# Patient Record
Sex: MALE | Race: WHITE | Employment: OTHER | URBAN - NONMETROPOLITAN AREA
[De-identification: names, ages, dates, MRNs, and addresses within clinical notes are randomized per-mention and may not be internally consistent; named-entity substitution may affect disease eponyms.]

---

## 2017-01-03 ENCOUNTER — OFFICE VISIT (OUTPATIENT)
Dept: CARDIOLOGY | Age: 72
End: 2017-01-03

## 2017-01-03 ENCOUNTER — TELEPHONE (OUTPATIENT)
Dept: INTERNAL MEDICINE | Age: 72
End: 2017-01-03

## 2017-01-03 VITALS
BODY MASS INDEX: 23.63 KG/M2 | DIASTOLIC BLOOD PRESSURE: 70 MMHG | RESPIRATION RATE: 18 BRPM | HEIGHT: 66 IN | HEART RATE: 57 BPM | SYSTOLIC BLOOD PRESSURE: 130 MMHG | WEIGHT: 147 LBS

## 2017-01-03 DIAGNOSIS — I10 ESSENTIAL HYPERTENSION: Primary | ICD-10-CM

## 2017-01-03 DIAGNOSIS — I21.4 NSTEMI (NON-ST ELEVATED MYOCARDIAL INFARCTION) (HCC): ICD-10-CM

## 2017-01-03 DIAGNOSIS — I25.10 CORONARY ARTERY DISEASE DUE TO LIPID RICH PLAQUE: ICD-10-CM

## 2017-01-03 DIAGNOSIS — E78.5 HYPERLIPIDEMIA, UNSPECIFIED HYPERLIPIDEMIA TYPE: ICD-10-CM

## 2017-01-03 DIAGNOSIS — E11.9 TYPE 2 DIABETES MELLITUS WITHOUT COMPLICATION, WITHOUT LONG-TERM CURRENT USE OF INSULIN (HCC): ICD-10-CM

## 2017-01-03 DIAGNOSIS — I25.83 CORONARY ARTERY DISEASE DUE TO LIPID RICH PLAQUE: ICD-10-CM

## 2017-01-03 PROCEDURE — 93000 ELECTROCARDIOGRAM COMPLETE: CPT | Performed by: INTERNAL MEDICINE

## 2017-01-03 PROCEDURE — 99213 OFFICE O/P EST LOW 20 MIN: CPT | Performed by: INTERNAL MEDICINE

## 2017-01-16 ENCOUNTER — TELEPHONE (OUTPATIENT)
Dept: SURGERY | Age: 72
End: 2017-01-16

## 2017-01-16 DIAGNOSIS — Z86.010 HISTORY OF COLONIC POLYPS: ICD-10-CM

## 2017-01-16 DIAGNOSIS — Z12.11 COLON CANCER SCREENING: Primary | ICD-10-CM

## 2017-01-16 RX ORDER — POLYETHYLENE GLYCOL 3350, SODIUM CHLORIDE, SODIUM BICARBONATE, POTASSIUM CHLORIDE 420; 11.2; 5.72; 1.48 G/4L; G/4L; G/4L; G/4L
4000 POWDER, FOR SOLUTION ORAL ONCE
Qty: 4000 ML | Refills: 0 | Status: SHIPPED | OUTPATIENT
Start: 2017-01-16 | End: 2017-01-16

## 2017-02-21 DIAGNOSIS — I10 ESSENTIAL HYPERTENSION: ICD-10-CM

## 2017-02-21 RX ORDER — LISINOPRIL 20 MG/1
20 TABLET ORAL DAILY
Qty: 90 TABLET | Refills: 3 | Status: SHIPPED | OUTPATIENT
Start: 2017-02-21 | End: 2018-02-13 | Stop reason: HOSPADM

## 2017-02-21 RX ORDER — LISINOPRIL 10 MG/1
10 TABLET ORAL DAILY
Qty: 30 TABLET | Refills: 3 | OUTPATIENT
Start: 2017-02-21

## 2017-02-21 RX ORDER — LISINOPRIL 20 MG/1
20 TABLET ORAL DAILY
COMMUNITY
End: 2017-02-21 | Stop reason: SDUPTHER

## 2017-02-27 ENCOUNTER — HOSPITAL ENCOUNTER (OUTPATIENT)
Age: 72
Setting detail: OUTPATIENT SURGERY
Discharge: HOME | End: 2017-02-27
Attending: SURGERY | Admitting: SURGERY
Payer: MEDICARE

## 2017-02-27 ENCOUNTER — SURGERY (OUTPATIENT)
Age: 72
End: 2017-02-27

## 2017-02-27 ENCOUNTER — ANESTHESIA EVENT (OUTPATIENT)
Dept: OPERATING ROOM | Age: 72
End: 2017-02-27
Payer: MEDICARE

## 2017-02-27 ENCOUNTER — ANESTHESIA (OUTPATIENT)
Dept: OPERATING ROOM | Age: 72
End: 2017-02-27
Payer: MEDICARE

## 2017-02-27 VITALS — OXYGEN SATURATION: 98 % | DIASTOLIC BLOOD PRESSURE: 66 MMHG | SYSTOLIC BLOOD PRESSURE: 130 MMHG

## 2017-02-27 VITALS
DIASTOLIC BLOOD PRESSURE: 62 MMHG | HEART RATE: 55 BPM | BODY MASS INDEX: 23.3 KG/M2 | SYSTOLIC BLOOD PRESSURE: 103 MMHG | RESPIRATION RATE: 16 BRPM | WEIGHT: 145 LBS | HEIGHT: 66 IN | OXYGEN SATURATION: 98 % | TEMPERATURE: 97.8 F

## 2017-02-27 PROCEDURE — 7100000011 HC PHASE II RECOVERY - ADDTL 15 MIN: Performed by: SURGERY

## 2017-02-27 PROCEDURE — 6360000002 HC RX W HCPCS: Performed by: NURSE ANESTHETIST, CERTIFIED REGISTERED

## 2017-02-27 PROCEDURE — 45378 DIAGNOSTIC COLONOSCOPY: CPT | Performed by: SURGERY

## 2017-02-27 PROCEDURE — 2580000003 HC RX 258: Performed by: SURGERY

## 2017-02-27 PROCEDURE — 3609027000 HC COLONOSCOPY: Performed by: SURGERY

## 2017-02-27 PROCEDURE — 7100000010 HC PHASE II RECOVERY - FIRST 15 MIN: Performed by: SURGERY

## 2017-02-27 RX ORDER — SODIUM CHLORIDE, SODIUM LACTATE, POTASSIUM CHLORIDE, CALCIUM CHLORIDE 600; 310; 30; 20 MG/100ML; MG/100ML; MG/100ML; MG/100ML
INJECTION, SOLUTION INTRAVENOUS CONTINUOUS
Status: DISCONTINUED | OUTPATIENT
Start: 2017-02-27 | End: 2017-02-27 | Stop reason: HOSPADM

## 2017-02-27 RX ORDER — PROPOFOL 10 MG/ML
INJECTION, EMULSION INTRAVENOUS PRN
Status: DISCONTINUED | OUTPATIENT
Start: 2017-02-27 | End: 2017-02-27 | Stop reason: SDUPTHER

## 2017-02-27 RX ADMIN — SODIUM CHLORIDE, POTASSIUM CHLORIDE, SODIUM LACTATE AND CALCIUM CHLORIDE: 600; 310; 30; 20 INJECTION, SOLUTION INTRAVENOUS at 10:07

## 2017-02-27 RX ADMIN — PROPOFOL 200 MG: 10 INJECTION, EMULSION INTRAVENOUS at 10:55

## 2017-02-27 ASSESSMENT — PAIN DESCRIPTION - DESCRIPTORS: DESCRIPTORS: CONSTANT

## 2017-02-27 ASSESSMENT — PAIN SCALES - GENERAL
PAINLEVEL_OUTOF10: 0

## 2017-02-27 ASSESSMENT — PAIN - FUNCTIONAL ASSESSMENT: PAIN_FUNCTIONAL_ASSESSMENT: 0-10

## 2017-03-13 ENCOUNTER — OFFICE VISIT (OUTPATIENT)
Dept: PRIMARY CARE CLINIC | Age: 72
End: 2017-03-13
Payer: MEDICARE

## 2017-03-13 VITALS
SYSTOLIC BLOOD PRESSURE: 122 MMHG | OXYGEN SATURATION: 98 % | DIASTOLIC BLOOD PRESSURE: 74 MMHG | WEIGHT: 150 LBS | BODY MASS INDEX: 24.11 KG/M2 | TEMPERATURE: 97.8 F | HEART RATE: 59 BPM | HEIGHT: 66 IN

## 2017-03-13 DIAGNOSIS — J01.40 ACUTE PANSINUSITIS, RECURRENCE NOT SPECIFIED: Primary | ICD-10-CM

## 2017-03-13 PROCEDURE — 1123F ACP DISCUSS/DSCN MKR DOCD: CPT | Performed by: PHYSICIAN ASSISTANT

## 2017-03-13 PROCEDURE — 4040F PNEUMOC VAC/ADMIN/RCVD: CPT | Performed by: PHYSICIAN ASSISTANT

## 2017-03-13 PROCEDURE — G8484 FLU IMMUNIZE NO ADMIN: HCPCS | Performed by: PHYSICIAN ASSISTANT

## 2017-03-13 PROCEDURE — G8598 ASA/ANTIPLAT THER USED: HCPCS | Performed by: PHYSICIAN ASSISTANT

## 2017-03-13 PROCEDURE — 99213 OFFICE O/P EST LOW 20 MIN: CPT | Performed by: PHYSICIAN ASSISTANT

## 2017-03-13 PROCEDURE — G8427 DOCREV CUR MEDS BY ELIG CLIN: HCPCS | Performed by: PHYSICIAN ASSISTANT

## 2017-03-13 PROCEDURE — 1036F TOBACCO NON-USER: CPT | Performed by: PHYSICIAN ASSISTANT

## 2017-03-13 PROCEDURE — G8420 CALC BMI NORM PARAMETERS: HCPCS | Performed by: PHYSICIAN ASSISTANT

## 2017-03-13 PROCEDURE — 3017F COLORECTAL CA SCREEN DOC REV: CPT | Performed by: PHYSICIAN ASSISTANT

## 2017-03-13 RX ORDER — CEFUROXIME AXETIL 250 MG/1
250 TABLET ORAL 2 TIMES DAILY
Qty: 20 TABLET | Refills: 0 | Status: SHIPPED | OUTPATIENT
Start: 2017-03-13 | End: 2017-03-23

## 2017-03-13 RX ORDER — PREDNISONE 20 MG/1
20 TABLET ORAL 2 TIMES DAILY
Qty: 10 TABLET | Refills: 0 | Status: SHIPPED | OUTPATIENT
Start: 2017-03-13 | End: 2017-03-18

## 2017-03-13 ASSESSMENT — ENCOUNTER SYMPTOMS
COUGH: 1
SORE THROAT: 1
WHEEZING: 0
RHINORRHEA: 1

## 2017-03-20 ENCOUNTER — TELEPHONE (OUTPATIENT)
Dept: SURGERY | Age: 72
End: 2017-03-20

## 2017-04-24 DIAGNOSIS — I10 ESSENTIAL HYPERTENSION: ICD-10-CM

## 2017-04-24 RX ORDER — METOPROLOL TARTRATE 50 MG/1
TABLET, FILM COATED ORAL
Qty: 180 TABLET | Refills: 3 | Status: SHIPPED | OUTPATIENT
Start: 2017-04-24 | End: 2018-03-19 | Stop reason: SDUPTHER

## 2017-05-05 ENCOUNTER — OFFICE VISIT (OUTPATIENT)
Dept: INTERNAL MEDICINE | Age: 72
End: 2017-05-05
Payer: MEDICARE

## 2017-05-05 VITALS
HEIGHT: 66 IN | WEIGHT: 146 LBS | HEART RATE: 56 BPM | RESPIRATION RATE: 16 BRPM | DIASTOLIC BLOOD PRESSURE: 64 MMHG | BODY MASS INDEX: 23.46 KG/M2 | SYSTOLIC BLOOD PRESSURE: 110 MMHG

## 2017-05-05 DIAGNOSIS — C61 PROSTATE CANCER (HCC): ICD-10-CM

## 2017-05-05 DIAGNOSIS — M54.41 CHRONIC MIDLINE LOW BACK PAIN WITH BILATERAL SCIATICA: ICD-10-CM

## 2017-05-05 DIAGNOSIS — E11.9 TYPE 2 DIABETES MELLITUS WITHOUT COMPLICATION, WITHOUT LONG-TERM CURRENT USE OF INSULIN (HCC): Primary | ICD-10-CM

## 2017-05-05 DIAGNOSIS — G89.29 CHRONIC MIDLINE LOW BACK PAIN WITH BILATERAL SCIATICA: ICD-10-CM

## 2017-05-05 DIAGNOSIS — M54.42 CHRONIC MIDLINE LOW BACK PAIN WITH BILATERAL SCIATICA: ICD-10-CM

## 2017-05-05 DIAGNOSIS — Z23 NEED FOR PNEUMOCOCCAL VACCINATION: ICD-10-CM

## 2017-05-05 DIAGNOSIS — I10 ESSENTIAL HYPERTENSION: ICD-10-CM

## 2017-05-05 DIAGNOSIS — E78.5 HYPERLIPIDEMIA, UNSPECIFIED HYPERLIPIDEMIA TYPE: ICD-10-CM

## 2017-05-05 PROCEDURE — 3044F HG A1C LEVEL LT 7.0%: CPT | Performed by: INTERNAL MEDICINE

## 2017-05-05 PROCEDURE — 1123F ACP DISCUSS/DSCN MKR DOCD: CPT | Performed by: INTERNAL MEDICINE

## 2017-05-05 PROCEDURE — G0009 ADMIN PNEUMOCOCCAL VACCINE: HCPCS | Performed by: INTERNAL MEDICINE

## 2017-05-05 PROCEDURE — 1036F TOBACCO NON-USER: CPT | Performed by: INTERNAL MEDICINE

## 2017-05-05 PROCEDURE — G8598 ASA/ANTIPLAT THER USED: HCPCS | Performed by: INTERNAL MEDICINE

## 2017-05-05 PROCEDURE — 90670 PCV13 VACCINE IM: CPT | Performed by: INTERNAL MEDICINE

## 2017-05-05 PROCEDURE — G8427 DOCREV CUR MEDS BY ELIG CLIN: HCPCS | Performed by: INTERNAL MEDICINE

## 2017-05-05 PROCEDURE — 3017F COLORECTAL CA SCREEN DOC REV: CPT | Performed by: INTERNAL MEDICINE

## 2017-05-05 PROCEDURE — G8420 CALC BMI NORM PARAMETERS: HCPCS | Performed by: INTERNAL MEDICINE

## 2017-05-05 PROCEDURE — 99214 OFFICE O/P EST MOD 30 MIN: CPT | Performed by: INTERNAL MEDICINE

## 2017-05-05 PROCEDURE — 4040F PNEUMOC VAC/ADMIN/RCVD: CPT | Performed by: INTERNAL MEDICINE

## 2017-05-05 ASSESSMENT — ENCOUNTER SYMPTOMS
BLOOD IN STOOL: 0
EYE PAIN: 0
DIARRHEA: 0
SHORTNESS OF BREATH: 0
CONSTIPATION: 0
NAUSEA: 0
ABDOMINAL PAIN: 0
COUGH: 0
VOMITING: 0
BACK PAIN: 0

## 2017-05-05 ASSESSMENT — PATIENT HEALTH QUESTIONNAIRE - PHQ9
2. FEELING DOWN, DEPRESSED OR HOPELESS: 0
SUM OF ALL RESPONSES TO PHQ QUESTIONS 1-9: 0
1. LITTLE INTEREST OR PLEASURE IN DOING THINGS: 0
SUM OF ALL RESPONSES TO PHQ9 QUESTIONS 1 & 2: 0

## 2017-05-06 ENCOUNTER — OFFICE VISIT (OUTPATIENT)
Dept: PRIMARY CARE CLINIC | Age: 72
End: 2017-05-06
Payer: MEDICARE

## 2017-05-06 VITALS
DIASTOLIC BLOOD PRESSURE: 88 MMHG | HEART RATE: 54 BPM | HEIGHT: 66 IN | SYSTOLIC BLOOD PRESSURE: 138 MMHG | WEIGHT: 149 LBS | TEMPERATURE: 97.7 F | RESPIRATION RATE: 14 BRPM | BODY MASS INDEX: 23.95 KG/M2 | OXYGEN SATURATION: 97 %

## 2017-05-06 DIAGNOSIS — H60.502 ACUTE NONINFECTIVE OTITIS EXTERNA OF LEFT EAR, UNSPECIFIED TYPE: Primary | ICD-10-CM

## 2017-05-06 DIAGNOSIS — H61.22 IMPACTED CERUMEN OF LEFT EAR: ICD-10-CM

## 2017-05-06 PROCEDURE — 99213 OFFICE O/P EST LOW 20 MIN: CPT | Performed by: FAMILY MEDICINE

## 2017-05-06 PROCEDURE — 4040F PNEUMOC VAC/ADMIN/RCVD: CPT | Performed by: FAMILY MEDICINE

## 2017-05-06 PROCEDURE — G8427 DOCREV CUR MEDS BY ELIG CLIN: HCPCS | Performed by: FAMILY MEDICINE

## 2017-05-06 PROCEDURE — G8420 CALC BMI NORM PARAMETERS: HCPCS | Performed by: FAMILY MEDICINE

## 2017-05-06 PROCEDURE — 1036F TOBACCO NON-USER: CPT | Performed by: FAMILY MEDICINE

## 2017-05-06 PROCEDURE — 69210 REMOVE IMPACTED EAR WAX UNI: CPT | Performed by: FAMILY MEDICINE

## 2017-05-06 PROCEDURE — G8598 ASA/ANTIPLAT THER USED: HCPCS | Performed by: FAMILY MEDICINE

## 2017-05-06 PROCEDURE — 4130F TOPICAL PREP RX AOE: CPT | Performed by: FAMILY MEDICINE

## 2017-05-06 PROCEDURE — 3017F COLORECTAL CA SCREEN DOC REV: CPT | Performed by: FAMILY MEDICINE

## 2017-05-06 PROCEDURE — 1123F ACP DISCUSS/DSCN MKR DOCD: CPT | Performed by: FAMILY MEDICINE

## 2017-05-06 RX ORDER — NEOMYCIN SULFATE, POLYMYXIN B SULFATE AND HYDROCORTISONE 10; 3.5; 1 MG/ML; MG/ML; [USP'U]/ML
3 SUSPENSION/ DROPS AURICULAR (OTIC) 3 TIMES DAILY
Qty: 1 BOTTLE | Refills: 0 | Status: SHIPPED | OUTPATIENT
Start: 2017-05-06 | End: 2017-05-13

## 2017-05-06 ASSESSMENT — ENCOUNTER SYMPTOMS
VOMITING: 1
NAUSEA: 1
SORE THROAT: 0
COUGH: 0

## 2017-05-10 ENCOUNTER — EVALUATION (OUTPATIENT)
Dept: PHYSICAL THERAPY | Age: 72
End: 2017-05-10
Payer: MEDICARE

## 2017-05-10 DIAGNOSIS — M54.41 CHRONIC MIDLINE LOW BACK PAIN WITH BILATERAL SCIATICA: Primary | ICD-10-CM

## 2017-05-10 DIAGNOSIS — G89.29 CHRONIC MIDLINE LOW BACK PAIN WITH BILATERAL SCIATICA: Primary | ICD-10-CM

## 2017-05-10 DIAGNOSIS — M54.42 CHRONIC MIDLINE LOW BACK PAIN WITH BILATERAL SCIATICA: Primary | ICD-10-CM

## 2017-05-10 PROCEDURE — 97162 PT EVAL MOD COMPLEX 30 MIN: CPT | Performed by: PHYSICAL THERAPIST

## 2017-05-10 PROCEDURE — G8982 BODY POS GOAL STATUS: HCPCS | Performed by: PHYSICAL THERAPIST

## 2017-05-10 PROCEDURE — 97110 THERAPEUTIC EXERCISES: CPT | Performed by: PHYSICAL THERAPIST

## 2017-05-10 PROCEDURE — G8981 BODY POS CURRENT STATUS: HCPCS | Performed by: PHYSICAL THERAPIST

## 2017-05-10 ASSESSMENT — PAIN DESCRIPTION - PROGRESSION: CLINICAL_PROGRESSION: GRADUALLY WORSENING

## 2017-05-10 ASSESSMENT — PAIN DESCRIPTION - FREQUENCY: FREQUENCY: CONTINUOUS

## 2017-05-10 ASSESSMENT — PAIN DESCRIPTION - PAIN TYPE: TYPE: CHRONIC PAIN

## 2017-05-10 ASSESSMENT — PAIN DESCRIPTION - LOCATION: LOCATION: BACK

## 2017-05-10 ASSESSMENT — PAIN DESCRIPTION - ORIENTATION: ORIENTATION: MID;LOWER;LEFT;RIGHT;POSTERIOR

## 2017-05-10 ASSESSMENT — PAIN SCALES - GENERAL: PAINLEVEL_OUTOF10: 5

## 2017-05-10 ASSESSMENT — PAIN DESCRIPTION - ONSET: ONSET: ON-GOING

## 2017-05-12 ENCOUNTER — TREATMENT (OUTPATIENT)
Dept: PHYSICAL THERAPY | Age: 72
End: 2017-05-12
Payer: MEDICARE

## 2017-05-12 DIAGNOSIS — M54.41 CHRONIC MIDLINE LOW BACK PAIN WITH BILATERAL SCIATICA: Primary | ICD-10-CM

## 2017-05-12 DIAGNOSIS — M54.42 CHRONIC MIDLINE LOW BACK PAIN WITH BILATERAL SCIATICA: Primary | ICD-10-CM

## 2017-05-12 DIAGNOSIS — G89.29 CHRONIC MIDLINE LOW BACK PAIN WITH BILATERAL SCIATICA: Primary | ICD-10-CM

## 2017-05-12 PROCEDURE — G0283 ELEC STIM OTHER THAN WOUND: HCPCS | Performed by: PHYSICAL THERAPIST

## 2017-05-12 PROCEDURE — 97110 THERAPEUTIC EXERCISES: CPT | Performed by: PHYSICAL THERAPIST

## 2017-05-16 ENCOUNTER — TREATMENT (OUTPATIENT)
Dept: PHYSICAL THERAPY | Age: 72
End: 2017-05-16
Payer: MEDICARE

## 2017-05-16 DIAGNOSIS — M54.41 CHRONIC MIDLINE LOW BACK PAIN WITH BILATERAL SCIATICA: Primary | ICD-10-CM

## 2017-05-16 DIAGNOSIS — G89.29 CHRONIC MIDLINE LOW BACK PAIN WITH BILATERAL SCIATICA: Primary | ICD-10-CM

## 2017-05-16 DIAGNOSIS — M54.42 CHRONIC MIDLINE LOW BACK PAIN WITH BILATERAL SCIATICA: Primary | ICD-10-CM

## 2017-05-16 PROCEDURE — G0283 ELEC STIM OTHER THAN WOUND: HCPCS | Performed by: PHYSICAL THERAPIST

## 2017-05-16 PROCEDURE — 97110 THERAPEUTIC EXERCISES: CPT | Performed by: PHYSICAL THERAPIST

## 2017-05-22 ENCOUNTER — TREATMENT (OUTPATIENT)
Dept: PHYSICAL THERAPY | Age: 72
End: 2017-05-22

## 2017-06-01 ENCOUNTER — TREATMENT (OUTPATIENT)
Dept: PHYSICAL THERAPY | Age: 72
End: 2017-06-01

## 2017-06-26 RX ORDER — PANTOPRAZOLE SODIUM 40 MG/1
40 TABLET, DELAYED RELEASE ORAL DAILY
Qty: 30 TABLET | Refills: 11 | Status: SHIPPED | OUTPATIENT
Start: 2017-06-26 | End: 2018-06-19 | Stop reason: SDUPTHER

## 2017-07-25 RX ORDER — NITROGLYCERIN 0.4 MG/1
0.4 TABLET SUBLINGUAL EVERY 5 MIN PRN
Qty: 25 TABLET | Refills: 3 | Status: SHIPPED | OUTPATIENT
Start: 2017-07-25 | End: 2020-05-15 | Stop reason: SDUPTHER

## 2017-07-25 RX ORDER — CLOPIDOGREL BISULFATE 75 MG/1
75 TABLET ORAL DAILY
Qty: 30 TABLET | Refills: 6 | Status: SHIPPED | OUTPATIENT
Start: 2017-07-25 | End: 2018-04-03 | Stop reason: SDUPTHER

## 2017-08-08 ENCOUNTER — OFFICE VISIT (OUTPATIENT)
Dept: PRIMARY CARE CLINIC | Age: 72
End: 2017-08-08
Payer: MEDICARE

## 2017-08-08 VITALS
HEART RATE: 62 BPM | WEIGHT: 149 LBS | TEMPERATURE: 97.7 F | DIASTOLIC BLOOD PRESSURE: 82 MMHG | BODY MASS INDEX: 24.05 KG/M2 | SYSTOLIC BLOOD PRESSURE: 134 MMHG | OXYGEN SATURATION: 97 %

## 2017-08-08 DIAGNOSIS — M79.661 RIGHT CALF PAIN: ICD-10-CM

## 2017-08-08 DIAGNOSIS — M79.661 PAIN IN RIGHT LOWER LEG: ICD-10-CM

## 2017-08-08 DIAGNOSIS — M71.21 BAKER'S CYST OF KNEE, RIGHT: Primary | ICD-10-CM

## 2017-08-08 PROCEDURE — G8420 CALC BMI NORM PARAMETERS: HCPCS | Performed by: FAMILY MEDICINE

## 2017-08-08 PROCEDURE — 1123F ACP DISCUSS/DSCN MKR DOCD: CPT | Performed by: FAMILY MEDICINE

## 2017-08-08 PROCEDURE — 3017F COLORECTAL CA SCREEN DOC REV: CPT | Performed by: FAMILY MEDICINE

## 2017-08-08 PROCEDURE — G8598 ASA/ANTIPLAT THER USED: HCPCS | Performed by: FAMILY MEDICINE

## 2017-08-08 PROCEDURE — 4040F PNEUMOC VAC/ADMIN/RCVD: CPT | Performed by: FAMILY MEDICINE

## 2017-08-08 PROCEDURE — 1036F TOBACCO NON-USER: CPT | Performed by: FAMILY MEDICINE

## 2017-08-08 PROCEDURE — G8427 DOCREV CUR MEDS BY ELIG CLIN: HCPCS | Performed by: FAMILY MEDICINE

## 2017-08-08 PROCEDURE — 99214 OFFICE O/P EST MOD 30 MIN: CPT | Performed by: FAMILY MEDICINE

## 2017-08-08 RX ORDER — PREDNISONE 20 MG/1
20 TABLET ORAL 2 TIMES DAILY
Qty: 10 TABLET | Refills: 0 | Status: SHIPPED | OUTPATIENT
Start: 2017-08-08 | End: 2017-08-13

## 2017-08-08 ASSESSMENT — ENCOUNTER SYMPTOMS: COLOR CHANGE: 1

## 2017-09-12 ENCOUNTER — OFFICE VISIT (OUTPATIENT)
Dept: CARDIOLOGY | Age: 72
End: 2017-09-12
Payer: MEDICARE

## 2017-09-12 VITALS
WEIGHT: 146 LBS | DIASTOLIC BLOOD PRESSURE: 70 MMHG | HEIGHT: 66 IN | BODY MASS INDEX: 23.46 KG/M2 | SYSTOLIC BLOOD PRESSURE: 160 MMHG | HEART RATE: 58 BPM

## 2017-09-12 DIAGNOSIS — I10 ESSENTIAL HYPERTENSION: Primary | ICD-10-CM

## 2017-09-12 DIAGNOSIS — I21.4 NSTEMI (NON-ST ELEVATED MYOCARDIAL INFARCTION) (HCC): ICD-10-CM

## 2017-09-12 DIAGNOSIS — E78.5 HYPERLIPIDEMIA, UNSPECIFIED HYPERLIPIDEMIA TYPE: ICD-10-CM

## 2017-09-12 DIAGNOSIS — I25.10 CORONARY ARTERY DISEASE DUE TO LIPID RICH PLAQUE: ICD-10-CM

## 2017-09-12 DIAGNOSIS — E11.9 TYPE 2 DIABETES MELLITUS WITHOUT COMPLICATION, WITHOUT LONG-TERM CURRENT USE OF INSULIN (HCC): ICD-10-CM

## 2017-09-12 DIAGNOSIS — I25.83 CORONARY ARTERY DISEASE DUE TO LIPID RICH PLAQUE: ICD-10-CM

## 2017-09-12 PROCEDURE — 99213 OFFICE O/P EST LOW 20 MIN: CPT | Performed by: INTERNAL MEDICINE

## 2017-09-12 PROCEDURE — 1036F TOBACCO NON-USER: CPT | Performed by: INTERNAL MEDICINE

## 2017-09-12 PROCEDURE — G8427 DOCREV CUR MEDS BY ELIG CLIN: HCPCS | Performed by: INTERNAL MEDICINE

## 2017-09-12 PROCEDURE — G8598 ASA/ANTIPLAT THER USED: HCPCS | Performed by: INTERNAL MEDICINE

## 2017-09-12 PROCEDURE — G8420 CALC BMI NORM PARAMETERS: HCPCS | Performed by: INTERNAL MEDICINE

## 2017-09-12 PROCEDURE — 1123F ACP DISCUSS/DSCN MKR DOCD: CPT | Performed by: INTERNAL MEDICINE

## 2017-09-12 PROCEDURE — 4040F PNEUMOC VAC/ADMIN/RCVD: CPT | Performed by: INTERNAL MEDICINE

## 2017-09-12 PROCEDURE — 3046F HEMOGLOBIN A1C LEVEL >9.0%: CPT | Performed by: INTERNAL MEDICINE

## 2017-09-12 PROCEDURE — 3017F COLORECTAL CA SCREEN DOC REV: CPT | Performed by: INTERNAL MEDICINE

## 2017-09-12 PROCEDURE — 93000 ELECTROCARDIOGRAM COMPLETE: CPT | Performed by: INTERNAL MEDICINE

## 2017-09-22 ENCOUNTER — TELEPHONE (OUTPATIENT)
Dept: CARDIOLOGY | Age: 72
End: 2017-09-22

## 2017-11-01 ENCOUNTER — HOSPITAL ENCOUNTER (OUTPATIENT)
Dept: LAB | Age: 72
Setting detail: SPECIMEN
Discharge: HOME | End: 2017-11-01
Payer: MEDICARE

## 2017-11-01 DIAGNOSIS — E11.9 TYPE 2 DIABETES MELLITUS WITHOUT COMPLICATION, WITHOUT LONG-TERM CURRENT USE OF INSULIN (HCC): ICD-10-CM

## 2017-11-01 DIAGNOSIS — E78.5 HYPERLIPIDEMIA, UNSPECIFIED HYPERLIPIDEMIA TYPE: ICD-10-CM

## 2017-11-01 DIAGNOSIS — I10 ESSENTIAL HYPERTENSION: ICD-10-CM

## 2017-11-01 DIAGNOSIS — C61 PROSTATE CANCER (HCC): ICD-10-CM

## 2017-11-01 LAB
ALBUMIN SERPL-MCNC: 4.3 G/DL (ref 3.5–5.2)
ALBUMIN/GLOBULIN RATIO: 2.4 (ref 1–2.5)
ALP BLD-CCNC: 107 U/L (ref 40–129)
ALT SERPL-CCNC: 16 U/L (ref 5–41)
ANION GAP SERPL CALCULATED.3IONS-SCNC: 12 MMOL/L (ref 9–17)
AST SERPL-CCNC: 19 U/L
BILIRUB SERPL-MCNC: 0.64 MG/DL (ref 0.3–1.2)
BUN BLDV-MCNC: 21 MG/DL (ref 8–23)
BUN/CREAT BLD: 29 (ref 9–20)
CALCIUM SERPL-MCNC: 9 MG/DL (ref 8.6–10.4)
CHLORIDE BLD-SCNC: 104 MMOL/L (ref 98–107)
CHOLESTEROL/HDL RATIO: 2.6
CHOLESTEROL: 125 MG/DL
CO2: 29 MMOL/L (ref 20–31)
CREAT SERPL-MCNC: 0.72 MG/DL (ref 0.7–1.2)
CREATININE URINE: 313.7 MG/DL (ref 39–259)
ESTIMATED AVERAGE GLUCOSE: 114 MG/DL
GFR AFRICAN AMERICAN: >60 ML/MIN
GFR NON-AFRICAN AMERICAN: >60 ML/MIN
GFR SERPL CREATININE-BSD FRML MDRD: ABNORMAL ML/MIN/{1.73_M2}
GFR SERPL CREATININE-BSD FRML MDRD: ABNORMAL ML/MIN/{1.73_M2}
GLUCOSE BLD-MCNC: 103 MG/DL (ref 70–99)
HBA1C MFR BLD: 5.6 % (ref 4.8–5.9)
HDLC SERPL-MCNC: 49 MG/DL
LDL CHOLESTEROL: 59 MG/DL (ref 0–130)
MICROALBUMIN/CREAT 24H UR: 20 MG/L
MICROALBUMIN/CREAT UR-RTO: 6 MCG/MG CREAT
POTASSIUM SERPL-SCNC: 4.1 MMOL/L (ref 3.7–5.3)
PROSTATE SPECIFIC ANTIGEN: <0.01 UG/L
SODIUM BLD-SCNC: 145 MMOL/L (ref 135–144)
TOTAL PROTEIN: 6.1 G/DL (ref 6.4–8.3)
TRIGL SERPL-MCNC: 85 MG/DL
VLDLC SERPL CALC-MCNC: NORMAL MG/DL (ref 1–30)

## 2017-11-01 PROCEDURE — 82043 UR ALBUMIN QUANTITATIVE: CPT

## 2017-11-01 PROCEDURE — 83036 HEMOGLOBIN GLYCOSYLATED A1C: CPT

## 2017-11-01 PROCEDURE — 80061 LIPID PANEL: CPT

## 2017-11-01 PROCEDURE — 84153 ASSAY OF PSA TOTAL: CPT

## 2017-11-01 PROCEDURE — 80053 COMPREHEN METABOLIC PANEL: CPT

## 2017-11-01 PROCEDURE — 82570 ASSAY OF URINE CREATININE: CPT

## 2017-11-01 PROCEDURE — 36415 COLL VENOUS BLD VENIPUNCTURE: CPT

## 2017-11-08 ENCOUNTER — OFFICE VISIT (OUTPATIENT)
Dept: INTERNAL MEDICINE | Age: 72
End: 2017-11-08
Payer: MEDICARE

## 2017-11-08 VITALS
WEIGHT: 146 LBS | BODY MASS INDEX: 23.46 KG/M2 | DIASTOLIC BLOOD PRESSURE: 82 MMHG | SYSTOLIC BLOOD PRESSURE: 132 MMHG | HEIGHT: 66 IN | HEART RATE: 60 BPM | RESPIRATION RATE: 20 BRPM

## 2017-11-08 DIAGNOSIS — E78.5 HYPERLIPIDEMIA, UNSPECIFIED HYPERLIPIDEMIA TYPE: ICD-10-CM

## 2017-11-08 DIAGNOSIS — I10 ESSENTIAL HYPERTENSION: ICD-10-CM

## 2017-11-08 DIAGNOSIS — E11.9 TYPE 2 DIABETES MELLITUS WITHOUT COMPLICATION, WITHOUT LONG-TERM CURRENT USE OF INSULIN (HCC): Primary | ICD-10-CM

## 2017-11-08 PROCEDURE — G8484 FLU IMMUNIZE NO ADMIN: HCPCS | Performed by: INTERNAL MEDICINE

## 2017-11-08 PROCEDURE — 90662 IIV NO PRSV INCREASED AG IM: CPT | Performed by: INTERNAL MEDICINE

## 2017-11-08 PROCEDURE — G8598 ASA/ANTIPLAT THER USED: HCPCS | Performed by: INTERNAL MEDICINE

## 2017-11-08 PROCEDURE — 3017F COLORECTAL CA SCREEN DOC REV: CPT | Performed by: INTERNAL MEDICINE

## 2017-11-08 PROCEDURE — 99214 OFFICE O/P EST MOD 30 MIN: CPT | Performed by: INTERNAL MEDICINE

## 2017-11-08 PROCEDURE — G8427 DOCREV CUR MEDS BY ELIG CLIN: HCPCS | Performed by: INTERNAL MEDICINE

## 2017-11-08 PROCEDURE — G8420 CALC BMI NORM PARAMETERS: HCPCS | Performed by: INTERNAL MEDICINE

## 2017-11-08 PROCEDURE — 1036F TOBACCO NON-USER: CPT | Performed by: INTERNAL MEDICINE

## 2017-11-08 PROCEDURE — 1123F ACP DISCUSS/DSCN MKR DOCD: CPT | Performed by: INTERNAL MEDICINE

## 2017-11-08 PROCEDURE — 3044F HG A1C LEVEL LT 7.0%: CPT | Performed by: INTERNAL MEDICINE

## 2017-11-08 PROCEDURE — G0008 ADMIN INFLUENZA VIRUS VAC: HCPCS | Performed by: INTERNAL MEDICINE

## 2017-11-08 PROCEDURE — 4040F PNEUMOC VAC/ADMIN/RCVD: CPT | Performed by: INTERNAL MEDICINE

## 2017-11-08 ASSESSMENT — ENCOUNTER SYMPTOMS
ABDOMINAL PAIN: 0
NAUSEA: 0
BACK PAIN: 0
BLOOD IN STOOL: 0
COUGH: 0
SHORTNESS OF BREATH: 0
VOMITING: 0
DIARRHEA: 0
CONSTIPATION: 0
EYE PAIN: 0

## 2017-11-08 NOTE — PROGRESS NOTES
of 2 - PPSV23) 05/05/2018    Diabetic hemoglobin A1C test  11/01/2018    Diabetic microalbuminuria test  11/01/2018    Lipid screen  11/01/2018    Colon cancer screen colonoscopy  02/27/2022    AAA screen  Completed    Hepatitis C screen  Completed

## 2017-11-08 NOTE — PATIENT INSTRUCTIONS
you to:  · Feel stronger and have more energy to do all the things you like to do. · Focus better at school or work and perform better in sports. · Feel, think, and sleep better. · Reach and stay at a healthy weight. · Lose fat and build lean muscle. · Lower your risk for serious health problems. · Keep your bones, muscles, and joints strong. Being fit lets you do more physical activity. And it lets you work out harder without as much effort. How can you make physical activity part of your life? Get at least 30 minutes of exercise on most days of the week. Walking is a good choice. You also may want to do other activities, such as running, swimming, cycling, or playing tennis or team sports. Pick activities that you likeones that make your heart beat faster, your muscles stronger, and your muscles and joints more flexible. If you find more than one thing you like doing, do them all. You don't have to do the same thing every day. Get your heart pumping every day. Any activity that makes your heart beat faster and keeps it at that rate for a while counts. Here are some great ways to get your heart beating faster:  · Go for a brisk walk, run, or bike ride. · Go for a hike or swim. · Go in-line skating. · Play a game of touch football, basketball, or soccer. · Ride a bike. · Play tennis or racquetball. · Climb stairs. Even some household chores can be aerobicjust do them at a faster pace. Vacuuming, raking or mowing the lawn, sweeping the garage, and washing and waxing the car all can help get your heart rate up. Strengthen your muscles during the week. You don't have to lift heavy weights or grow big, bulky muscles to get stronger. Doing a few simple activities that make your muscles work against, or \"resist,\" something can help you get stronger. For example, you can:  · Do push-ups or sit-ups, which use your own body weight as resistance.   · Lift weights or dumbbells or use stretch bands at home

## 2017-11-08 NOTE — PROGRESS NOTES
[address] [facility] INTERNAL MED  [address] [id number]  Dept: [phone]  Dept Fax: [phone]  Loc: [phone]    Bernardino Hagan is a 67 y.o. male who presents today for his medical conditions/complaints as noted below. Bernardino Hagan is c/o of   Chief Complaint   Patient presents with    Diabetes     6 month, labs    Hypertension     6 month, labs    Hyperlipidemia     6 month, labs         HPI:     Diabetes   He presents for his follow-up diabetic visit. He has type 2 (Without consultation without long-term insulin use) diabetes mellitus. His disease course has been fluctuating. Pertinent negatives for hypoglycemia include no confusion, dizziness, headaches, nervousness/anxiousness or pallor. Pertinent negatives for diabetes include no chest pain, no polydipsia, no polyuria and no weakness. Hypertension   This is a chronic problem. The current episode started more than 1 year ago. The problem has been waxing and waning since onset. The problem is controlled. Pertinent negatives include no chest pain, headaches, neck pain, palpitations or shortness of breath. Hyperlipidemia   This is a chronic problem. The current episode started more than 1 year ago. The problem is controlled. Recent lipid tests were reviewed and are variable. Pertinent negatives include no chest pain or shortness of breath. Hemoglobin A1C (%)   Date Value   11/01/2017 5.6   04/25/2017 5.8   10/27/2015 5.8                Microalb/Crt.  Ratio (mcg/mg creat)   Date Value   11/01/2017 6     LDL Cholesterol (mg/dL)   Date Value   11/01/2017 59   11/03/2016 65   10/27/2015 58         AST (U/L)   Date Value   11/01/2017 19     ALT (U/L)   Date Value   11/01/2017 16     BUN (mg/dL)   Date Value   11/01/2017 21     BP Readings from Last 3 Encounters:   11/08/17 132/82   09/12/17 (!) 160/70   08/08/17 134/82              Past Medical History:   Diagnosis Date    Chronic bronchitis (HCC)     DJD (degenerative joint disease)     GERD (gastroesophageal reflux disease)     Hearing loss     Mild    Hyperlipidemia     Hypertension     Osteoarthritis of left knee     Overactive bladder     Prostate cancer (HCC)     Status post radical prostatectomy in 2009. Continues to follow with Dr. Mina Allison every 6 months.  Seasonal allergies     Spondylosis     Type II or unspecified type diabetes mellitus without mention of complication, not stated as uncontrolled     Diet controlled      Past Surgical History:   Procedure Laterality Date    CARDIAC SURGERY      COLONOSCOPY  03/15/2002    Dr. Micheline Padilla      july 2015 drug-eluting stents placed 2 to the RCA and one to the left circumflex    OTHER SURGICAL HISTORY  08/25/2009    bilateral L3-4, L4-5, L5-S1 steroid facet injection     AK COLON CA SCRN NOT HI RSK IND N/A 2/27/2017    COLONOSCOPY performed by Pedro Barriga MD at [address] [place] [address] OR  diverticulosis    PROSTATECTOMY  02/09/2010    Dr. Gabbi Baird Left 04/23/2013    UPPER GASTROINTESTINAL ENDOSCOPY  03/15/2002    Dr. Romayne Liter       Family History   Problem Relation Age of Onset    Stroke Father        Social History   Substance Use Topics    Smoking status: Former Smoker     Packs/day: 0.50     Years: 20.00     Quit date: 5/6/1987    Smokeless tobacco: Never Used      Comment: Quit 30 years ago.  [facility]    Alcohol use No      Current Outpatient Prescriptions   Medication Sig Dispense Refill    clopidogrel (PLAVIX) 75 MG tablet Take 1 tablet by mouth daily 30 tablet 6    nitroGLYCERIN (NITROSTAT) 0.4 MG SL tablet Place 1 tablet under the tongue every 5 minutes as needed for Chest pain 25 tablet 3    pantoprazole (PROTONIX) 40 MG tablet Take 1 tablet by mouth daily 30 tablet 11    metoprolol tartrate (LOPRESSOR) 50 MG tablet take 1 tablet by mouth twice a day 180 tablet 3    lisinopril (PRINIVIL;ZESTRIL) 20 MG nausea and vomiting. Endocrine: Negative for cold intolerance, polydipsia and polyuria. Genitourinary: Negative for difficulty urinating, dysuria and hematuria. Musculoskeletal: Negative for arthralgias, back pain, gait problem and neck pain. Skin: Negative for pallor and rash. Neurological: Negative for dizziness, weakness, numbness and headaches. Hematological: Negative for adenopathy. Does not bruise/bleed easily. Psychiatric/Behavioral: Negative for confusion. The patient is not nervous/anxious. Objective:     Physical Exam   Constitutional: He is oriented to person, place, and time. He appears well-developed and well-nourished. HENT:   Head: Normocephalic and atraumatic. Eyes: EOM are normal. Pupils are equal, round, and reactive to light. Neck: Neck supple. Cardiovascular: Normal rate and regular rhythm. Exam reveals no gallop and no friction rub. No murmur heard. Pulmonary/Chest: Effort normal and breath sounds normal. He has no wheezes. He has no rales. Abdominal: Soft. He exhibits no distension and no mass. There is no tenderness. There is no rebound. Musculoskeletal: Normal range of motion. He exhibits no edema. Lymphadenopathy:     He has no cervical adenopathy. Neurological: He is alert and oriented to person, place, and time. No cranial nerve deficit (grossly). Diabetic foot exam is normal with normal.  Monofilament sensory testing and normal vibration sense and normal pulses bilaterally   Skin: Skin is warm and dry. No rash noted. Psychiatric: He has a normal mood and affect. Thought content normal.   Vitals reviewed. /82 (Site: Right Arm, Position: Sitting, Cuff Size: Medium Adult)   Pulse 60   Resp 20   Ht 5' 6\" (1.676 m)   Wt 146 lb (66.2 kg)   BMI 23.57 kg/m²     Assessment:      1. Type 2 diabetes mellitus without complication, without long-term current use of insulin ([facility])  Hemoglobin A1C     DIABETES FOOT EXAM   2.  Essential hypertension  Hemoglobin A1C   3. Hyperlipidemia, unspecified hyperlipidemia type  Hemoglobin A1C             Plan:      Return in about 6 months (around 5/8/2018) for Diabetes, Hyperlipidemia, Hypertension. Orders Placed This Encounter   Procedures    INFLUENZA, HIGH DOSE, 65 YRS +, IM, PF, PREFILL SYR, 0.5ML (FLUZONE HD)    Hemoglobin A1C     Standing Status:   Future     Standing Expiration Date:   11/8/2018     DIABETES FOOT EXAM     No orders of the defined types were placed in this encounter. Patient given educational materials - see patient instructions. Discussed use, benefit, and side effects of prescribed medications. All patient questions answered. Pt voiced understanding. Reviewed health maintenance. Instructed to continue current medications, diet and exercise. Patient agreed with treatment plan. Follow up as directed.      Electronically signed by Marlon Plascencia MD on 11/8/2017 at 1:11 PM

## 2017-11-08 NOTE — PROGRESS NOTES
Have you had an allergic reaction to the flu (influenza) shot? no  Are you allergic to eggs or any component of the flu vaccine? no  Do you have a history of Guillain-[place] Syndrome (GBS), which is paralysis after receiving the flu vaccine? no  Are you feeling well today? yes  Flu vaccine given as ordered. Patient tolerated it well. No questions re: VIS information.

## 2017-12-26 RX ORDER — ATORVASTATIN CALCIUM 80 MG/1
TABLET, FILM COATED ORAL
Qty: 30 TABLET | Refills: 6 | Status: SHIPPED | OUTPATIENT
Start: 2017-12-26 | End: 2018-12-18 | Stop reason: SDUPTHER

## 2017-12-26 RX ORDER — ATORVASTATIN CALCIUM 80 MG/1
TABLET, FILM COATED ORAL
Qty: 30 TABLET | Refills: 6 | OUTPATIENT
Start: 2017-12-26

## 2018-01-08 ENCOUNTER — HOSPITAL ENCOUNTER (OUTPATIENT)
Dept: LAB | Age: 73
Setting detail: SPECIMEN
Discharge: HOME | End: 2018-01-08
Payer: MEDICARE

## 2018-01-08 ENCOUNTER — HOSPITAL ENCOUNTER (OUTPATIENT)
Dept: NON INVASIVE DIAGNOSTICS | Age: 73
Discharge: HOME | End: 2018-01-08
Payer: MEDICARE

## 2018-01-08 LAB
-: ABNORMAL
AMORPHOUS: ABNORMAL
ANION GAP SERPL CALCULATED.3IONS-SCNC: 12 MMOL/L (ref 9–17)
BACTERIA: ABNORMAL
BILIRUBIN URINE: NEGATIVE
BUN BLDV-MCNC: 22 MG/DL (ref 8–23)
CASTS UA: ABNORMAL /LPF (ref 0–2)
CHLORIDE BLD-SCNC: 102 MMOL/L (ref 98–107)
CO2: 28 MMOL/L (ref 20–31)
COLOR: ABNORMAL
COMMENT UA: ABNORMAL
CREAT SERPL-MCNC: 0.77 MG/DL (ref 0.7–1.2)
CRYSTALS, UA: ABNORMAL /HPF
EKG ATRIAL RATE: 64 BPM
EKG P AXIS: 25 DEGREES
EKG P-R INTERVAL: 160 MS
EKG Q-T INTERVAL: 420 MS
EKG QRS DURATION: 90 MS
EKG QTC CALCULATION (BAZETT): 433 MS
EKG R AXIS: 12 DEGREES
EKG T AXIS: 29 DEGREES
EKG VENTRICULAR RATE: 64 BPM
EPITHELIAL CELLS UA: ABNORMAL /HPF (ref 0–5)
ESTIMATED AVERAGE GLUCOSE: 108 MG/DL
GFR AFRICAN AMERICAN: >60 ML/MIN
GFR NON-AFRICAN AMERICAN: >60 ML/MIN
GFR SERPL CREATININE-BSD FRML MDRD: NORMAL ML/MIN/{1.73_M2}
GFR SERPL CREATININE-BSD FRML MDRD: NORMAL ML/MIN/{1.73_M2}
GLUCOSE BLD-MCNC: 108 MG/DL (ref 70–99)
GLUCOSE URINE: NEGATIVE
HBA1C MFR BLD: 5.4 % (ref 4.8–5.9)
HCT VFR BLD CALC: 42.7 % (ref 41–53)
HEMOGLOBIN: 14.5 G/DL (ref 13.5–17.5)
INR BLD: 1
KETONES, URINE: NEGATIVE
LEUKOCYTE ESTERASE, URINE: NEGATIVE
MCH RBC QN AUTO: 30.6 PG (ref 26–34)
MCHC RBC AUTO-ENTMCNC: 34.1 G/DL (ref 31–37)
MCV RBC AUTO: 89.9 FL (ref 80–100)
MUCUS: ABNORMAL
NITRITE, URINE: NEGATIVE
OTHER OBSERVATIONS UA: ABNORMAL
PARTIAL THROMBOPLASTIN TIME: 34.5 SEC (ref 27–35)
PDW BLD-RTO: 12.7 % (ref 11–14.5)
PH UA: 5.5 (ref 5–6)
PLATELET # BLD: 257 K/UL (ref 140–450)
PMV BLD AUTO: 7.5 FL (ref 6–12)
POTASSIUM SERPL-SCNC: 4.3 MMOL/L (ref 3.7–5.3)
PROTEIN UA: NEGATIVE
PROTHROMBIN TIME: 10.5 SEC (ref 9.4–11.3)
RBC # BLD: 4.74 M/UL (ref 4.5–5.9)
RBC UA: ABNORMAL /HPF (ref 0–4)
RENAL EPITHELIAL, UA: ABNORMAL /HPF
SODIUM BLD-SCNC: 142 MMOL/L (ref 135–144)
SPECIFIC GRAVITY UA: 1.03 (ref 1.01–1.02)
TRICHOMONAS: ABNORMAL
TURBIDITY: ABNORMAL
URINE HGB: NEGATIVE
UROBILINOGEN, URINE: NORMAL
WBC # BLD: 5.9 K/UL (ref 3.5–11)
WBC UA: ABNORMAL /HPF (ref 0–4)
YEAST: ABNORMAL

## 2018-01-08 PROCEDURE — 83036 HEMOGLOBIN GLYCOSYLATED A1C: CPT

## 2018-01-08 PROCEDURE — 82947 ASSAY GLUCOSE BLOOD QUANT: CPT

## 2018-01-08 PROCEDURE — 81001 URINALYSIS AUTO W/SCOPE: CPT

## 2018-01-08 PROCEDURE — 80051 ELECTROLYTE PANEL: CPT

## 2018-01-08 PROCEDURE — 85027 COMPLETE CBC AUTOMATED: CPT

## 2018-01-08 PROCEDURE — 36415 COLL VENOUS BLD VENIPUNCTURE: CPT

## 2018-01-08 PROCEDURE — 85610 PROTHROMBIN TIME: CPT

## 2018-01-08 PROCEDURE — 87641 MR-STAPH DNA AMP PROBE: CPT

## 2018-01-08 PROCEDURE — 82565 ASSAY OF CREATININE: CPT

## 2018-01-08 PROCEDURE — 84520 ASSAY OF UREA NITROGEN: CPT

## 2018-01-08 PROCEDURE — 93005 ELECTROCARDIOGRAM TRACING: CPT

## 2018-01-08 PROCEDURE — 85730 THROMBOPLASTIN TIME PARTIAL: CPT

## 2018-01-09 LAB
MRSA, DNA, NASAL: NORMAL
SPECIMEN DESCRIPTION: NORMAL

## 2018-01-19 ENCOUNTER — OFFICE VISIT (OUTPATIENT)
Dept: INTERNAL MEDICINE | Age: 73
End: 2018-01-19
Payer: MEDICARE

## 2018-01-19 VITALS
BODY MASS INDEX: 24.01 KG/M2 | TEMPERATURE: 99.1 F | WEIGHT: 153 LBS | HEART RATE: 64 BPM | RESPIRATION RATE: 16 BRPM | OXYGEN SATURATION: 96 % | HEIGHT: 67 IN | DIASTOLIC BLOOD PRESSURE: 72 MMHG | SYSTOLIC BLOOD PRESSURE: 140 MMHG

## 2018-01-19 DIAGNOSIS — R11.0 NAUSEA: ICD-10-CM

## 2018-01-19 DIAGNOSIS — I25.83 CORONARY ARTERY DISEASE DUE TO LIPID RICH PLAQUE: ICD-10-CM

## 2018-01-19 DIAGNOSIS — J42 CHRONIC BRONCHITIS, UNSPECIFIED CHRONIC BRONCHITIS TYPE (HCC): ICD-10-CM

## 2018-01-19 DIAGNOSIS — I10 ESSENTIAL HYPERTENSION: ICD-10-CM

## 2018-01-19 DIAGNOSIS — I25.10 CORONARY ARTERY DISEASE DUE TO LIPID RICH PLAQUE: ICD-10-CM

## 2018-01-19 DIAGNOSIS — Z01.818 PREOP TESTING: ICD-10-CM

## 2018-01-19 DIAGNOSIS — E11.9 TYPE 2 DIABETES MELLITUS WITHOUT COMPLICATION, WITHOUT LONG-TERM CURRENT USE OF INSULIN (HCC): Primary | ICD-10-CM

## 2018-01-19 PROCEDURE — 99214 OFFICE O/P EST MOD 30 MIN: CPT | Performed by: INTERNAL MEDICINE

## 2018-01-19 PROCEDURE — 3023F SPIROM DOC REV: CPT | Performed by: INTERNAL MEDICINE

## 2018-01-19 PROCEDURE — 1036F TOBACCO NON-USER: CPT | Performed by: INTERNAL MEDICINE

## 2018-01-19 PROCEDURE — G8926 SPIRO NO PERF OR DOC: HCPCS | Performed by: INTERNAL MEDICINE

## 2018-01-19 PROCEDURE — 4040F PNEUMOC VAC/ADMIN/RCVD: CPT | Performed by: INTERNAL MEDICINE

## 2018-01-19 PROCEDURE — G8420 CALC BMI NORM PARAMETERS: HCPCS | Performed by: INTERNAL MEDICINE

## 2018-01-19 PROCEDURE — G8484 FLU IMMUNIZE NO ADMIN: HCPCS | Performed by: INTERNAL MEDICINE

## 2018-01-19 PROCEDURE — 3044F HG A1C LEVEL LT 7.0%: CPT | Performed by: INTERNAL MEDICINE

## 2018-01-19 PROCEDURE — 1123F ACP DISCUSS/DSCN MKR DOCD: CPT | Performed by: INTERNAL MEDICINE

## 2018-01-19 PROCEDURE — 3017F COLORECTAL CA SCREEN DOC REV: CPT | Performed by: INTERNAL MEDICINE

## 2018-01-19 PROCEDURE — G8598 ASA/ANTIPLAT THER USED: HCPCS | Performed by: INTERNAL MEDICINE

## 2018-01-19 PROCEDURE — G8427 DOCREV CUR MEDS BY ELIG CLIN: HCPCS | Performed by: INTERNAL MEDICINE

## 2018-01-19 RX ORDER — ONDANSETRON 4 MG/1
4 TABLET, FILM COATED ORAL DAILY PRN
Qty: 30 TABLET | Refills: 5 | Status: SHIPPED | OUTPATIENT
Start: 2018-01-19 | End: 2018-02-13 | Stop reason: HOSPADM

## 2018-01-19 RX ORDER — ALBUTEROL SULFATE 90 UG/1
2 AEROSOL, METERED RESPIRATORY (INHALATION) EVERY 6 HOURS PRN
Qty: 1 INHALER | Refills: 5 | Status: SHIPPED | OUTPATIENT
Start: 2018-01-19 | End: 2020-05-15 | Stop reason: SDUPTHER

## 2018-01-19 ASSESSMENT — ENCOUNTER SYMPTOMS
SHORTNESS OF BREATH: 0
VOMITING: 0
ABDOMINAL PAIN: 0
DIARRHEA: 0
NAUSEA: 0
BACK PAIN: 0
BLOOD IN STOOL: 0
COUGH: 0
EYE PAIN: 0
CONSTIPATION: 0

## 2018-01-19 NOTE — PROGRESS NOTES
(ZOFRAN) 4 MG tablet Take 1 tablet by mouth daily as needed for Nausea or Vomiting 30 tablet 5    atorvastatin (LIPITOR) 80 MG tablet take 1 tablet by mouth once daily 30 tablet 6    clopidogrel (PLAVIX) 75 MG tablet Take 1 tablet by mouth daily 30 tablet 6    nitroGLYCERIN (NITROSTAT) 0.4 MG SL tablet Place 1 tablet under the tongue every 5 minutes as needed for Chest pain 25 tablet 3    pantoprazole (PROTONIX) 40 MG tablet Take 1 tablet by mouth daily 30 tablet 11    metoprolol tartrate (LOPRESSOR) 50 MG tablet take 1 tablet by mouth twice a day 180 tablet 3    lisinopril (PRINIVIL;ZESTRIL) 20 MG tablet Take 1 tablet by mouth daily 90 tablet 3    HYDROcodone-acetaminophen (NORCO) 5-325 MG per tablet Take 1 tablet by mouth every 12 hours as needed for Pain      lidocaine (LIDODERM) 5 % Place 1 patch onto the skin daily Use 12-18 hours  0    cyclobenzaprine (FLEXERIL) 10 MG tablet Take 10 mg by mouth 3 times daily as needed for Muscle spasms      aspirin 81 MG tablet Take 81 mg by mouth daily      triamcinolone (KENALOG) 0.1 % cream Apply topically 2 times daily. 1 Tube 3    fexofenadine (ALLEGRA ALLERGY) 180 MG tablet Take 180 mg by mouth daily as needed       No current facility-administered medications for this visit.       No Known Allergies    Health Maintenance   Topic Date Due    Diabetic retinal exam  09/02/1955    Zostavax vaccine  05/05/2018 (Originally 9/2/2005)    DTaP/Tdap/Td vaccine (1 - Tdap) 05/05/2018 (Originally 9/2/1964)    Pneumococcal low/med risk (2 of 2 - PPSV23) 05/05/2018    Diabetic microalbuminuria test  11/01/2018    Lipid screen  11/01/2018    Potassium monitoring  11/01/2018    Diabetic foot exam  11/08/2018    A1C test (Diabetic or Prediabetic)  01/08/2019    Creatinine monitoring  01/08/2019    Colon cancer screen colonoscopy  02/27/2022    Flu vaccine  Completed    AAA screen  Completed    Hepatitis C screen  Completed       Subjective:      Review of Systems Constitutional: Negative for chills and fever. HENT: Negative for hearing loss. Eyes: Negative for pain and visual disturbance. Respiratory: Negative for cough and shortness of breath. Cardiovascular: Negative for chest pain, palpitations and leg swelling. Gastrointestinal: Negative for abdominal pain, blood in stool, constipation, diarrhea, nausea and vomiting. Endocrine: Negative for cold intolerance, polydipsia and polyuria. Genitourinary: Negative for difficulty urinating, dysuria and hematuria. Musculoskeletal: Negative for arthralgias, back pain, gait problem and neck pain. Skin: Negative for pallor and rash. Neurological: Negative for dizziness, weakness, numbness and headaches. Hematological: Negative for adenopathy. Does not bruise/bleed easily. Psychiatric/Behavioral: Negative for confusion. The patient is not nervous/anxious. Objective:     Physical Exam   Constitutional: He is oriented to person, place, and time. He appears well-developed and well-nourished. HENT:   Head: Normocephalic and atraumatic. Eyes: EOM are normal. Pupils are equal, round, and reactive to light. Neck: Neck supple. Cardiovascular: Normal rate and regular rhythm. Exam reveals no gallop and no friction rub. No murmur heard. Pulmonary/Chest: Effort normal and breath sounds normal. He has no wheezes. He has no rales. Abdominal: Soft. He exhibits no distension and no mass. There is no tenderness. There is no rebound. Musculoskeletal: Normal range of motion. He exhibits no edema. Lymphadenopathy:     He has no cervical adenopathy. Neurological: He is alert and oriented to person, place, and time. No cranial nerve deficit (grossly). Skin: Skin is warm and dry. No rash noted. Psychiatric: He has a normal mood and affect. Thought content normal.   Vitals reviewed.     BP (!) 164/84 (Site: Left Arm, Position: Sitting, Cuff Size: Medium Adult)   Pulse 64   Temp 99.1 °F (37.3 °C) (Tympanic)   Resp 16   Ht 5' 7\" (1.702 m)   Wt 153 lb (69.4 kg)   SpO2 96% Comment: room air  BMI 23.96 kg/m²     Assessment:      1. Type 2 diabetes mellitus without complication, without long-term current use of insulin ([facility] [address]     2. Essential hypertension  Juancarlos Brink MD, [facility] [place]   3. Preop testing  Juancarlos Brink MD, [facility] [place]   4. Nausea  ondansetron (ZOFRAN) 4 MG tablet   5. Chronic bronchitis, unspecified chronic bronchitis type (HCC)  albuterol sulfate  (90 Base) MCG/ACT inhaler   6. Coronary artery disease due to lipid rich plaque        Yanes the multiple labs done on 1/8/18, labs overall okay including normal hemoglobin, normal platelet count, normal INR and normal PTT.    1/8/18 EKG also okay with normal sinus rhythm and no ischemic changes with heart rate 64. Given patient's significant coronary artery disease history with status post MI and coronary artery stents, will defer to cardiology to decide if patient needs any additional preoperative cardiac testing. Plan:      Return if symptoms worsen or fail to improve, for Diabetes, Hypertension, CAD. Addition to awaiting cardiology consult, recommend patient hold the aspirin, Plavix and NSAIDs ×1 week before surgery.     Orders Placed This Encounter   Procedures   Juancarlos Brink MD, [facility] [place]     Referral Priority:   Routine     Referral Type:   Consult for Advice and Opinion     Referral Reason:   Specialty Services Required     Referred to Provider:   Enid Lam DO     Requested Specialty:   Cardiology     Number of Visits Requested:   1     Orders Placed This Encounter   Medications    albuterol sulfate  (90 Base) MCG/ACT inhaler     Sig: Inhale 2 puffs into the lungs every 6 hours as needed for Wheezing     Dispense:  1 Inhaler     Refill:  5    ondansetron (ZOFRAN) 4 MG tablet     Sig: Take 1 tablet by mouth daily as needed for Nausea or Vomiting

## 2018-01-26 ENCOUNTER — OFFICE VISIT (OUTPATIENT)
Dept: CARDIOLOGY CLINIC | Age: 73
End: 2018-01-26
Payer: MEDICARE

## 2018-01-26 VITALS
HEIGHT: 67 IN | BODY MASS INDEX: 23.17 KG/M2 | SYSTOLIC BLOOD PRESSURE: 142 MMHG | WEIGHT: 147.6 LBS | HEART RATE: 60 BPM | DIASTOLIC BLOOD PRESSURE: 82 MMHG

## 2018-01-26 DIAGNOSIS — E11.9 TYPE 2 DIABETES MELLITUS WITHOUT COMPLICATION, WITHOUT LONG-TERM CURRENT USE OF INSULIN (HCC): ICD-10-CM

## 2018-01-26 DIAGNOSIS — I21.4 NSTEMI (NON-ST ELEVATED MYOCARDIAL INFARCTION) (HCC): ICD-10-CM

## 2018-01-26 DIAGNOSIS — I25.10 CORONARY ARTERY DISEASE DUE TO LIPID RICH PLAQUE: ICD-10-CM

## 2018-01-26 DIAGNOSIS — E78.2 MIXED HYPERLIPIDEMIA: ICD-10-CM

## 2018-01-26 DIAGNOSIS — I10 ESSENTIAL HYPERTENSION: ICD-10-CM

## 2018-01-26 DIAGNOSIS — I25.83 CORONARY ARTERY DISEASE DUE TO LIPID RICH PLAQUE: ICD-10-CM

## 2018-01-26 DIAGNOSIS — Z01.810 PRE-OPERATIVE CARDIOVASCULAR EXAMINATION: Primary | ICD-10-CM

## 2018-01-26 PROCEDURE — 99213 OFFICE O/P EST LOW 20 MIN: CPT | Performed by: INTERNAL MEDICINE

## 2018-01-26 NOTE — PROGRESS NOTES
knee. Ok to hold aspirin and plavix 5-7 days. Risks, benefits, alternatives, and details discussed extensively. Accepts and consents. 2. Did well with back surgery recently  3. Preserved LV EF. Excellent functional capacity. 4. CAD and NSTEMI in July 2015 s/p PCI . Multivessel. See below cath reports. Optimize meds. 5. NSTEMI 7/7/2015 2 RCA and 1 Circ stent. All were Res[employer] Integrity stents. 6. NSTEMI and Troponin elevation likely secondary to recent MI in [place] while fishing. 7. HTN - stable. Optimize meds  8. HLP - on statin. LDL goal < 70.  9. DM - per pcp  10. See stent reports. Reviewed    Thank you for allowing me to participate in the care of this patient, please do not hesitate to call if you have any questions. Maggy Oliver, [id number] [facility]  [facility] [employer]  [phone]

## 2018-02-13 RX ORDER — MORPHINE SULFATE 15 MG/1
15 TABLET, FILM COATED, EXTENDED RELEASE ORAL 2 TIMES DAILY PRN
COMMUNITY
Start: 2018-02-08 | End: 2018-05-04 | Stop reason: ALTCHOICE

## 2018-02-13 RX ORDER — HYDROCHLOROTHIAZIDE 25 MG/1
25 TABLET ORAL DAILY
COMMUNITY
End: 2021-11-19

## 2018-02-13 RX ORDER — OMEPRAZOLE 20 MG/1
20 CAPSULE, DELAYED RELEASE ORAL DAILY
COMMUNITY
End: 2018-05-04

## 2018-02-13 RX ORDER — LISINOPRIL 10 MG/1
10 TABLET ORAL
COMMUNITY
End: 2018-05-04

## 2018-02-13 RX ORDER — TRAMADOL HYDROCHLORIDE 50 MG/1
50-100 TABLET ORAL EVERY 4 HOURS PRN
COMMUNITY
Start: 2018-02-09 | End: 2018-03-11

## 2018-02-13 RX ORDER — FESOTERODINE FUMARATE 4 MG/1
4 TABLET, EXTENDED RELEASE ORAL DAILY PRN
COMMUNITY
End: 2019-04-22 | Stop reason: ALTCHOICE

## 2018-02-13 RX ORDER — HYDROCODONE BITARTRATE AND ACETAMINOPHEN 5; 325 MG/1; MG/1
1-2 TABLET ORAL
COMMUNITY
End: 2019-08-06 | Stop reason: DRUGHIGH

## 2018-03-19 DIAGNOSIS — I10 ESSENTIAL HYPERTENSION: ICD-10-CM

## 2018-03-19 RX ORDER — METOPROLOL TARTRATE 50 MG/1
TABLET, FILM COATED ORAL
Qty: 180 TABLET | Refills: 3 | Status: SHIPPED | OUTPATIENT
Start: 2018-03-19 | End: 2020-09-11

## 2018-04-03 RX ORDER — CLOPIDOGREL BISULFATE 75 MG/1
TABLET ORAL
Qty: 30 TABLET | Refills: 6 | Status: SHIPPED | OUTPATIENT
Start: 2018-04-03 | End: 2018-11-19 | Stop reason: SDUPTHER

## 2018-04-16 ENCOUNTER — HOSPITAL ENCOUNTER (OUTPATIENT)
Dept: GENERAL RADIOLOGY | Age: 73
Discharge: HOME | End: 2018-04-18
Payer: MEDICARE

## 2018-04-16 ENCOUNTER — OFFICE VISIT (OUTPATIENT)
Dept: PRIMARY CARE CLINIC | Age: 73
End: 2018-04-16
Payer: MEDICARE

## 2018-04-16 VITALS
BODY MASS INDEX: 22.51 KG/M2 | TEMPERATURE: 98 F | DIASTOLIC BLOOD PRESSURE: 80 MMHG | SYSTOLIC BLOOD PRESSURE: 136 MMHG | OXYGEN SATURATION: 95 % | HEART RATE: 80 BPM | HEIGHT: 67 IN | RESPIRATION RATE: 18 BRPM | WEIGHT: 143.4 LBS

## 2018-04-16 DIAGNOSIS — R05.9 COUGH: ICD-10-CM

## 2018-04-16 DIAGNOSIS — J18.9 PNEUMONIA OF RIGHT LOWER LOBE DUE TO INFECTIOUS ORGANISM: Primary | ICD-10-CM

## 2018-04-16 DIAGNOSIS — H60.91 OTITIS EXTERNA OF RIGHT EAR, UNSPECIFIED CHRONICITY, UNSPECIFIED TYPE: ICD-10-CM

## 2018-04-16 LAB
INFLUENZA A ANTIBODY: NEGATIVE
INFLUENZA B ANTIBODY: NEGATIVE

## 2018-04-16 PROCEDURE — 3017F COLORECTAL CA SCREEN DOC REV: CPT | Performed by: NURSE PRACTITIONER

## 2018-04-16 PROCEDURE — 4130F TOPICAL PREP RX AOE: CPT | Performed by: NURSE PRACTITIONER

## 2018-04-16 PROCEDURE — 99214 OFFICE O/P EST MOD 30 MIN: CPT | Performed by: NURSE PRACTITIONER

## 2018-04-16 PROCEDURE — 87804 INFLUENZA ASSAY W/OPTIC: CPT | Performed by: NURSE PRACTITIONER

## 2018-04-16 PROCEDURE — G8598 ASA/ANTIPLAT THER USED: HCPCS | Performed by: NURSE PRACTITIONER

## 2018-04-16 PROCEDURE — 1036F TOBACCO NON-USER: CPT | Performed by: NURSE PRACTITIONER

## 2018-04-16 PROCEDURE — 71046 X-RAY EXAM CHEST 2 VIEWS: CPT

## 2018-04-16 PROCEDURE — 1123F ACP DISCUSS/DSCN MKR DOCD: CPT | Performed by: NURSE PRACTITIONER

## 2018-04-16 PROCEDURE — G8427 DOCREV CUR MEDS BY ELIG CLIN: HCPCS | Performed by: NURSE PRACTITIONER

## 2018-04-16 PROCEDURE — 4040F PNEUMOC VAC/ADMIN/RCVD: CPT | Performed by: NURSE PRACTITIONER

## 2018-04-16 PROCEDURE — G8420 CALC BMI NORM PARAMETERS: HCPCS | Performed by: NURSE PRACTITIONER

## 2018-04-16 RX ORDER — ALBUTEROL SULFATE 90 UG/1
2 AEROSOL, METERED RESPIRATORY (INHALATION) EVERY 6 HOURS PRN
Qty: 1 INHALER | Refills: 0 | Status: SHIPPED | OUTPATIENT
Start: 2018-04-16 | End: 2018-05-04

## 2018-04-16 RX ORDER — LEVOFLOXACIN 750 MG/1
750 TABLET ORAL DAILY
Qty: 5 TABLET | Refills: 0 | Status: SHIPPED | OUTPATIENT
Start: 2018-04-16 | End: 2018-04-21

## 2018-04-16 ASSESSMENT — ENCOUNTER SYMPTOMS
COUGH: 1
CHEST TIGHTNESS: 1

## 2018-04-23 RX ORDER — LISINOPRIL 20 MG/1
TABLET ORAL
Qty: 90 TABLET | Refills: 3 | Status: SHIPPED | OUTPATIENT
Start: 2018-04-23 | End: 2019-05-21 | Stop reason: SDUPTHER

## 2018-05-02 ENCOUNTER — APPOINTMENT (OUTPATIENT)
Dept: INTERVENTIONAL RADIOLOGY/VASCULAR | Age: 73
End: 2018-05-02
Payer: MEDICARE

## 2018-05-02 ENCOUNTER — HOSPITAL ENCOUNTER (EMERGENCY)
Age: 73
Discharge: HOME | End: 2018-05-02
Attending: EMERGENCY MEDICINE
Payer: MEDICARE

## 2018-05-02 ENCOUNTER — APPOINTMENT (OUTPATIENT)
Dept: GENERAL RADIOLOGY | Age: 73
End: 2018-05-02
Payer: MEDICARE

## 2018-05-02 VITALS
TEMPERATURE: 98.5 F | SYSTOLIC BLOOD PRESSURE: 140 MMHG | RESPIRATION RATE: 12 BRPM | HEART RATE: 61 BPM | OXYGEN SATURATION: 98 % | DIASTOLIC BLOOD PRESSURE: 65 MMHG | BODY MASS INDEX: 22.7 KG/M2 | WEIGHT: 145 LBS

## 2018-05-02 DIAGNOSIS — L03.115 CELLULITIS OF RIGHT LOWER EXTREMITY: Primary | ICD-10-CM

## 2018-05-02 PROCEDURE — 73590 X-RAY EXAM OF LOWER LEG: CPT

## 2018-05-02 PROCEDURE — 93971 EXTREMITY STUDY: CPT

## 2018-05-02 PROCEDURE — 99284 EMERGENCY DEPT VISIT MOD MDM: CPT

## 2018-05-02 RX ORDER — CEPHALEXIN 500 MG/1
500 CAPSULE ORAL 2 TIMES DAILY
Qty: 20 CAPSULE | Refills: 0 | Status: SHIPPED | OUTPATIENT
Start: 2018-05-02 | End: 2018-05-17 | Stop reason: ALTCHOICE

## 2018-05-02 RX ORDER — SULFAMETHOXAZOLE AND TRIMETHOPRIM 800; 160 MG/1; MG/1
1 TABLET ORAL 2 TIMES DAILY
Qty: 20 TABLET | Refills: 0 | Status: SHIPPED | OUTPATIENT
Start: 2018-05-02 | End: 2018-05-12

## 2018-05-02 ASSESSMENT — PAIN SCALES - GENERAL: PAINLEVEL_OUTOF10: 5

## 2018-05-02 ASSESSMENT — PAIN DESCRIPTION - ORIENTATION: ORIENTATION: RIGHT;LOWER

## 2018-05-02 ASSESSMENT — PAIN DESCRIPTION - LOCATION: LOCATION: LEG

## 2018-05-03 ENCOUNTER — HOSPITAL ENCOUNTER (OUTPATIENT)
Dept: LAB | Age: 73
Setting detail: SPECIMEN
Discharge: HOME | End: 2018-05-03
Payer: MEDICARE

## 2018-05-03 DIAGNOSIS — E78.5 HYPERLIPIDEMIA, UNSPECIFIED HYPERLIPIDEMIA TYPE: ICD-10-CM

## 2018-05-03 DIAGNOSIS — I10 ESSENTIAL HYPERTENSION: ICD-10-CM

## 2018-05-03 DIAGNOSIS — E11.9 TYPE 2 DIABETES MELLITUS WITHOUT COMPLICATION, WITHOUT LONG-TERM CURRENT USE OF INSULIN (HCC): ICD-10-CM

## 2018-05-03 LAB
ESTIMATED AVERAGE GLUCOSE: 114 MG/DL
HBA1C MFR BLD: 5.6 % (ref 4.8–5.9)

## 2018-05-03 PROCEDURE — 36415 COLL VENOUS BLD VENIPUNCTURE: CPT

## 2018-05-03 PROCEDURE — 83036 HEMOGLOBIN GLYCOSYLATED A1C: CPT

## 2018-05-04 ENCOUNTER — OFFICE VISIT (OUTPATIENT)
Dept: INTERNAL MEDICINE | Age: 73
End: 2018-05-04
Payer: MEDICARE

## 2018-05-04 VITALS
DIASTOLIC BLOOD PRESSURE: 72 MMHG | HEIGHT: 67 IN | HEART RATE: 80 BPM | RESPIRATION RATE: 16 BRPM | BODY MASS INDEX: 22.6 KG/M2 | SYSTOLIC BLOOD PRESSURE: 132 MMHG | WEIGHT: 144 LBS

## 2018-05-04 DIAGNOSIS — E11.9 TYPE 2 DIABETES MELLITUS WITHOUT COMPLICATION, WITHOUT LONG-TERM CURRENT USE OF INSULIN (HCC): Primary | ICD-10-CM

## 2018-05-04 DIAGNOSIS — E78.2 MIXED HYPERLIPIDEMIA: ICD-10-CM

## 2018-05-04 DIAGNOSIS — C61 PROSTATE CANCER (HCC): ICD-10-CM

## 2018-05-04 DIAGNOSIS — I10 ESSENTIAL HYPERTENSION: ICD-10-CM

## 2018-05-04 PROCEDURE — 1123F ACP DISCUSS/DSCN MKR DOCD: CPT | Performed by: INTERNAL MEDICINE

## 2018-05-04 PROCEDURE — 3017F COLORECTAL CA SCREEN DOC REV: CPT | Performed by: INTERNAL MEDICINE

## 2018-05-04 PROCEDURE — 3044F HG A1C LEVEL LT 7.0%: CPT | Performed by: INTERNAL MEDICINE

## 2018-05-04 PROCEDURE — 4040F PNEUMOC VAC/ADMIN/RCVD: CPT | Performed by: INTERNAL MEDICINE

## 2018-05-04 PROCEDURE — G8427 DOCREV CUR MEDS BY ELIG CLIN: HCPCS | Performed by: INTERNAL MEDICINE

## 2018-05-04 PROCEDURE — 2022F DILAT RTA XM EVC RTNOPTHY: CPT | Performed by: INTERNAL MEDICINE

## 2018-05-04 PROCEDURE — G8420 CALC BMI NORM PARAMETERS: HCPCS | Performed by: INTERNAL MEDICINE

## 2018-05-04 PROCEDURE — 1036F TOBACCO NON-USER: CPT | Performed by: INTERNAL MEDICINE

## 2018-05-04 PROCEDURE — 99214 OFFICE O/P EST MOD 30 MIN: CPT | Performed by: INTERNAL MEDICINE

## 2018-05-04 PROCEDURE — G8598 ASA/ANTIPLAT THER USED: HCPCS | Performed by: INTERNAL MEDICINE

## 2018-05-04 RX ORDER — TRAMADOL HYDROCHLORIDE 50 MG/1
TABLET ORAL
Refills: 0 | COMMUNITY
Start: 2018-04-27 | End: 2018-05-04

## 2018-05-04 ASSESSMENT — ENCOUNTER SYMPTOMS
SHORTNESS OF BREATH: 0
VOMITING: 0
EYE PAIN: 0
ABDOMINAL PAIN: 0
CONSTIPATION: 0
COUGH: 0
BLOOD IN STOOL: 0
DIARRHEA: 0
BACK PAIN: 0
NAUSEA: 0

## 2018-05-17 ENCOUNTER — OFFICE VISIT (OUTPATIENT)
Dept: PRIMARY CARE CLINIC | Age: 73
End: 2018-05-17
Payer: MEDICARE

## 2018-05-17 VITALS
BODY MASS INDEX: 21.97 KG/M2 | HEIGHT: 67 IN | HEART RATE: 68 BPM | DIASTOLIC BLOOD PRESSURE: 60 MMHG | SYSTOLIC BLOOD PRESSURE: 122 MMHG | WEIGHT: 140 LBS

## 2018-05-17 DIAGNOSIS — L50.9 URTICARIA: Primary | ICD-10-CM

## 2018-05-17 PROCEDURE — G8427 DOCREV CUR MEDS BY ELIG CLIN: HCPCS | Performed by: PHYSICIAN ASSISTANT

## 2018-05-17 PROCEDURE — 99213 OFFICE O/P EST LOW 20 MIN: CPT | Performed by: PHYSICIAN ASSISTANT

## 2018-05-17 PROCEDURE — G8420 CALC BMI NORM PARAMETERS: HCPCS | Performed by: PHYSICIAN ASSISTANT

## 2018-05-17 PROCEDURE — 3017F COLORECTAL CA SCREEN DOC REV: CPT | Performed by: PHYSICIAN ASSISTANT

## 2018-05-17 PROCEDURE — 4040F PNEUMOC VAC/ADMIN/RCVD: CPT | Performed by: PHYSICIAN ASSISTANT

## 2018-05-17 PROCEDURE — 1123F ACP DISCUSS/DSCN MKR DOCD: CPT | Performed by: PHYSICIAN ASSISTANT

## 2018-05-17 PROCEDURE — 1036F TOBACCO NON-USER: CPT | Performed by: PHYSICIAN ASSISTANT

## 2018-05-17 PROCEDURE — G8598 ASA/ANTIPLAT THER USED: HCPCS | Performed by: PHYSICIAN ASSISTANT

## 2018-05-17 RX ORDER — FAMOTIDINE 40 MG/1
40 TABLET, FILM COATED ORAL EVERY EVENING
Qty: 14 TABLET | Refills: 0 | Status: SHIPPED | OUTPATIENT
Start: 2018-05-17 | End: 2019-05-10 | Stop reason: ALTCHOICE

## 2018-05-17 RX ORDER — PREDNISONE 20 MG/1
20 TABLET ORAL 2 TIMES DAILY
Qty: 10 TABLET | Refills: 0 | Status: SHIPPED | OUTPATIENT
Start: 2018-05-17 | End: 2018-10-15 | Stop reason: SDUPTHER

## 2018-05-17 RX ORDER — FEXOFENADINE HCL 180 MG/1
180 TABLET ORAL DAILY
COMMUNITY
Start: 2018-05-17 | End: 2018-11-09 | Stop reason: ALTCHOICE

## 2018-05-17 ASSESSMENT — ENCOUNTER SYMPTOMS: SHORTNESS OF BREATH: 0

## 2018-05-23 ENCOUNTER — HOSPITAL ENCOUNTER (OUTPATIENT)
Dept: LAB | Age: 73
Setting detail: SPECIMEN
Discharge: HOME | End: 2018-05-23
Payer: MEDICARE

## 2018-05-23 ENCOUNTER — OFFICE VISIT (OUTPATIENT)
Dept: ALLERGY | Age: 73
End: 2018-05-23
Payer: MEDICARE

## 2018-05-23 VITALS
BODY MASS INDEX: 23.3 KG/M2 | DIASTOLIC BLOOD PRESSURE: 64 MMHG | HEART RATE: 74 BPM | WEIGHT: 145 LBS | SYSTOLIC BLOOD PRESSURE: 132 MMHG | HEIGHT: 66 IN

## 2018-05-23 DIAGNOSIS — T78.3XXA ANGIOEDEMA, INITIAL ENCOUNTER: ICD-10-CM

## 2018-05-23 DIAGNOSIS — T78.3XXA ANGIOEDEMA, INITIAL ENCOUNTER: Primary | ICD-10-CM

## 2018-05-23 PROCEDURE — 86162 COMPLEMENT TOTAL (CH50): CPT

## 2018-05-23 PROCEDURE — G8427 DOCREV CUR MEDS BY ELIG CLIN: HCPCS | Performed by: NURSE PRACTITIONER

## 2018-05-23 PROCEDURE — 84155 ASSAY OF PROTEIN SERUM: CPT

## 2018-05-23 PROCEDURE — G8420 CALC BMI NORM PARAMETERS: HCPCS | Performed by: NURSE PRACTITIONER

## 2018-05-23 PROCEDURE — 3017F COLORECTAL CA SCREEN DOC REV: CPT | Performed by: NURSE PRACTITIONER

## 2018-05-23 PROCEDURE — 36415 COLL VENOUS BLD VENIPUNCTURE: CPT

## 2018-05-23 PROCEDURE — 99213 OFFICE O/P EST LOW 20 MIN: CPT | Performed by: NURSE PRACTITIONER

## 2018-05-23 PROCEDURE — 86160 COMPLEMENT ANTIGEN: CPT

## 2018-05-23 PROCEDURE — 86161 COMPLEMENT/FUNCTION ACTIVITY: CPT

## 2018-05-23 PROCEDURE — 83520 IMMUNOASSAY QUANT NOS NONAB: CPT

## 2018-05-23 PROCEDURE — 84165 PROTEIN E-PHORESIS SERUM: CPT

## 2018-05-24 LAB
COMPLEMENT C3: 118 MG/DL (ref 90–180)
COMPLEMENT C4: 15 MG/DL (ref 10–40)

## 2018-05-25 LAB
ALBUMIN (CALCULATED): 4.3 G/DL (ref 3.2–5.2)
ALBUMIN PERCENT: 67 % (ref 45–65)
ALPHA 1 PERCENT: 3 % (ref 3–6)
ALPHA 2 PERCENT: 10 % (ref 6–13)
ALPHA-1-GLOBULIN: 0.2 G/DL (ref 0.1–0.4)
ALPHA-2-GLOBULIN: 0.6 G/DL (ref 0.5–0.9)
BETA GLOBULIN: 0.6 G/DL (ref 0.5–1.1)
BETA PERCENT: 10 % (ref 11–19)
C1 ESTERASE INHIBITOR: 33 MG/DL (ref 21–39)
COMPLEMENT TOTAL (CH50): 134 CAE UNITS (ref 60–144)
GAMMA GLOBULIN %: 10 % (ref 9–20)
GAMMA GLOBULIN: 0.7 G/DL (ref 0.5–1.5)
PATHOLOGIST: ABNORMAL
PROTEIN ELECTROPHORESIS, SERUM: ABNORMAL
TOTAL PROT. SUM,%: 100 % (ref 98–102)
TOTAL PROT. SUM: 6.4 G/DL (ref 6.3–8.2)
TOTAL PROTEIN: 6.4 G/DL (ref 6.4–8.3)
TRYPTASE: 5.9 UG/L

## 2018-05-29 LAB
SEND OUT REPORT: NORMAL
TEST NAME: NORMAL

## 2018-06-04 RX ORDER — TRIAMCINOLONE ACETONIDE 1 MG/G
CREAM TOPICAL
Qty: 1 TUBE | Refills: 3 | Status: SHIPPED | OUTPATIENT
Start: 2018-06-04 | End: 2020-05-15 | Stop reason: SDUPTHER

## 2018-06-19 LAB — COMPLEMENT 1 FUNCTIONAL: NORMAL

## 2018-06-19 RX ORDER — PANTOPRAZOLE SODIUM 40 MG/1
TABLET, DELAYED RELEASE ORAL
Qty: 90 TABLET | Refills: 3 | Status: SHIPPED | OUTPATIENT
Start: 2018-06-19 | End: 2019-07-06 | Stop reason: SDUPTHER

## 2018-07-24 ENCOUNTER — OFFICE VISIT (OUTPATIENT)
Dept: CARDIOLOGY | Age: 73
End: 2018-07-24
Payer: MEDICARE

## 2018-07-24 VITALS
BODY MASS INDEX: 23.66 KG/M2 | WEIGHT: 147.2 LBS | DIASTOLIC BLOOD PRESSURE: 70 MMHG | HEART RATE: 71 BPM | HEIGHT: 66 IN | SYSTOLIC BLOOD PRESSURE: 150 MMHG

## 2018-07-24 DIAGNOSIS — I10 ESSENTIAL HYPERTENSION: ICD-10-CM

## 2018-07-24 DIAGNOSIS — E78.5 HYPERLIPIDEMIA, UNSPECIFIED HYPERLIPIDEMIA TYPE: ICD-10-CM

## 2018-07-24 DIAGNOSIS — I25.10 CORONARY ARTERY DISEASE INVOLVING NATIVE CORONARY ARTERY OF NATIVE HEART WITHOUT ANGINA PECTORIS: Primary | ICD-10-CM

## 2018-07-24 PROCEDURE — 99213 OFFICE O/P EST LOW 20 MIN: CPT | Performed by: INTERNAL MEDICINE

## 2018-07-24 PROCEDURE — 93000 ELECTROCARDIOGRAM COMPLETE: CPT | Performed by: INTERNAL MEDICINE

## 2018-07-24 NOTE — PROGRESS NOTES
pantoprazole (PROTONIX) 40 MG tablet, take 1 tablet by mouth once daily, Disp: 90 tablet, Rfl: 3    triamcinolone (KENALOG) 0.1 % cream, Apply topically 2 times daily. , Disp: 1 Tube, Rfl: 3    fexofenadine (ALLEGRA) 180 MG tablet, Take 1 tablet by mouth daily, Disp: , Rfl:     famotidine (PEPCID) 40 MG tablet, Take 1 tablet by mouth every evening, Disp: 14 tablet, Rfl: 0    lisinopril (PRINIVIL;ZESTRIL) 20 MG tablet, take 1 tablet by mouth once daily, Disp: 90 tablet, Rfl: 3    clopidogrel (PLAVIX) 75 MG tablet, take 1 tablet by mouth once daily, Disp: 30 tablet, Rfl: 6    metoprolol tartrate (LOPRESSOR) 50 MG tablet, take 1 tablet by mouth twice a day, Disp: 180 tablet, Rfl: 3    HYDROcodone-acetaminophen (NORCO) 5-325 MG per tablet, Take 1-2 tablets by mouth every 4-6 hours as needed for Pain., Disp: , Rfl:     fesoterodine (TOVIAZ) 4 MG TB24 ER tablet, Take 4 mg by mouth daily as needed Do not crush or chew, Disp: , Rfl:     hydrochlorothiazide (HYDRODIURIL) 25 MG tablet, Take 25 mg by mouth daily, Disp: , Rfl:     albuterol sulfate  (90 Base) MCG/ACT inhaler, Inhale 2 puffs into the lungs every 6 hours as needed for Wheezing, Disp: 1 Inhaler, Rfl: 5    atorvastatin (LIPITOR) 80 MG tablet, take 1 tablet by mouth once daily, Disp: 30 tablet, Rfl: 6    nitroGLYCERIN (NITROSTAT) 0.4 MG SL tablet, Place 1 tablet under the tongue every 5 minutes as needed for Chest pain, Disp: 25 tablet, Rfl: 3    lidocaine (LIDODERM) 5 %, Place 1 patch onto the skin daily Use 12-18 hours, Disp: , Rfl: 0    cyclobenzaprine (FLEXERIL) 10 MG tablet, Take 10 mg by mouth 3 times daily as needed for Muscle spasms, Disp: , Rfl:     aspirin 81 MG tablet, Take 81 mg by mouth daily, Disp: , Rfl:     TTE 11/18/15  1. All cardiac chambers are normal in dimension. 2. Left ventricular systolic function is normal. Ejection  fraction is 60%. 3. Basal inferior wall is hypokinetic. 4. Grade II diastolic dysfunction.   5. Normal

## 2018-08-02 ENCOUNTER — TELEPHONE (OUTPATIENT)
Dept: CARDIOLOGY | Age: 73
End: 2018-08-02

## 2018-08-02 NOTE — TELEPHONE ENCOUNTER
Glenn Cruz called form Dr Cesilia Doll office. Clearance had already been completed for back injection on 8/6/18 to hold Plavix and they have received it. Pt is now also scheduled for a cervical injection and the clearance has been faxed for that to hold Plavix and aspirin but have not received that back yet. Pt took upon himself to start holding the Plavix and ASA on 7/29/18 because he wants to flip flop the procedures and do the cervical injection first.  Can the procedures be flip since he has already stopped the medication. [facility] She will refax clearance to hold Plavix and asa if needed.     [phone]

## 2018-10-15 ENCOUNTER — OFFICE VISIT (OUTPATIENT)
Dept: PRIMARY CARE CLINIC | Age: 73
End: 2018-10-15
Payer: MEDICARE

## 2018-10-15 VITALS
DIASTOLIC BLOOD PRESSURE: 70 MMHG | OXYGEN SATURATION: 98 % | BODY MASS INDEX: 24.21 KG/M2 | HEART RATE: 59 BPM | SYSTOLIC BLOOD PRESSURE: 138 MMHG | WEIGHT: 150 LBS

## 2018-10-15 DIAGNOSIS — S46.912A STRAIN OF LEFT SHOULDER, INITIAL ENCOUNTER: Primary | ICD-10-CM

## 2018-10-15 DIAGNOSIS — S16.1XXA STRAIN OF NECK MUSCLE, INITIAL ENCOUNTER: ICD-10-CM

## 2018-10-15 PROCEDURE — G8484 FLU IMMUNIZE NO ADMIN: HCPCS | Performed by: FAMILY MEDICINE

## 2018-10-15 PROCEDURE — 1036F TOBACCO NON-USER: CPT | Performed by: FAMILY MEDICINE

## 2018-10-15 PROCEDURE — 99214 OFFICE O/P EST MOD 30 MIN: CPT | Performed by: FAMILY MEDICINE

## 2018-10-15 PROCEDURE — G8598 ASA/ANTIPLAT THER USED: HCPCS | Performed by: FAMILY MEDICINE

## 2018-10-15 PROCEDURE — 4040F PNEUMOC VAC/ADMIN/RCVD: CPT | Performed by: FAMILY MEDICINE

## 2018-10-15 PROCEDURE — 3017F COLORECTAL CA SCREEN DOC REV: CPT | Performed by: FAMILY MEDICINE

## 2018-10-15 PROCEDURE — 1101F PT FALLS ASSESS-DOCD LE1/YR: CPT | Performed by: FAMILY MEDICINE

## 2018-10-15 PROCEDURE — G8420 CALC BMI NORM PARAMETERS: HCPCS | Performed by: FAMILY MEDICINE

## 2018-10-15 PROCEDURE — G8427 DOCREV CUR MEDS BY ELIG CLIN: HCPCS | Performed by: FAMILY MEDICINE

## 2018-10-15 PROCEDURE — 1123F ACP DISCUSS/DSCN MKR DOCD: CPT | Performed by: FAMILY MEDICINE

## 2018-10-15 RX ORDER — PREDNISONE 20 MG/1
40 TABLET ORAL DAILY
Qty: 10 TABLET | Refills: 0 | Status: SHIPPED | OUTPATIENT
Start: 2018-10-15 | End: 2018-10-20

## 2018-10-15 ASSESSMENT — ENCOUNTER SYMPTOMS: COLOR CHANGE: 0

## 2018-10-15 NOTE — PATIENT INSTRUCTIONS
rub the area to relieve pain and help with blood flow. Do not massage the area if it hurts to do so. · Do not do anything that makes the pain worse. Take it easy for a couple of days. You can do your usual activities if they do not hurt your neck or put it at risk for more stress or injury. · Try sleeping on a special neck pillow. Place it under your neck, not under your head. Placing a tightly rolled-up towel under your neck while you sleep will also work. If you use a neck pillow or rolled towel, do not use your regular pillow at the same time. · To prevent future neck pain, do exercises to stretch and strengthen your neck and back. Learn how to use good posture, safe lifting techniques, and proper body mechanics. When should you call for help? Call 911 anytime you think you may need emergency care. For example, call if:    · You are unable to move an arm or a leg at all.   [facility] your doctor now or seek immediate medical care if:    · You have new or worse symptoms in your arms, legs, chest, belly, or buttocks. Symptoms may include:  ¨ Numbness or tingling. ¨ Weakness. ¨ Pain.     · You lose bladder or bowel control.    Watch closely for changes in your health, and be sure to contact your doctor if:    · You are not getting better as expected. Where can you learn more? Go to [email or web address] and sign in to your [employer] account. Enter M253 in the [facility] box to learn more about \"Neck Strain: Care Instructions. \"     If you do not have an account, please click on the \"Sign Up Now\" link. Current as of: November 29, 2017  Content Version: 11.7  © 1959-9815 [employer]. Care instructions adapted under license by [facility] ([facility]). If you have questions about a medical condition or this instruction, always ask your healthcare professional. [address] any warranty or liability for your use of this information.        Patient Education

## 2018-11-06 ENCOUNTER — HOSPITAL ENCOUNTER (OUTPATIENT)
Dept: LAB | Age: 73
Setting detail: SPECIMEN
Discharge: HOME | End: 2018-11-06
Payer: MEDICARE

## 2018-11-06 DIAGNOSIS — E11.9 TYPE 2 DIABETES MELLITUS WITHOUT COMPLICATION, WITHOUT LONG-TERM CURRENT USE OF INSULIN (HCC): ICD-10-CM

## 2018-11-06 DIAGNOSIS — E78.2 MIXED HYPERLIPIDEMIA: ICD-10-CM

## 2018-11-06 DIAGNOSIS — C61 PROSTATE CANCER (HCC): ICD-10-CM

## 2018-11-06 DIAGNOSIS — I10 ESSENTIAL HYPERTENSION: ICD-10-CM

## 2018-11-06 LAB
ALBUMIN SERPL-MCNC: 4.1 G/DL (ref 3.5–5.2)
ALBUMIN/GLOBULIN RATIO: 1.6 (ref 1–2.5)
ALP BLD-CCNC: 120 U/L (ref 40–129)
ALT SERPL-CCNC: 15 U/L (ref 5–41)
ANION GAP SERPL CALCULATED.3IONS-SCNC: 11 MMOL/L (ref 9–17)
AST SERPL-CCNC: 16 U/L
BILIRUB SERPL-MCNC: 0.77 MG/DL (ref 0.3–1.2)
BUN BLDV-MCNC: 19 MG/DL (ref 8–23)
BUN/CREAT BLD: 23 (ref 9–20)
CALCIUM SERPL-MCNC: 9.5 MG/DL (ref 8.6–10.4)
CHLORIDE BLD-SCNC: 102 MMOL/L (ref 98–107)
CHOLESTEROL/HDL RATIO: 3.1
CHOLESTEROL: 125 MG/DL
CO2: 28 MMOL/L (ref 20–31)
CREAT SERPL-MCNC: 0.81 MG/DL (ref 0.7–1.2)
CREATININE URINE: 204.2 MG/DL (ref 39–259)
ESTIMATED AVERAGE GLUCOSE: 120 MG/DL
GFR AFRICAN AMERICAN: >60 ML/MIN
GFR NON-AFRICAN AMERICAN: >60 ML/MIN
GFR SERPL CREATININE-BSD FRML MDRD: ABNORMAL ML/MIN/{1.73_M2}
GFR SERPL CREATININE-BSD FRML MDRD: ABNORMAL ML/MIN/{1.73_M2}
GLUCOSE BLD-MCNC: 102 MG/DL (ref 70–99)
HBA1C MFR BLD: 5.8 % (ref 4.8–5.9)
HDLC SERPL-MCNC: 40 MG/DL
LDL CHOLESTEROL: 59 MG/DL (ref 0–130)
MICROALBUMIN/CREAT 24H UR: <12 MG/L
MICROALBUMIN/CREAT UR-RTO: NORMAL MCG/MG CREAT
POTASSIUM SERPL-SCNC: 4.1 MMOL/L (ref 3.7–5.3)
PROSTATE SPECIFIC ANTIGEN: <0.01 UG/L
SODIUM BLD-SCNC: 141 MMOL/L (ref 135–144)
TOTAL PROTEIN: 6.6 G/DL (ref 6.4–8.3)
TRIGL SERPL-MCNC: 131 MG/DL
VLDLC SERPL CALC-MCNC: ABNORMAL MG/DL (ref 1–30)

## 2018-11-06 PROCEDURE — 80061 LIPID PANEL: CPT

## 2018-11-06 PROCEDURE — 80053 COMPREHEN METABOLIC PANEL: CPT

## 2018-11-06 PROCEDURE — 82043 UR ALBUMIN QUANTITATIVE: CPT

## 2018-11-06 PROCEDURE — 82570 ASSAY OF URINE CREATININE: CPT

## 2018-11-06 PROCEDURE — 83036 HEMOGLOBIN GLYCOSYLATED A1C: CPT

## 2018-11-06 PROCEDURE — 84153 ASSAY OF PSA TOTAL: CPT

## 2018-11-06 PROCEDURE — 36415 COLL VENOUS BLD VENIPUNCTURE: CPT

## 2018-11-09 ENCOUNTER — OFFICE VISIT (OUTPATIENT)
Dept: INTERNAL MEDICINE | Age: 73
End: 2018-11-09
Payer: MEDICARE

## 2018-11-09 VITALS
WEIGHT: 151 LBS | HEART RATE: 68 BPM | DIASTOLIC BLOOD PRESSURE: 78 MMHG | RESPIRATION RATE: 16 BRPM | HEIGHT: 66 IN | BODY MASS INDEX: 24.27 KG/M2 | SYSTOLIC BLOOD PRESSURE: 128 MMHG

## 2018-11-09 DIAGNOSIS — I10 ESSENTIAL HYPERTENSION: ICD-10-CM

## 2018-11-09 DIAGNOSIS — E11.9 TYPE 2 DIABETES MELLITUS WITHOUT COMPLICATION, WITHOUT LONG-TERM CURRENT USE OF INSULIN (HCC): ICD-10-CM

## 2018-11-09 DIAGNOSIS — Z00.00 MEDICARE ANNUAL WELLNESS VISIT, SUBSEQUENT: ICD-10-CM

## 2018-11-09 DIAGNOSIS — E78.5 HYPERLIPIDEMIA, UNSPECIFIED HYPERLIPIDEMIA TYPE: ICD-10-CM

## 2018-11-09 DIAGNOSIS — Z00.00 ROUTINE GENERAL MEDICAL EXAMINATION AT A HEALTH CARE FACILITY: Primary | ICD-10-CM

## 2018-11-09 PROCEDURE — G8427 DOCREV CUR MEDS BY ELIG CLIN: HCPCS | Performed by: INTERNAL MEDICINE

## 2018-11-09 PROCEDURE — 2022F DILAT RTA XM EVC RTNOPTHY: CPT | Performed by: INTERNAL MEDICINE

## 2018-11-09 PROCEDURE — 1101F PT FALLS ASSESS-DOCD LE1/YR: CPT | Performed by: INTERNAL MEDICINE

## 2018-11-09 PROCEDURE — G8484 FLU IMMUNIZE NO ADMIN: HCPCS | Performed by: INTERNAL MEDICINE

## 2018-11-09 PROCEDURE — 3044F HG A1C LEVEL LT 7.0%: CPT | Performed by: INTERNAL MEDICINE

## 2018-11-09 PROCEDURE — G8598 ASA/ANTIPLAT THER USED: HCPCS | Performed by: INTERNAL MEDICINE

## 2018-11-09 PROCEDURE — G0439 PPPS, SUBSEQ VISIT: HCPCS | Performed by: INTERNAL MEDICINE

## 2018-11-09 PROCEDURE — G8420 CALC BMI NORM PARAMETERS: HCPCS | Performed by: INTERNAL MEDICINE

## 2018-11-09 PROCEDURE — 1036F TOBACCO NON-USER: CPT | Performed by: INTERNAL MEDICINE

## 2018-11-09 PROCEDURE — 3017F COLORECTAL CA SCREEN DOC REV: CPT | Performed by: INTERNAL MEDICINE

## 2018-11-09 PROCEDURE — 1123F ACP DISCUSS/DSCN MKR DOCD: CPT | Performed by: INTERNAL MEDICINE

## 2018-11-09 PROCEDURE — 99213 OFFICE O/P EST LOW 20 MIN: CPT | Performed by: INTERNAL MEDICINE

## 2018-11-09 PROCEDURE — 4040F PNEUMOC VAC/ADMIN/RCVD: CPT | Performed by: INTERNAL MEDICINE

## 2018-11-09 ASSESSMENT — LIFESTYLE VARIABLES: HOW OFTEN DO YOU HAVE A DRINK CONTAINING ALCOHOL: 0

## 2018-11-09 ASSESSMENT — ENCOUNTER SYMPTOMS
SHORTNESS OF BREATH: 0
NAUSEA: 0
EYE PAIN: 0
BLOOD IN STOOL: 0
ABDOMINAL PAIN: 0
COUGH: 0
BACK PAIN: 0
VOMITING: 0
CONSTIPATION: 0
DIARRHEA: 0

## 2018-11-09 ASSESSMENT — PATIENT HEALTH QUESTIONNAIRE - PHQ9
SUM OF ALL RESPONSES TO PHQ QUESTIONS 1-9: 0
SUM OF ALL RESPONSES TO PHQ QUESTIONS 1-9: 0

## 2018-11-09 ASSESSMENT — ANXIETY QUESTIONNAIRES: GAD7 TOTAL SCORE: 0

## 2018-11-09 NOTE — PROGRESS NOTES
11/06/2019    Diabetic microalbuminuria test  11/06/2019    Lipid screen  11/06/2019    Potassium monitoring  11/06/2019    Creatinine monitoring  11/06/2019    Colon cancer screen colonoscopy  02/27/2022    AAA screen  Completed    Hepatitis C screen  Completed       Subjective:      Review of Systems   Constitutional: Negative for chills and fever. HENT: Negative for hearing loss. Eyes: Negative for pain and visual disturbance. Respiratory: Negative for cough and shortness of breath. Cardiovascular: Negative for chest pain, palpitations and leg swelling. Gastrointestinal: Negative for abdominal pain, blood in stool, constipation, diarrhea, nausea and vomiting. Endocrine: Negative for cold intolerance, polydipsia and polyuria. Genitourinary: Negative for difficulty urinating, dysuria and hematuria. Musculoskeletal: Negative for arthralgias, back pain, gait problem and neck pain. Skin: Negative for pallor and rash. Neurological: Negative for dizziness, weakness, numbness and headaches. Hematological: Negative for adenopathy. Does not bruise/bleed easily. Psychiatric/Behavioral: Negative for confusion. The patient is not nervous/anxious. Objective:     Physical Exam   Constitutional: He is oriented to person, place, and time. He appears well-developed and well-nourished. HENT:   Head: Normocephalic and atraumatic. Eyes: Pupils are equal, round, and reactive to light. EOM are normal.   Neck: Neck supple. Cardiovascular: Normal rate and regular rhythm. Exam reveals no gallop and no friction rub. No murmur heard. Pulmonary/Chest: Effort normal and breath sounds normal. He has no wheezes. He has no rales. Abdominal: Soft. He exhibits no distension and no mass. There is no tenderness. There is no rebound. Musculoskeletal: Normal range of motion. He exhibits no edema. Lymphadenopathy:     He has no cervical adenopathy.    Neurological: He is alert and oriented to

## 2018-11-09 NOTE — PROGRESS NOTES
MD as PCP - General (Internal Medicine)  Graham Olivas MD as PCP - S Attributed Provider  MD Morris Napoles DO as Consulting Physician (Cardiology)    Wt Readings from Last 3 Encounters:   11/09/18 151 lb (68.5 kg)   10/15/18 150 lb (68 kg)   07/24/18 147 lb 3.2 oz (66.8 kg)     Vitals:    11/09/18 1310   BP: 128/78   Site: Left Upper Arm   Position: Sitting   Cuff Size: Medium Adult   Pulse: 68   Resp: 16   Weight: 151 lb (68.5 kg)   Height: 5' 6\" (1.676 m)     Body mass index is 24.37 kg/m². Patient's complete Health Risk Assessment and screening values have been reviewed and are found in Flowsheets. The following problems were reviewed today and where indicated follow up appointments were made and/or referrals ordered. Positive Risk Factor Screenings with Interventions:     General Health:  General  In general, how would you say your health is?: Fair  In the past 7 days, have you experienced any of the following?: None of These  Do you get the social and emotional support that you need?: Yes  Do you have a Living Will?: (!) No  General Health Risk Interventions:  · No Living Will: patient declined    Health Habits/Nutrition:  Health Habits/Nutrition  Do you exercise for at least 20 minutes 2-3 times per week?: (!) No  Have you lost any weight without trying in the past 3 months?: No  Do you eat fewer than 2 meals per day?: No  Have you seen a dentist within the past year?: Yes  Body mass index is 24.37 kg/m².   Health Habits/Nutrition Interventions:  · Dental exam overdue:  Has an appt coming up with the dentist     Hearing/Vision:  Hearing/Vision  Do you or your family notice any trouble with your hearing?: (!) Yes  Do you have difficulty driving, watching TV, or doing any of your daily activities because of your eyesight?: No  Have you had an eye exam within the past year?: (!) No  Hearing/Vision Interventions:  · Hearing concerns:  patient declines any further evaluation/treatment for hearing issues    Safety:  Safety  Do you have working smoke detectors?: Yes  Have all throw rugs been removed or fastened?: (!) No  Do you have non-slip mats in all bathtubs?: Yes  Do all of your stairways have a railing or banister?: Yes  Are your doorways, halls and stairs free of clutter?: Yes  Do you always fasten your seatbelt when you are in a car?: Yes  Safety Interventions:  · Patient declines any further evaluation/treatment for this issue    Personalized Preventive Plan   Current Health Maintenance Status  Immunization History   Administered Date(s) Administered    Influenza, High Dose (Fluzone 65 yrs and older) 12/23/2015, 11/04/2016, 11/08/2017    Pneumococcal 13-valent Conjugate (Rudie Fam) 12/23/2015, 05/05/2017        Health Maintenance   Topic Date Due    Diabetic retinal exam  09/02/1955    DTaP/Tdap/Td vaccine (1 - Tdap) 09/02/1964    Shingles Vaccine (1 of 2 - 2 Dose Series) 09/02/1995    Pneumococcal low/med risk (2 of 2 - PPSV23) 05/05/2018    Flu vaccine (1) 09/01/2018    Diabetic foot exam  11/08/2018    A1C test (Diabetic or Prediabetic)  11/06/2019    Diabetic microalbuminuria test  11/06/2019    Lipid screen  11/06/2019    Potassium monitoring  11/06/2019    Creatinine monitoring  11/06/2019    Colon cancer screen colonoscopy  02/27/2022    AAA screen  Completed    Hepatitis C screen  Completed     Recommendations for Preventive Services Due: see orders and patient instructions/AVS.  .   Recommended screening schedule for the next 5-10 years is provided to the patient in written form: see Patient Instructions/AVS.

## 2018-11-19 RX ORDER — CLOPIDOGREL BISULFATE 75 MG/1
TABLET ORAL
Qty: 30 TABLET | Refills: 6 | Status: SHIPPED | OUTPATIENT
Start: 2018-11-19 | End: 2019-09-30 | Stop reason: SDUPTHER

## 2018-12-18 RX ORDER — ATORVASTATIN CALCIUM 80 MG/1
TABLET, FILM COATED ORAL
Qty: 90 TABLET | Refills: 3 | Status: SHIPPED | OUTPATIENT
Start: 2018-12-18 | End: 2019-09-30 | Stop reason: SDUPTHER

## 2019-01-13 ENCOUNTER — OFFICE VISIT (OUTPATIENT)
Dept: PRIMARY CARE CLINIC | Age: 74
End: 2019-01-13
Payer: MEDICARE

## 2019-01-13 VITALS
WEIGHT: 149.8 LBS | TEMPERATURE: 98 F | SYSTOLIC BLOOD PRESSURE: 126 MMHG | HEART RATE: 74 BPM | BODY MASS INDEX: 24.18 KG/M2 | OXYGEN SATURATION: 98 % | DIASTOLIC BLOOD PRESSURE: 80 MMHG

## 2019-01-13 DIAGNOSIS — G89.29 CHRONIC LEFT SHOULDER PAIN: Primary | ICD-10-CM

## 2019-01-13 DIAGNOSIS — M25.512 CHRONIC LEFT SHOULDER PAIN: Primary | ICD-10-CM

## 2019-01-13 PROCEDURE — 1101F PT FALLS ASSESS-DOCD LE1/YR: CPT | Performed by: FAMILY MEDICINE

## 2019-01-13 PROCEDURE — 4040F PNEUMOC VAC/ADMIN/RCVD: CPT | Performed by: FAMILY MEDICINE

## 2019-01-13 PROCEDURE — G8420 CALC BMI NORM PARAMETERS: HCPCS | Performed by: FAMILY MEDICINE

## 2019-01-13 PROCEDURE — G8598 ASA/ANTIPLAT THER USED: HCPCS | Performed by: FAMILY MEDICINE

## 2019-01-13 PROCEDURE — 99213 OFFICE O/P EST LOW 20 MIN: CPT | Performed by: FAMILY MEDICINE

## 2019-01-13 PROCEDURE — 3017F COLORECTAL CA SCREEN DOC REV: CPT | Performed by: FAMILY MEDICINE

## 2019-01-13 PROCEDURE — 1123F ACP DISCUSS/DSCN MKR DOCD: CPT | Performed by: FAMILY MEDICINE

## 2019-01-13 PROCEDURE — 1036F TOBACCO NON-USER: CPT | Performed by: FAMILY MEDICINE

## 2019-01-13 PROCEDURE — G8427 DOCREV CUR MEDS BY ELIG CLIN: HCPCS | Performed by: FAMILY MEDICINE

## 2019-01-13 PROCEDURE — G8484 FLU IMMUNIZE NO ADMIN: HCPCS | Performed by: FAMILY MEDICINE

## 2019-01-13 RX ORDER — METHYLPREDNISOLONE 4 MG/1
TABLET ORAL
Qty: 1 KIT | Refills: 0 | Status: SHIPPED | OUTPATIENT
Start: 2019-01-13 | End: 2019-01-19

## 2019-02-05 ENCOUNTER — OFFICE VISIT (OUTPATIENT)
Dept: CARDIOLOGY | Age: 74
End: 2019-02-05
Payer: MEDICARE

## 2019-02-05 VITALS
SYSTOLIC BLOOD PRESSURE: 142 MMHG | BODY MASS INDEX: 24.59 KG/M2 | HEIGHT: 66 IN | DIASTOLIC BLOOD PRESSURE: 78 MMHG | HEART RATE: 64 BPM | WEIGHT: 153 LBS

## 2019-02-05 DIAGNOSIS — I21.4 NSTEMI (NON-ST ELEVATED MYOCARDIAL INFARCTION) (HCC): ICD-10-CM

## 2019-02-05 DIAGNOSIS — I25.10 CORONARY ARTERY DISEASE INVOLVING NATIVE CORONARY ARTERY OF NATIVE HEART WITHOUT ANGINA PECTORIS: Primary | ICD-10-CM

## 2019-02-05 DIAGNOSIS — I10 ESSENTIAL HYPERTENSION: ICD-10-CM

## 2019-02-05 DIAGNOSIS — E78.5 HYPERLIPIDEMIA, UNSPECIFIED HYPERLIPIDEMIA TYPE: ICD-10-CM

## 2019-02-05 PROCEDURE — G8427 DOCREV CUR MEDS BY ELIG CLIN: HCPCS | Performed by: INTERNAL MEDICINE

## 2019-02-05 PROCEDURE — G8598 ASA/ANTIPLAT THER USED: HCPCS | Performed by: INTERNAL MEDICINE

## 2019-02-05 PROCEDURE — 4040F PNEUMOC VAC/ADMIN/RCVD: CPT | Performed by: INTERNAL MEDICINE

## 2019-02-05 PROCEDURE — 1123F ACP DISCUSS/DSCN MKR DOCD: CPT | Performed by: INTERNAL MEDICINE

## 2019-02-05 PROCEDURE — 1101F PT FALLS ASSESS-DOCD LE1/YR: CPT | Performed by: INTERNAL MEDICINE

## 2019-02-05 PROCEDURE — G8420 CALC BMI NORM PARAMETERS: HCPCS | Performed by: INTERNAL MEDICINE

## 2019-02-05 PROCEDURE — 1036F TOBACCO NON-USER: CPT | Performed by: INTERNAL MEDICINE

## 2019-02-05 PROCEDURE — G8484 FLU IMMUNIZE NO ADMIN: HCPCS | Performed by: INTERNAL MEDICINE

## 2019-02-05 PROCEDURE — 99213 OFFICE O/P EST LOW 20 MIN: CPT | Performed by: INTERNAL MEDICINE

## 2019-02-05 PROCEDURE — 3017F COLORECTAL CA SCREEN DOC REV: CPT | Performed by: INTERNAL MEDICINE

## 2019-02-05 PROCEDURE — 93000 ELECTROCARDIOGRAM COMPLETE: CPT | Performed by: INTERNAL MEDICINE

## 2019-03-08 ENCOUNTER — TELEPHONE (OUTPATIENT)
Dept: CARDIOLOGY | Age: 74
End: 2019-03-08

## 2019-04-22 RX ORDER — FEXOFENADINE HCL 180 MG/1
180 TABLET ORAL DAILY
COMMUNITY
End: 2021-11-19 | Stop reason: SDUPTHER

## 2019-04-22 RX ORDER — FLUTICASONE FUROATE AND VILANTEROL 100; 25 UG/1; UG/1
1 POWDER RESPIRATORY (INHALATION) DAILY
COMMUNITY
End: 2020-05-15 | Stop reason: ALTCHOICE

## 2019-04-22 RX ORDER — ONDANSETRON 4 MG/1
4 TABLET, FILM COATED ORAL EVERY 8 HOURS PRN
COMMUNITY
End: 2019-05-10 | Stop reason: SDUPTHER

## 2019-05-10 ENCOUNTER — HOSPITAL ENCOUNTER (OUTPATIENT)
Dept: LAB | Age: 74
Discharge: HOME | End: 2019-05-10
Payer: MEDICARE

## 2019-05-10 ENCOUNTER — OFFICE VISIT (OUTPATIENT)
Dept: INTERNAL MEDICINE | Age: 74
End: 2019-05-10
Payer: MEDICARE

## 2019-05-10 VITALS
HEIGHT: 66 IN | DIASTOLIC BLOOD PRESSURE: 70 MMHG | BODY MASS INDEX: 24.75 KG/M2 | WEIGHT: 154 LBS | HEART RATE: 60 BPM | RESPIRATION RATE: 16 BRPM | SYSTOLIC BLOOD PRESSURE: 122 MMHG

## 2019-05-10 DIAGNOSIS — R11.0 NAUSEA: ICD-10-CM

## 2019-05-10 DIAGNOSIS — E11.9 TYPE 2 DIABETES MELLITUS WITHOUT COMPLICATION, WITHOUT LONG-TERM CURRENT USE OF INSULIN (HCC): ICD-10-CM

## 2019-05-10 DIAGNOSIS — Z23 NEED FOR VACCINATION: ICD-10-CM

## 2019-05-10 DIAGNOSIS — C61 PROSTATE CANCER (HCC): ICD-10-CM

## 2019-05-10 DIAGNOSIS — E78.5 HYPERLIPIDEMIA, UNSPECIFIED HYPERLIPIDEMIA TYPE: ICD-10-CM

## 2019-05-10 DIAGNOSIS — E11.9 TYPE 2 DIABETES MELLITUS WITHOUT COMPLICATION, WITHOUT LONG-TERM CURRENT USE OF INSULIN (HCC): Primary | ICD-10-CM

## 2019-05-10 DIAGNOSIS — I10 ESSENTIAL HYPERTENSION: ICD-10-CM

## 2019-05-10 DIAGNOSIS — T78.3XXA ANGIOEDEMA, INITIAL ENCOUNTER: ICD-10-CM

## 2019-05-10 LAB
ESTIMATED AVERAGE GLUCOSE: 126 MG/DL
HBA1C MFR BLD: 6 % (ref 4.8–5.9)

## 2019-05-10 PROCEDURE — 90732 PPSV23 VACC 2 YRS+ SUBQ/IM: CPT | Performed by: INTERNAL MEDICINE

## 2019-05-10 PROCEDURE — 36415 COLL VENOUS BLD VENIPUNCTURE: CPT

## 2019-05-10 PROCEDURE — 3044F HG A1C LEVEL LT 7.0%: CPT | Performed by: INTERNAL MEDICINE

## 2019-05-10 PROCEDURE — G0009 ADMIN PNEUMOCOCCAL VACCINE: HCPCS | Performed by: INTERNAL MEDICINE

## 2019-05-10 PROCEDURE — 3017F COLORECTAL CA SCREEN DOC REV: CPT | Performed by: INTERNAL MEDICINE

## 2019-05-10 PROCEDURE — G8427 DOCREV CUR MEDS BY ELIG CLIN: HCPCS | Performed by: INTERNAL MEDICINE

## 2019-05-10 PROCEDURE — 4040F PNEUMOC VAC/ADMIN/RCVD: CPT | Performed by: INTERNAL MEDICINE

## 2019-05-10 PROCEDURE — 2022F DILAT RTA XM EVC RTNOPTHY: CPT | Performed by: INTERNAL MEDICINE

## 2019-05-10 PROCEDURE — G8420 CALC BMI NORM PARAMETERS: HCPCS | Performed by: INTERNAL MEDICINE

## 2019-05-10 PROCEDURE — 1123F ACP DISCUSS/DSCN MKR DOCD: CPT | Performed by: INTERNAL MEDICINE

## 2019-05-10 PROCEDURE — 83520 IMMUNOASSAY QUANT NOS NONAB: CPT

## 2019-05-10 PROCEDURE — G8598 ASA/ANTIPLAT THER USED: HCPCS | Performed by: INTERNAL MEDICINE

## 2019-05-10 PROCEDURE — 1036F TOBACCO NON-USER: CPT | Performed by: INTERNAL MEDICINE

## 2019-05-10 PROCEDURE — 99214 OFFICE O/P EST MOD 30 MIN: CPT | Performed by: INTERNAL MEDICINE

## 2019-05-10 PROCEDURE — 83036 HEMOGLOBIN GLYCOSYLATED A1C: CPT

## 2019-05-10 RX ORDER — ONDANSETRON 4 MG/1
4 TABLET, FILM COATED ORAL EVERY 8 HOURS PRN
Qty: 30 TABLET | Refills: 0 | Status: SHIPPED | OUTPATIENT
Start: 2019-05-10 | End: 2020-02-13

## 2019-05-10 ASSESSMENT — PATIENT HEALTH QUESTIONNAIRE - PHQ9
2. FEELING DOWN, DEPRESSED OR HOPELESS: 0
SUM OF ALL RESPONSES TO PHQ QUESTIONS 1-9: 0
SUM OF ALL RESPONSES TO PHQ9 QUESTIONS 1 & 2: 0
1. LITTLE INTEREST OR PLEASURE IN DOING THINGS: 0
SUM OF ALL RESPONSES TO PHQ QUESTIONS 1-9: 0

## 2019-05-10 ASSESSMENT — ENCOUNTER SYMPTOMS
ABDOMINAL PAIN: 0
BLOOD IN STOOL: 0
BACK PAIN: 0
COUGH: 0
DIARRHEA: 0
SHORTNESS OF BREATH: 0
NAUSEA: 0
CONSTIPATION: 0
EYE PAIN: 0
VOMITING: 0

## 2019-05-10 NOTE — PATIENT INSTRUCTIONS
Patient Education        Learning About Diabetes Food Guidelines  Your Care Instructions    Meal planning is important to manage diabetes. It helps keep your blood sugar at a target level (which you set with your doctor). You don't have to eat special foods. You can eat what your family eats, including sweets once in a while. But you do have to pay attention to how often you eat and how much you eat of certain foods. You may want to work with a dietitian or a certified diabetes educator (CDE) to help you plan meals and snacks. A dietitian or CDE can also help you lose weight if that is one of your goals. What should you know about eating carbs? Managing the amount of carbohydrate (carbs) you eat is an important part of healthy meals when you have diabetes. Carbohydrate is found in many foods. · Learn which foods have carbs. And learn the amounts of carbs in different foods. ? Bread, cereal, pasta, and rice have about 15 grams of carbs in a serving. A serving is 1 slice of bread (1 ounce), ½ cup of cooked cereal, or 1/3 cup of cooked pasta or rice. ? Fruits have 15 grams of carbs in a serving. A serving is 1 small fresh fruit, such as an apple or orange; ½ of a banana; ½ cup of cooked or canned fruit; ½ cup of fruit juice; 1 cup of melon or raspberries; or 2 tablespoons of dried fruit. ? Milk and no-sugar-added yogurt have 15 grams of carbs in a serving. A serving is 1 cup of milk or 2/3 cup of no-sugar-added yogurt. ? Starchy vegetables have 15 grams of carbs in a serving. A serving is ½ cup of mashed potatoes or sweet potato; 1 cup winter squash; ½ of a small baked potato; ½ cup of cooked beans; or ½ cup cooked corn or green peas. · Learn how much carbs to eat each day and at each meal. A dietitian or CDE can teach you how to keep track of the amount of carbs you eat. This is called carbohydrate counting. · If you are not sure how to count carbohydrate grams, use the Plate Method to plan meals.  It is a when cooking. · Don't skip meals. Your blood sugar may drop too low if you skip meals and take insulin or certain medicines for diabetes. · Check with your doctor before you drink alcohol. Alcohol can cause your blood sugar to drop too low. Alcohol can also cause a bad reaction if you take certain diabetes medicines. Follow-up care is a key part of your treatment and safety. Be sure to make and go to all appointments, and call your doctor if you are having problems. It's also a good idea to know your test results and keep a list of the medicines you take. Where can you learn more? Go to [email or web address] and sign in to your [employer] account. Enter N121 in the CDEL box to learn more about \"Learning About Diabetes Food Guidelines. \"     If you do not have an account, please click on the \"Sign Up Now\" link. Current as of: July 25, 2018  Content Version: 12.0  © 5621-0017 Healthwise, Incorporated. Care instructions adapted under license by [facility] ([facility]). If you have questions about a medical condition or this instruction, always ask your healthcare professional. [place] [address] any warranty or liability for your use of this information.

## 2019-05-10 NOTE — PROGRESS NOTES
[address] [facility]  [place] [address] [id number]  Dept: [phone]  Dept Fax: [phone]  Loc: [phone]     Marvin Hyatt is a 68 y.o. male who presents today for his medical conditions/complaintsas noted below. Marvin Hyatt is c/o of   Chief Complaint   Patient presents with    Diabetes     6 month appt    Hypertension    Hyperlipidemia         HPI:     Diabetes   He presents for his follow-up diabetic visit. He has type 2 (without Complication and without insulin use) diabetes mellitus. His disease course has been fluctuating. Pertinent negatives for hypoglycemia include no confusion, dizziness, headaches, nervousness/anxiousness or pallor. Pertinent negatives for diabetes include no chest pain, no polydipsia, no polyuria and no weakness. Hypertension   This is a chronic problem. The current episode started more than 1 year ago. The problem has been waxing and waning since onset. The problem is controlled. Pertinent negatives include no chest pain, headaches, neck pain, palpitations or shortness of breath. Hyperlipidemia   This is a chronic problem. The current episode started more than 1 year ago. The problem is controlled. Recent lipid tests were reviewed and are variable. Pertinent negatives include no chest pain or shortness of breath. Hemoglobin A1C (%)   Date Value   05/10/2019 6.0 (H)   11/06/2018 5.8   05/03/2018 5.6            Microalb/Crt.  Ratio (mcg/mg creat)   Date Value   11/06/2018 CANNOT BE CALCULATED     LDL Cholesterol (mg/dL)   Date Value   11/06/2018 59   11/01/2017 59   11/03/2016 65         AST (U/L)   Date Value   11/06/2018 16     ALT (U/L)   Date Value   11/06/2018 15     BUN (mg/dL)   Date Value   11/06/2018 19     BP Readings from Last 3 Encounters:   05/10/19 122/70   02/05/19 (!) 142/78   01/13/19 126/80              Past Medical History:   Diagnosis Date    Chronic bronchitis (HCC)     DJD (degenerative joint disease)  GERD (gastroesophageal reflux disease)     Hearing loss     Mild    Hyperlipidemia     Hypertension     Osteoarthritis of left knee     Overactive bladder     Prostate cancer (HCC)     Status post radical prostatectomy in . Continues to follow with Dr. Jina Colon every 6 months.  Seasonal allergies     Spondylosis     Type II or unspecified type diabetes mellitus without mention of complication, not stated as uncontrolled     Diet controlled      Past Surgical History:   Procedure Laterality Date    CARDIAC SURGERY      COLONOSCOPY  03/15/2002    Dr. Ivelisse Ng      2015 drug-eluting stents placed 2 to the RCA and one to the left circumflex    OTHER SURGICAL HISTORY  2009    bilateral L3-4, L4-5, L5-S1 steroid facet injection     NC COLON CA SCRN NOT HI RSK IND N/A 2017    COL[facility]  diverticulosis    PROSTATECTOMY  2010    Dr. Alessandra Gonzalez Left 2013    TOTAL KNEE ARTHROPLASTY Right 2018    Dr. Parul Culver [place]/ [address]    UPPER GASTROINTESTINAL ENDOSCOPY  03/15/2002    Dr. Silvio Escobar       Family History   Problem Relation Age of Onset    Stroke Father           Social History     Tobacco Use    Smoking status: Former Smoker     Packs/day: 0.50     Years: 20.00     Pack years: 10.00     Last attempt to quit: 1987     Years since quittin.0    Smokeless tobacco: Never Used    Tobacco comment: Quit 30 years ago.  Cee RCP   Substance Use Topics    Alcohol use: No     Alcohol/week: 0.0 oz         Current Outpatient Medications   Medication Sig Dispense Refill    ondansetron (ZOFRAN) 4 MG tablet Take 1 tablet by mouth every 8 hours as needed for Nausea or Vomiting 30 tablet 0    fluticasone-vilanterol (BREO ELLIPTA) 100-25 MCG/INH AEPB inhaler Inhale 1 puff into the lungs daily      atorvastatin (LIPITOR) 80 MG tablet take 1 tablet by mouth once daily 90 tablet 3    clopidogrel (PLAVIX) 75 MG tablet take 1 tablet by mouth once daily 30 tablet 6    pantoprazole (PROTONIX) 40 MG tablet take 1 tablet by mouth once daily 90 tablet 3    triamcinolone (KENALOG) 0.1 % cream Apply topically 2 times daily. 1 Tube 3    lisinopril (PRINIVIL;ZESTRIL) 20 MG tablet take 1 tablet by mouth once daily 90 tablet 3    metoprolol tartrate (LOPRESSOR) 50 MG tablet take 1 tablet by mouth twice a day 180 tablet 3    HYDROcodone-acetaminophen (NORCO) 5-325 MG per tablet Take 1-2 tablets by mouth every 4-6 hours as needed for Pain.  hydrochlorothiazide (HYDRODIURIL) 25 MG tablet Take 25 mg by mouth daily      albuterol sulfate  (90 Base) MCG/ACT inhaler Inhale 2 puffs into the lungs every 6 hours as needed for Wheezing 1 Inhaler 5    lidocaine (LIDODERM) 5 % Place 1 patch onto the skin daily Use 12-18 hours  0    cyclobenzaprine (FLEXERIL) 10 MG tablet Take 10 mg by mouth 3 times daily as needed for Muscle spasms      aspirin 81 MG tablet Take 81 mg by mouth daily      fexofenadine (ALLEGRA) 180 MG tablet Take 180 mg by mouth daily      nitroGLYCERIN (NITROSTAT) 0.4 MG SL tablet Place 1 tablet under the tongue every 5 minutes as needed for Chest pain 25 tablet 3     No current facility-administered medications for this visit.       Allergies   Allergen Reactions    Zonisamide Other (See Comments)     ANXIETY       Health Maintenance   Topic Date Due    Diabetic retinal exam  09/02/1955    DTaP/Tdap/Td vaccine (1 - Tdap) 09/02/1964    Diabetic foot exam  11/08/2018    Shingles Vaccine (1 of 2) 05/10/2020 (Originally 9/2/1995)    Flu vaccine (Season Ended) 09/01/2019    Diabetic microalbuminuria test  11/06/2019    Lipid screen  11/06/2019    Potassium monitoring  11/06/2019    Creatinine monitoring  11/06/2019    A1C test (Diabetic or Prediabetic)  05/10/2020    Colon cancer screen colonoscopy  02/27/2022    Pneumococcal 65+ years Vaccine  Completed    AAA screen  Completed    Hepatitis C screen  Completed       Subjective:      Review of Systems   Constitutional: Negative for chills and fever. HENT: Negative for hearing loss. Eyes: Negative for pain and visual disturbance. Respiratory: Negative for cough and shortness of breath. Cardiovascular: Negative for chest pain, palpitations and leg swelling. Gastrointestinal: Negative for abdominal pain, blood in stool, constipation, diarrhea, nausea and vomiting. Endocrine: Negative for cold intolerance, polydipsia and polyuria. Genitourinary: Negative for difficulty urinating, dysuria and hematuria. Musculoskeletal: Negative for arthralgias, back pain, gait problem and neck pain. Skin: Negative for pallor and rash. Neurological: Negative for dizziness, weakness, numbness and headaches. Hematological: Negative for adenopathy. Does not bruise/bleed easily. Psychiatric/Behavioral: Negative for confusion. The patient is not nervous/anxious. Objective:     Physical Exam   Constitutional: He is oriented to person, place, and time. He appears well-developed and well-nourished. HENT:   Head: Normocephalic and atraumatic. Eyes: Pupils are equal, round, and reactive to light. EOM are normal.   Neck: Neck supple. Cardiovascular: Normal rate and regular rhythm. Exam reveals no gallop and no friction rub. No murmur heard. Pulmonary/Chest: Effort normal and breath sounds normal. He has no wheezes. He has no rales. Abdominal: Soft. He exhibits no distension and no mass. There is no tenderness. There is no rebound. Musculoskeletal: Normal range of motion. He exhibits no edema. Lymphadenopathy:     He has no cervical adenopathy. Neurological: He is alert and oriented to person, place, and time. No cranial nerve deficit (grossly).    Diabetic foot exam is normal, including normal monofilament sensory test.  Normal vibration sense and normal pulses bilaterally   Skin: Skin is warm and dry. No rash noted. Psychiatric: He has a normal mood and affect. Thought content normal.   Vitals reviewed. /70 (Site: Right Upper Arm, Position: Sitting, Cuff Size: Medium Adult)   Pulse 60   Resp 16   Ht 5' 6\" (1.676 m)   Wt 154 lb (69.9 kg)   BMI 24.86 kg/m²     Assessment:       Diagnosis Orders   1. Type 2 diabetes mellitus without complication, without long-term current use of insulin (HCC)  Comprehensive Metabolic Panel    Hemoglobin A1C    Microalbumin, Ur     DIABETES FOOT EXAM   2. Essential hypertension  Hemoglobin    Iron And TIBC    Comprehensive Metabolic Panel    Lipid Panel    Hemoglobin A1C    Microalbumin, Ur   3. Hyperlipidemia, unspecified hyperlipidemia type  Hemoglobin    Iron And TIBC    Comprehensive Metabolic Panel    Lipid Panel   4. Prostate cancer (HCC)  PSA, Diagnostic   5. Need for vaccination  Pneumococcal polysaccharide vaccine 23-valent greater than or equal to 3yo subcutaneous/IM   6. Nausea  ondansetron (ZOFRAN) 4 MG tablet             Plan:       Return in about 6 months (around 11/10/2019) for medicare AWV, Hypertension, Hyperlipidemia, Diabetes. Orders Placed This Encounter   Procedures    Pneumococcal polysaccharide vaccine 23-valent greater than or equal to 3yo subcutaneous/IM     V03.82    Hemoglobin     Standing Status:   Future     Standing Expiration Date:   5/10/2020    Iron And TIBC     Standing Status:   Future     Standing Expiration Date:   5/10/2020     Order Specific Question:   Is Patient Fasting? Answer:   no     Order Specific Question:   No of Hours?      Answer:   no    Comprehensive Metabolic Panel     Standing Status:   Future     Standing Expiration Date:   5/9/2020    Lipid Panel     Standing Status:   Future     Standing Expiration Date:   5/10/2020     Order Specific Question:   Is Patient Fasting?/# of Hours     Answer:   15    PSA, Diagnostic     Standing Status:   Future Standing Expiration Date:   5/9/2020    Hemoglobin A1C     Standing Status:   Future     Standing Expiration Date:   5/10/2020    Microalbumin, Ur     Standing Status:   Future     Standing Expiration Date:   5/9/2020     DIABETES FOOT EXAM     Orders Placed This Encounter   Medications    ondansetron (ZOFRAN) 4 MG tablet     Sig: Take 1 tablet by mouth every 8 hours as needed for Nausea or Vomiting     Dispense:  30 tablet     Refill:  0       Patientgiven educational materials - see patient instructions. Discussed use, benefit,and side effects of prescribed medications. All patient questions answered. Ptvoiced understanding. Reviewed health maintenance. Instructed to continue currentmedications, diet and exercise. Patient agreed with treatment plan. Follow up asdirected.      Electronically signed by Christoph Grande MD on 5/10/2019 at 1:13 PM

## 2019-05-12 LAB — TRYPTASE: 8.3 UG/L

## 2019-05-15 ENCOUNTER — HOSPITAL ENCOUNTER (OUTPATIENT)
Dept: LAB | Age: 74
Discharge: HOME | End: 2019-05-15
Payer: MEDICARE

## 2019-05-15 DIAGNOSIS — I10 ESSENTIAL HYPERTENSION: ICD-10-CM

## 2019-05-15 DIAGNOSIS — Z86.2 HISTORY OF ANEMIA: Primary | ICD-10-CM

## 2019-05-15 DIAGNOSIS — E78.5 HYPERLIPIDEMIA, UNSPECIFIED HYPERLIPIDEMIA TYPE: ICD-10-CM

## 2019-05-15 DIAGNOSIS — Z86.2 HISTORY OF ANEMIA: ICD-10-CM

## 2019-05-15 LAB
HEMOGLOBIN: 13.6 G/DL (ref 13.5–17.5)
IRON SATURATION: 30 % (ref 20–55)
IRON: 81 UG/DL (ref 59–158)
TOTAL IRON BINDING CAPACITY: 268 UG/DL (ref 250–450)
UNSATURATED IRON BINDING CAPACITY: 187 UG/DL (ref 112–347)

## 2019-05-15 PROCEDURE — 83540 ASSAY OF IRON: CPT

## 2019-05-15 PROCEDURE — 85018 HEMOGLOBIN: CPT

## 2019-05-15 PROCEDURE — 83550 IRON BINDING TEST: CPT

## 2019-05-15 PROCEDURE — 36415 COLL VENOUS BLD VENIPUNCTURE: CPT

## 2019-05-21 RX ORDER — LISINOPRIL 20 MG/1
TABLET ORAL
Qty: 90 TABLET | Refills: 3 | Status: SHIPPED | OUTPATIENT
Start: 2019-05-21 | End: 2019-08-06 | Stop reason: ALTCHOICE

## 2019-06-22 DIAGNOSIS — I10 ESSENTIAL HYPERTENSION: ICD-10-CM

## 2019-06-24 RX ORDER — METOPROLOL TARTRATE 50 MG/1
TABLET, FILM COATED ORAL
Qty: 180 TABLET | Refills: 3 | Status: SHIPPED | OUTPATIENT
Start: 2019-06-24 | End: 2019-08-06 | Stop reason: SDUPTHER

## 2019-07-08 RX ORDER — PANTOPRAZOLE SODIUM 40 MG/1
TABLET, DELAYED RELEASE ORAL
Qty: 90 TABLET | Refills: 3 | Status: SHIPPED | OUTPATIENT
Start: 2019-07-08 | End: 2020-04-01

## 2019-08-06 ENCOUNTER — OFFICE VISIT (OUTPATIENT)
Dept: CARDIOLOGY | Age: 74
End: 2019-08-06
Payer: MEDICARE

## 2019-08-06 ENCOUNTER — TELEPHONE (OUTPATIENT)
Dept: CARDIOLOGY | Age: 74
End: 2019-08-06

## 2019-08-06 VITALS
HEIGHT: 66 IN | BODY MASS INDEX: 25.07 KG/M2 | WEIGHT: 156 LBS | RESPIRATION RATE: 14 BRPM | SYSTOLIC BLOOD PRESSURE: 140 MMHG | HEART RATE: 67 BPM | DIASTOLIC BLOOD PRESSURE: 72 MMHG

## 2019-08-06 DIAGNOSIS — E78.5 HYPERLIPIDEMIA, UNSPECIFIED HYPERLIPIDEMIA TYPE: ICD-10-CM

## 2019-08-06 DIAGNOSIS — I21.4 NSTEMI (NON-ST ELEVATED MYOCARDIAL INFARCTION) (HCC): ICD-10-CM

## 2019-08-06 DIAGNOSIS — I10 ESSENTIAL HYPERTENSION: Primary | ICD-10-CM

## 2019-08-06 DIAGNOSIS — I25.10 CORONARY ARTERY DISEASE INVOLVING NATIVE CORONARY ARTERY OF NATIVE HEART WITHOUT ANGINA PECTORIS: ICD-10-CM

## 2019-08-06 PROCEDURE — G8427 DOCREV CUR MEDS BY ELIG CLIN: HCPCS | Performed by: INTERNAL MEDICINE

## 2019-08-06 PROCEDURE — 1036F TOBACCO NON-USER: CPT | Performed by: INTERNAL MEDICINE

## 2019-08-06 PROCEDURE — 4040F PNEUMOC VAC/ADMIN/RCVD: CPT | Performed by: INTERNAL MEDICINE

## 2019-08-06 PROCEDURE — 3017F COLORECTAL CA SCREEN DOC REV: CPT | Performed by: INTERNAL MEDICINE

## 2019-08-06 PROCEDURE — 93000 ELECTROCARDIOGRAM COMPLETE: CPT | Performed by: INTERNAL MEDICINE

## 2019-08-06 PROCEDURE — G8419 CALC BMI OUT NRM PARAM NOF/U: HCPCS | Performed by: INTERNAL MEDICINE

## 2019-08-06 PROCEDURE — G8598 ASA/ANTIPLAT THER USED: HCPCS | Performed by: INTERNAL MEDICINE

## 2019-08-06 PROCEDURE — 99214 OFFICE O/P EST MOD 30 MIN: CPT | Performed by: INTERNAL MEDICINE

## 2019-08-06 PROCEDURE — 1123F ACP DISCUSS/DSCN MKR DOCD: CPT | Performed by: INTERNAL MEDICINE

## 2019-08-06 RX ORDER — HYDROCODONE BITARTRATE AND ACETAMINOPHEN 7.5; 325 MG/1; MG/1
TABLET ORAL
COMMUNITY
Start: 2017-11-01 | End: 2021-06-29

## 2019-08-06 RX ORDER — LISINOPRIL 20 MG/1
20 TABLET ORAL 2 TIMES DAILY
Qty: 180 TABLET | Refills: 1 | Status: SHIPPED | OUTPATIENT
Start: 2019-08-06 | End: 2020-05-14

## 2019-09-30 RX ORDER — CLOPIDOGREL BISULFATE 75 MG/1
TABLET ORAL
Qty: 90 TABLET | Refills: 3 | Status: SHIPPED | OUTPATIENT
Start: 2019-09-30 | End: 2020-10-23

## 2019-09-30 RX ORDER — ATORVASTATIN CALCIUM 80 MG/1
TABLET, FILM COATED ORAL
Qty: 90 TABLET | Refills: 3 | Status: SHIPPED | OUTPATIENT
Start: 2019-09-30 | End: 2020-05-15 | Stop reason: DRUGHIGH

## 2019-11-08 ENCOUNTER — HOSPITAL ENCOUNTER (OUTPATIENT)
Dept: LAB | Age: 74
Discharge: HOME | End: 2019-11-08
Payer: MEDICARE

## 2019-11-08 DIAGNOSIS — I10 ESSENTIAL HYPERTENSION: ICD-10-CM

## 2019-11-08 DIAGNOSIS — E11.9 TYPE 2 DIABETES MELLITUS WITHOUT COMPLICATION, WITHOUT LONG-TERM CURRENT USE OF INSULIN (HCC): ICD-10-CM

## 2019-11-08 DIAGNOSIS — E78.5 HYPERLIPIDEMIA, UNSPECIFIED HYPERLIPIDEMIA TYPE: ICD-10-CM

## 2019-11-08 DIAGNOSIS — C61 PROSTATE CANCER (HCC): ICD-10-CM

## 2019-11-08 LAB
ALBUMIN SERPL-MCNC: 4.4 G/DL (ref 3.5–5.2)
ALBUMIN/GLOBULIN RATIO: 1.7 (ref 1–2.5)
ALP BLD-CCNC: 142 U/L (ref 40–129)
ALT SERPL-CCNC: 14 U/L (ref 5–41)
ANION GAP SERPL CALCULATED.3IONS-SCNC: 15 MMOL/L (ref 9–17)
ANION GAP SERPL CALCULATED.3IONS-SCNC: 15 MMOL/L (ref 9–17)
AST SERPL-CCNC: 17 U/L
BILIRUB SERPL-MCNC: 0.71 MG/DL (ref 0.3–1.2)
BUN BLDV-MCNC: 18 MG/DL (ref 8–23)
BUN BLDV-MCNC: 18 MG/DL (ref 8–23)
BUN/CREAT BLD: 23 (ref 9–20)
BUN/CREAT BLD: 23 (ref 9–20)
CALCIUM SERPL-MCNC: 9.6 MG/DL (ref 8.6–10.4)
CALCIUM SERPL-MCNC: 9.6 MG/DL (ref 8.6–10.4)
CHLORIDE BLD-SCNC: 101 MMOL/L (ref 98–107)
CHLORIDE BLD-SCNC: 101 MMOL/L (ref 98–107)
CHOLESTEROL/HDL RATIO: 3.4
CHOLESTEROL: 121 MG/DL
CO2: 26 MMOL/L (ref 20–31)
CO2: 26 MMOL/L (ref 20–31)
CREAT SERPL-MCNC: 0.8 MG/DL (ref 0.7–1.2)
CREAT SERPL-MCNC: 0.8 MG/DL (ref 0.7–1.2)
CREATININE URINE: 236 MG/DL (ref 39–259)
ESTIMATED AVERAGE GLUCOSE: 123 MG/DL
GFR AFRICAN AMERICAN: >60 ML/MIN
GFR AFRICAN AMERICAN: >60 ML/MIN
GFR NON-AFRICAN AMERICAN: >60 ML/MIN
GFR NON-AFRICAN AMERICAN: >60 ML/MIN
GFR SERPL CREATININE-BSD FRML MDRD: ABNORMAL ML/MIN/{1.73_M2}
GLUCOSE BLD-MCNC: 128 MG/DL (ref 70–99)
GLUCOSE BLD-MCNC: 128 MG/DL (ref 70–99)
HBA1C MFR BLD: 5.9 % (ref 4.8–5.9)
HDLC SERPL-MCNC: 36 MG/DL
LDL CHOLESTEROL: 56 MG/DL (ref 0–130)
MICROALBUMIN/CREAT 24H UR: 16 MG/L
MICROALBUMIN/CREAT UR-RTO: 7 MCG/MG CREAT
POTASSIUM SERPL-SCNC: 3.7 MMOL/L (ref 3.7–5.3)
POTASSIUM SERPL-SCNC: 3.7 MMOL/L (ref 3.7–5.3)
PROSTATE SPECIFIC ANTIGEN: <0.01 UG/L
SODIUM BLD-SCNC: 142 MMOL/L (ref 135–144)
SODIUM BLD-SCNC: 142 MMOL/L (ref 135–144)
TOTAL PROTEIN: 7 G/DL (ref 6.4–8.3)
TRIGL SERPL-MCNC: 143 MG/DL
VLDLC SERPL CALC-MCNC: ABNORMAL MG/DL (ref 1–30)

## 2019-11-08 PROCEDURE — 82570 ASSAY OF URINE CREATININE: CPT

## 2019-11-08 PROCEDURE — 80061 LIPID PANEL: CPT

## 2019-11-08 PROCEDURE — 82043 UR ALBUMIN QUANTITATIVE: CPT

## 2019-11-08 PROCEDURE — 36415 COLL VENOUS BLD VENIPUNCTURE: CPT

## 2019-11-08 PROCEDURE — 80048 BASIC METABOLIC PNL TOTAL CA: CPT

## 2019-11-08 PROCEDURE — 83036 HEMOGLOBIN GLYCOSYLATED A1C: CPT

## 2019-11-08 PROCEDURE — 80053 COMPREHEN METABOLIC PANEL: CPT

## 2019-11-08 PROCEDURE — 84153 ASSAY OF PSA TOTAL: CPT

## 2019-11-12 ENCOUNTER — OFFICE VISIT (OUTPATIENT)
Dept: INTERNAL MEDICINE | Age: 74
End: 2019-11-12
Payer: MEDICARE

## 2019-11-12 VITALS
WEIGHT: 158 LBS | RESPIRATION RATE: 16 BRPM | BODY MASS INDEX: 25.39 KG/M2 | HEIGHT: 66 IN | SYSTOLIC BLOOD PRESSURE: 120 MMHG | HEART RATE: 68 BPM | DIASTOLIC BLOOD PRESSURE: 76 MMHG

## 2019-11-12 DIAGNOSIS — Z00.00 ROUTINE GENERAL MEDICAL EXAMINATION AT A HEALTH CARE FACILITY: Primary | ICD-10-CM

## 2019-11-12 DIAGNOSIS — E11.9 TYPE 2 DIABETES MELLITUS WITHOUT COMPLICATION, WITHOUT LONG-TERM CURRENT USE OF INSULIN (HCC): ICD-10-CM

## 2019-11-12 DIAGNOSIS — E78.5 HYPERLIPIDEMIA, UNSPECIFIED HYPERLIPIDEMIA TYPE: ICD-10-CM

## 2019-11-12 DIAGNOSIS — Z00.00 MEDICARE ANNUAL WELLNESS VISIT, SUBSEQUENT: ICD-10-CM

## 2019-11-12 DIAGNOSIS — I10 ESSENTIAL HYPERTENSION: ICD-10-CM

## 2019-11-12 PROCEDURE — 3044F HG A1C LEVEL LT 7.0%: CPT | Performed by: INTERNAL MEDICINE

## 2019-11-12 PROCEDURE — G8482 FLU IMMUNIZE ORDER/ADMIN: HCPCS | Performed by: INTERNAL MEDICINE

## 2019-11-12 PROCEDURE — 4040F PNEUMOC VAC/ADMIN/RCVD: CPT | Performed by: INTERNAL MEDICINE

## 2019-11-12 PROCEDURE — 2022F DILAT RTA XM EVC RTNOPTHY: CPT | Performed by: INTERNAL MEDICINE

## 2019-11-12 PROCEDURE — 3017F COLORECTAL CA SCREEN DOC REV: CPT | Performed by: INTERNAL MEDICINE

## 2019-11-12 PROCEDURE — G8598 ASA/ANTIPLAT THER USED: HCPCS | Performed by: INTERNAL MEDICINE

## 2019-11-12 PROCEDURE — 99214 OFFICE O/P EST MOD 30 MIN: CPT | Performed by: INTERNAL MEDICINE

## 2019-11-12 PROCEDURE — 1036F TOBACCO NON-USER: CPT | Performed by: INTERNAL MEDICINE

## 2019-11-12 PROCEDURE — 1123F ACP DISCUSS/DSCN MKR DOCD: CPT | Performed by: INTERNAL MEDICINE

## 2019-11-12 PROCEDURE — G8427 DOCREV CUR MEDS BY ELIG CLIN: HCPCS | Performed by: INTERNAL MEDICINE

## 2019-11-12 PROCEDURE — G8417 CALC BMI ABV UP PARAM F/U: HCPCS | Performed by: INTERNAL MEDICINE

## 2019-11-12 PROCEDURE — G0439 PPPS, SUBSEQ VISIT: HCPCS | Performed by: INTERNAL MEDICINE

## 2019-11-12 ASSESSMENT — ENCOUNTER SYMPTOMS
VOMITING: 0
CONSTIPATION: 0
COUGH: 0
BLOOD IN STOOL: 0
NAUSEA: 0
SHORTNESS OF BREATH: 0
EYE PAIN: 0
DIARRHEA: 0
ABDOMINAL PAIN: 0
BACK PAIN: 0

## 2019-11-12 ASSESSMENT — LIFESTYLE VARIABLES: HOW OFTEN DO YOU HAVE A DRINK CONTAINING ALCOHOL: 0

## 2019-11-12 ASSESSMENT — PATIENT HEALTH QUESTIONNAIRE - PHQ9
SUM OF ALL RESPONSES TO PHQ QUESTIONS 1-9: 1
SUM OF ALL RESPONSES TO PHQ QUESTIONS 1-9: 1

## 2019-11-25 ENCOUNTER — OFFICE VISIT (OUTPATIENT)
Dept: PRIMARY CARE CLINIC | Age: 74
End: 2019-11-25
Payer: MEDICARE

## 2019-11-25 VITALS
RESPIRATION RATE: 18 BRPM | HEART RATE: 58 BPM | OXYGEN SATURATION: 94 % | SYSTOLIC BLOOD PRESSURE: 130 MMHG | TEMPERATURE: 97.9 F | WEIGHT: 153 LBS | DIASTOLIC BLOOD PRESSURE: 60 MMHG | HEIGHT: 66 IN | BODY MASS INDEX: 24.59 KG/M2

## 2019-11-25 DIAGNOSIS — S61.112A LACERATION OF LEFT THUMB WITHOUT FOREIGN BODY WITH DAMAGE TO NAIL, INITIAL ENCOUNTER: Primary | ICD-10-CM

## 2019-11-25 PROCEDURE — 90715 TDAP VACCINE 7 YRS/> IM: CPT | Performed by: NURSE PRACTITIONER

## 2019-11-25 PROCEDURE — 1123F ACP DISCUSS/DSCN MKR DOCD: CPT | Performed by: NURSE PRACTITIONER

## 2019-11-25 PROCEDURE — 99213 OFFICE O/P EST LOW 20 MIN: CPT | Performed by: NURSE PRACTITIONER

## 2019-11-25 PROCEDURE — 3017F COLORECTAL CA SCREEN DOC REV: CPT | Performed by: NURSE PRACTITIONER

## 2019-11-25 PROCEDURE — 1036F TOBACCO NON-USER: CPT | Performed by: NURSE PRACTITIONER

## 2019-11-25 PROCEDURE — G8482 FLU IMMUNIZE ORDER/ADMIN: HCPCS | Performed by: NURSE PRACTITIONER

## 2019-11-25 PROCEDURE — 90471 IMMUNIZATION ADMIN: CPT | Performed by: NURSE PRACTITIONER

## 2019-11-25 PROCEDURE — 4040F PNEUMOC VAC/ADMIN/RCVD: CPT | Performed by: NURSE PRACTITIONER

## 2019-11-25 PROCEDURE — G8598 ASA/ANTIPLAT THER USED: HCPCS | Performed by: NURSE PRACTITIONER

## 2019-11-25 PROCEDURE — G8427 DOCREV CUR MEDS BY ELIG CLIN: HCPCS | Performed by: NURSE PRACTITIONER

## 2019-11-25 PROCEDURE — 12001 RPR S/N/AX/GEN/TRNK 2.5CM/<: CPT | Performed by: NURSE PRACTITIONER

## 2019-11-25 PROCEDURE — G8420 CALC BMI NORM PARAMETERS: HCPCS | Performed by: NURSE PRACTITIONER

## 2019-11-25 ASSESSMENT — ENCOUNTER SYMPTOMS: RESPIRATORY NEGATIVE: 1

## 2019-12-12 ENCOUNTER — OFFICE VISIT (OUTPATIENT)
Dept: PRIMARY CARE CLINIC | Age: 74
End: 2019-12-12

## 2019-12-12 VITALS
HEIGHT: 66 IN | RESPIRATION RATE: 16 BRPM | BODY MASS INDEX: 24.11 KG/M2 | TEMPERATURE: 97.9 F | DIASTOLIC BLOOD PRESSURE: 80 MMHG | HEART RATE: 60 BPM | WEIGHT: 150 LBS | SYSTOLIC BLOOD PRESSURE: 136 MMHG | OXYGEN SATURATION: 97 %

## 2019-12-12 DIAGNOSIS — Z48.02 VISIT FOR SUTURE REMOVAL: Primary | ICD-10-CM

## 2019-12-12 PROCEDURE — 99024 POSTOP FOLLOW-UP VISIT: CPT | Performed by: NURSE PRACTITIONER

## 2020-01-28 ENCOUNTER — OFFICE VISIT (OUTPATIENT)
Dept: CARDIOLOGY | Age: 75
End: 2020-01-28
Payer: MEDICARE

## 2020-01-28 VITALS
BODY MASS INDEX: 25.07 KG/M2 | WEIGHT: 156 LBS | SYSTOLIC BLOOD PRESSURE: 145 MMHG | HEIGHT: 66 IN | HEART RATE: 70 BPM | DIASTOLIC BLOOD PRESSURE: 78 MMHG

## 2020-01-28 PROCEDURE — 1036F TOBACCO NON-USER: CPT | Performed by: INTERNAL MEDICINE

## 2020-01-28 PROCEDURE — G8482 FLU IMMUNIZE ORDER/ADMIN: HCPCS | Performed by: INTERNAL MEDICINE

## 2020-01-28 PROCEDURE — 99213 OFFICE O/P EST LOW 20 MIN: CPT

## 2020-01-28 PROCEDURE — G8427 DOCREV CUR MEDS BY ELIG CLIN: HCPCS | Performed by: INTERNAL MEDICINE

## 2020-01-28 PROCEDURE — G8417 CALC BMI ABV UP PARAM F/U: HCPCS | Performed by: INTERNAL MEDICINE

## 2020-01-28 PROCEDURE — 3017F COLORECTAL CA SCREEN DOC REV: CPT | Performed by: INTERNAL MEDICINE

## 2020-01-28 PROCEDURE — 1123F ACP DISCUSS/DSCN MKR DOCD: CPT | Performed by: INTERNAL MEDICINE

## 2020-01-28 PROCEDURE — 99214 OFFICE O/P EST MOD 30 MIN: CPT | Performed by: INTERNAL MEDICINE

## 2020-01-28 PROCEDURE — 4040F PNEUMOC VAC/ADMIN/RCVD: CPT | Performed by: INTERNAL MEDICINE

## 2020-01-28 NOTE — PROGRESS NOTES
mouth once daily 7/8/19  Yes Kenji Lima MD   ondansetron (ZOFRAN) 4 MG tablet Take 1 tablet by mouth every 8 hours as needed for Nausea or Vomiting 5/10/19  Yes Kenji Lima MD   fexofenadine (ALLEGRA) 180 MG tablet Take 180 mg by mouth daily   Yes Historical Provider, MD   fluticasone-vilanterol (BREO ELLIPTA) 100-25 MCG/INH AEPB inhaler Inhale 1 puff into the lungs daily   Yes Historical Provider, MD   triamcinolone (KENALOG) 0.1 % cream Apply topically 2 times daily. 6/4/18  Yes Kenji Lima MD   metoprolol tartrate (LOPRESSOR) 50 MG tablet take 1 tablet by mouth twice a day 3/19/18  Yes Kenji Lima MD   hydrochlorothiazide (HYDRODIURIL) 25 MG tablet Take 25 mg by mouth daily   Yes Historical Provider, MD   albuterol sulfate  (90 Base) MCG/ACT inhaler Inhale 2 puffs into the lungs every 6 hours as needed for Wheezing 1/19/18  Yes Kenji Lima MD   nitroGLYCERIN (NITROSTAT) 0.4 MG SL tablet Place 1 tablet under the tongue every 5 minutes as needed for Chest pain 7/25/17  Yes Rordi Ware,    lidocaine (LIDODERM) 5 % Place 1 patch onto the skin daily Use 12-18 hours 8/25/15  Yes Historical Provider, MD   cyclobenzaprine (FLEXERIL) 10 MG tablet Take 10 mg by mouth 3 times daily as needed for Muscle spasms   Yes Historical Provider, MD   aspirin 81 MG tablet Take 81 mg by mouth daily   Yes Historical Provider, MD       Allergies:  Zonisamide    Social History:   reports that he quit smoking about 32 years ago. He has a 10.00 pack-year smoking history. He has never used smokeless tobacco. He reports that he does not drink alcohol or use drugs. Review of Systems:  · Constitutional: there has been no unanticipated weight loss. There's been No change in energy level, No change in activity level. · Eyes: No visual changes or diplopia. No scleral icterus. · ENT: No Headaches, hearing loss or vertigo. No mouth sores or sore throat. · Cardiovascular: As above.   · Respiratory: No SOB, cough or hemoptysis. · Gastrointestinal: No abdominal pain, appetite loss, blood in stools. No change in bowel or bladder habits. · Genitourinary: No dysuria, trouble voiding, or hematuria. · Musculoskeletal:  No gait disturbance, No weakness or joint complaints. · Integumentary: No rash or pruritis. · Psychiatric: No anxiety, or depression. · Hematologic/Lymphatic: No abnormal bruising or bleeding, blood clots or swollen lymph nodes. · Allergic/Immunologic: No nasal congestion or hives. Physical Exam:  BP (!) 145/78   Pulse 70   Ht 5' 6\" (1.676 m)   Wt 156 lb (70.8 kg)   BMI 25.18 kg/m²     Constitutional and General Appearance: alert, cooperative, no distress and appears stated age  [de-identified]: PERRL, no cervical lymphadenopathy. No masses palpable. Normal oral mucosa  Respiratory:  · Normal excursion and expansion without use of accessory muscles  · Resp Auscultation: Good respiratory effort. No for increased work of breathing. On auscultation: clear to auscultation bilaterally  Cardiovascular:  · The apical impulse is not displaced  · Heart tones are crisp and normal. regular S1 and S2.  · Jugular venous pulsation Normal  · The carotid upstroke is normal in amplitude and contour without delay or bruit  · Peripheral pulses are symmetrical and full   Abdomen:   · No masses or tenderness  · Bowel sounds present  Extremities:  ·  No Cyanosis or Clubbing  ·  Lower extremity edema: No  ·  Skin: Warm and dry    Cardiac Data:  EKG:     Labs:     CBC: No results for input(s): WBC, HGB, HCT, PLT in the last 72 hours. BMP: No results for input(s): NA, K, CO2, BUN, CREATININE, LABGLOM, GLUCOSE in the last 72 hours. PT/INR: No results for input(s): PROTIME, INR in the last 72 hours.   FASTING LIPID PANEL:  Lab Results   Component Value Date    CHOL 121 11/08/2019    HDL 36 11/08/2019    LDLCHOLESTEROL 56 11/08/2019    TRIG 143 11/08/2019    CHOLHDLRATIO 3.4 11/08/2019     LIVER PROFILE:No results for

## 2020-02-13 RX ORDER — ONDANSETRON 4 MG/1
TABLET, FILM COATED ORAL
Qty: 30 TABLET | Refills: 0 | Status: SHIPPED | OUTPATIENT
Start: 2020-02-13 | End: 2020-05-15 | Stop reason: SDUPTHER

## 2020-03-03 ENCOUNTER — OFFICE VISIT (OUTPATIENT)
Dept: PRIMARY CARE CLINIC | Age: 75
End: 2020-03-03
Payer: MEDICARE

## 2020-03-03 ENCOUNTER — HOSPITAL ENCOUNTER (OUTPATIENT)
Dept: CT IMAGING | Age: 75
Discharge: HOME | End: 2020-03-05
Payer: MEDICARE

## 2020-03-03 ENCOUNTER — HOSPITAL ENCOUNTER (OUTPATIENT)
Age: 75
Setting detail: SPECIMEN
Discharge: HOME | End: 2020-03-03
Payer: MEDICARE

## 2020-03-03 ENCOUNTER — HOSPITAL ENCOUNTER (OUTPATIENT)
Age: 75
Discharge: HOME | End: 2020-03-05
Payer: MEDICARE

## 2020-03-03 VITALS
RESPIRATION RATE: 16 BRPM | HEIGHT: 66 IN | TEMPERATURE: 97.9 F | HEART RATE: 62 BPM | WEIGHT: 147.4 LBS | OXYGEN SATURATION: 98 % | BODY MASS INDEX: 23.69 KG/M2

## 2020-03-03 LAB
ABSOLUTE EOS #: 0.35 K/UL (ref 0–0.44)
ABSOLUTE IMMATURE GRANULOCYTE: <0.03 K/UL (ref 0–0.3)
ABSOLUTE LYMPH #: 0.85 K/UL (ref 1.1–3.7)
ABSOLUTE MONO #: 0.49 K/UL (ref 0.1–1.2)
ALBUMIN SERPL-MCNC: 4.4 G/DL (ref 3.5–5.2)
ALBUMIN/GLOBULIN RATIO: 1.9 (ref 1–2.5)
ALP BLD-CCNC: 158 U/L (ref 40–129)
ALT SERPL-CCNC: 18 U/L (ref 5–41)
AMYLASE: 108 U/L (ref 28–100)
ANION GAP SERPL CALCULATED.3IONS-SCNC: 12 MMOL/L (ref 9–17)
AST SERPL-CCNC: 16 U/L
BASOPHILS # BLD: 1 % (ref 0–2)
BASOPHILS ABSOLUTE: 0.05 K/UL (ref 0–0.2)
BILIRUB SERPL-MCNC: 0.52 MG/DL (ref 0.3–1.2)
BUN BLDV-MCNC: 16 MG/DL (ref 8–23)
BUN/CREAT BLD: 17 (ref 9–20)
CALCIUM SERPL-MCNC: 9.5 MG/DL (ref 8.6–10.4)
CHLORIDE BLD-SCNC: 100 MMOL/L (ref 98–107)
CO2: 29 MMOL/L (ref 20–31)
CREAT SERPL-MCNC: 0.93 MG/DL (ref 0.7–1.2)
DIFFERENTIAL TYPE: ABNORMAL
EOSINOPHILS RELATIVE PERCENT: 6 % (ref 1–4)
GFR AFRICAN AMERICAN: >60 ML/MIN
GFR NON-AFRICAN AMERICAN: >60 ML/MIN
GFR SERPL CREATININE-BSD FRML MDRD: ABNORMAL ML/MIN/{1.73_M2}
GFR SERPL CREATININE-BSD FRML MDRD: ABNORMAL ML/MIN/{1.73_M2}
GLUCOSE BLD-MCNC: 94 MG/DL (ref 70–99)
HCT VFR BLD CALC: 40.9 % (ref 40.7–50.3)
HEMOGLOBIN: 14.2 G/DL (ref 13–17)
IMMATURE GRANULOCYTES: 0 %
LIPASE: 11 U/L (ref 13–60)
LYMPHOCYTES # BLD: 15 % (ref 24–43)
MCH RBC QN AUTO: 30.6 PG (ref 25.2–33.5)
MCHC RBC AUTO-ENTMCNC: 34.7 G/DL (ref 25.2–33.5)
MCV RBC AUTO: 88.1 FL (ref 82.6–102.9)
MONOCYTES # BLD: 8 % (ref 3–12)
NRBC AUTOMATED: 0 PER 100 WBC
PDW BLD-RTO: 12.3 % (ref 11.8–14.4)
PLATELET # BLD: 294 K/UL (ref 138–453)
PLATELET ESTIMATE: ABNORMAL
PMV BLD AUTO: 9.1 FL (ref 8.1–13.5)
POTASSIUM SERPL-SCNC: 4.8 MMOL/L (ref 3.7–5.3)
RBC # BLD: 4.64 M/UL (ref 4.21–5.77)
RBC # BLD: ABNORMAL 10*6/UL
SEG NEUTROPHILS: 70 % (ref 36–65)
SEGMENTED NEUTROPHILS ABSOLUTE COUNT: 4.1 K/UL (ref 1.5–8.1)
SODIUM BLD-SCNC: 141 MMOL/L (ref 135–144)
TOTAL PROTEIN: 6.7 G/DL (ref 6.4–8.3)
WBC # BLD: 5.9 K/UL (ref 3.5–11.3)
WBC # BLD: ABNORMAL 10*3/UL

## 2020-03-03 PROCEDURE — 1036F TOBACCO NON-USER: CPT | Performed by: FAMILY MEDICINE

## 2020-03-03 PROCEDURE — G8420 CALC BMI NORM PARAMETERS: HCPCS | Performed by: FAMILY MEDICINE

## 2020-03-03 PROCEDURE — 6360000004 HC RX CONTRAST MEDICATION: Performed by: FAMILY MEDICINE

## 2020-03-03 PROCEDURE — 3017F COLORECTAL CA SCREEN DOC REV: CPT | Performed by: FAMILY MEDICINE

## 2020-03-03 PROCEDURE — G8427 DOCREV CUR MEDS BY ELIG CLIN: HCPCS | Performed by: FAMILY MEDICINE

## 2020-03-03 PROCEDURE — 4040F PNEUMOC VAC/ADMIN/RCVD: CPT | Performed by: FAMILY MEDICINE

## 2020-03-03 PROCEDURE — 1123F ACP DISCUSS/DSCN MKR DOCD: CPT | Performed by: FAMILY MEDICINE

## 2020-03-03 PROCEDURE — 80053 COMPREHEN METABOLIC PANEL: CPT

## 2020-03-03 PROCEDURE — 85025 COMPLETE CBC W/AUTO DIFF WBC: CPT

## 2020-03-03 PROCEDURE — G8482 FLU IMMUNIZE ORDER/ADMIN: HCPCS | Performed by: FAMILY MEDICINE

## 2020-03-03 PROCEDURE — 74177 CT ABD & PELVIS W/CONTRAST: CPT

## 2020-03-03 PROCEDURE — 99214 OFFICE O/P EST MOD 30 MIN: CPT | Performed by: FAMILY MEDICINE

## 2020-03-03 PROCEDURE — 36415 COLL VENOUS BLD VENIPUNCTURE: CPT

## 2020-03-03 PROCEDURE — 82150 ASSAY OF AMYLASE: CPT

## 2020-03-03 PROCEDURE — 83690 ASSAY OF LIPASE: CPT

## 2020-03-03 PROCEDURE — 99213 OFFICE O/P EST LOW 20 MIN: CPT | Performed by: FAMILY MEDICINE

## 2020-03-03 RX ORDER — LEVOFLOXACIN 500 MG/1
500 TABLET, FILM COATED ORAL DAILY
Qty: 10 TABLET | Refills: 0 | Status: SHIPPED | OUTPATIENT
Start: 2020-03-03 | End: 2020-03-13

## 2020-03-03 RX ADMIN — IOPAMIDOL 100 ML: 755 INJECTION, SOLUTION INTRAVENOUS at 20:36

## 2020-03-03 ASSESSMENT — PATIENT HEALTH QUESTIONNAIRE - PHQ9
SUM OF ALL RESPONSES TO PHQ QUESTIONS 1-9: 0
2. FEELING DOWN, DEPRESSED OR HOPELESS: 0
SUM OF ALL RESPONSES TO PHQ QUESTIONS 1-9: 0
SUM OF ALL RESPONSES TO PHQ9 QUESTIONS 1 & 2: 0
1. LITTLE INTEREST OR PLEASURE IN DOING THINGS: 0

## 2020-03-03 NOTE — PROGRESS NOTES
[facility]             [address] [facility], [place], [address]                        Telephone [phone]             Fax [phone]       Segun Darden  1945  MRN:  [id number]  Date of visit:  3/3/2020     Subjective:    Segun Darden is a 76 y.o.  male who presents to [facility] today (3/3/2020) for evaluation of:  Bloated (Chronic \"bloating\" but worse now causing gen. ABD pain causing N/V/D several episodes over the last 2 mo.      )      He states that he has had abdominal bloating for about 2 months. He reports occasional nausea and vomiting. He states that his appetite is decreased. He denies fever or chills. He reports gas and bloating. He states that he takes Norco for chronic back pain. He states that he has constipation due to use of Norco.  He reports an episode of diarrhea last week.       He has had the following surgeries:  Past Surgical History:   Procedure Laterality Date    CARDIAC SURGERY      COLONOSCOPY  03/15/2002    Dr. Nai Wong      july 2015 drug-eluting stents placed 2 to the RCA and one to the left circumflex    OTHER SURGICAL HISTORY  08/25/2009    bilateral L3-4, L4-5, L5-S1 steroid facet injection     MO COLON CA SCRN NOT HI RSK IND N/A 2/27/2017    COLONOSCOPY performed by Deidre Condon MD at [facility] OR  diverticulosis    PROSTATECTOMY  02/09/2010    Dr. Masha Mccracken Left 04/23/2013    TOTAL KNEE ARTHROPLASTY Right 02/06/2018    [place]    UPPER GASTROINTESTINAL ENDOSCOPY  03/15/2002    Dr. Isma Nuno       He has the following problem list:  Patient Active Problem List   Diagnosis    Hypertension    Prostate cancer ([address]    Hyperlipidemia    Troponin level elevated    Enteritis    CAD (coronary artery disease) SP drug eluting stent 2 stents to RCA and 1 to L circ     [facility]non-ST elevated [facility] infarction) [facility]    [facility]    History of colon polyps    Chronic midline low back pain with bilateral sciatica        Current medications are:     Current Outpatient Medications   Medication Sig Dispense Refill    ondansetron (ZOFRAN) 4 MG tablet take 1 tablet by mouth every 8 hours if needed for nausea and vomiting 30 tablet 0    clopidogrel (PLAVIX) 75 MG tablet take 1 tablet by mouth once daily 90 tablet 3    atorvastatin (LIPITOR) 80 MG tablet take 1 tablet by mouth once daily 90 tablet 3    HYDROcodone-acetaminophen (NORCO) 7.5-325 MG per tablet take 1 tablet by mouth three times a day if needed for pain ( MAX 3 TABS IN 24 HOURS )      lisinopril (PRINIVIL;ZESTRIL) 20 MG tablet Take 1 tablet by mouth 2 times daily 180 tablet 1    pantoprazole (PROTONIX) 40 MG tablet take 1 tablet by mouth once daily 90 tablet 3    fexofenadine (ALLEGRA) 180 MG tablet Take 180 mg by mouth daily      triamcinolone (KENALOG) 0.1 % cream Apply topically 2 times daily. 1 Tube 3    metoprolol tartrate (LOPRESSOR) 50 MG tablet take 1 tablet by mouth twice a day 180 tablet 3    hydrochlorothiazide (HYDRODIURIL) 25 MG tablet Take 25 mg by mouth daily      albuterol sulfate  (90 Base) MCG/ACT inhaler Inhale 2 puffs into the lungs every 6 hours as needed for Wheezing 1 Inhaler 5    nitroGLYCERIN (NITROSTAT) 0.4 MG SL tablet Place 1 tablet under the tongue every 5 minutes as needed for Chest pain 25 tablet 3    lidocaine (LIDODERM) 5 % Place 1 patch onto the skin daily Use 12-18 hours  0    cyclobenzaprine (FLEXERIL) 10 MG tablet Take 10 mg by mouth 3 times daily as needed for Muscle spasms      aspirin 81 MG tablet Take 81 mg by mouth daily      fluticasone-vilanterol (BREO ELLIPTA) 100-25 MCG/INH AEPB inhaler Inhale 1 puff into the lungs daily       No current facility-administered medications for this visit. 03/03/2020 1  0 - 2 % Final    Immature Granulocytes 03/03/2020 0  0 % Final    Segs Absolute 03/03/2020 4.10  1.50 - 8.10 k/uL Final    Absolute Lymph # 03/03/2020 0.85* 1.10 - 3.70 k/uL Final    Absolute Mono # 03/03/2020 0.49  0.10 - 1.20 k/uL Final    Absolute Eos # 03/03/2020 0.35  0.00 - 0.44 k/uL Final    Basophils Absolute 03/03/2020 0.05  0.00 - 0.20 k/uL Final    Absolute Immature Granulocyte 03/03/2020 <0.03  0.00 - 0.30 k/uL Final    Amylase 03/03/2020 108* 28 - 100 U/L Final    Lipase 03/03/2020 11* 13 - 60 U/L Final    Glucose 03/03/2020 94  70 - 99 mg/dL Final    BUN 03/03/2020 16  8 - 23 mg/dL Final    CREATININE 03/03/2020 0.93  0.70 - 1.20 mg/dL Final    Bun/Cre Ratio 03/03/2020 17  9 - 20 Final    Calcium 03/03/2020 9.5  8.6 - 10.4 mg/dL Final    Sodium 03/03/2020 141  135 - 144 mmol/L Final    Potassium 03/03/2020 4.8  3.7 - 5.3 mmol/L Final    Chloride 03/03/2020 100  98 - 107 mmol/L Final    CO2 03/03/2020 29  20 - 31 mmol/L Final    Anion Gap 03/03/2020 12  9 - 17 mmol/L Final    Alkaline Phosphatase 03/03/2020 158* 40 - 129 U/L Final    ALT 03/03/2020 18  5 - 41 U/L Final    AST 03/03/2020 16  <40 U/L Final    Total Bilirubin 03/03/2020 0.52  0.3 - 1.2 mg/dL Final    Total Protein 03/03/2020 6.7  6.4 - 8.3 g/dL Final    Alb 03/03/2020 4.4  3.5 - 5.2 g/dL Final    Albumin/Globulin Ratio 03/03/2020 1.9  1.0 - 2.5 Final    GFR Non- 03/03/2020 >60  >60 mL/min Final    GFR  03/03/2020 >60  >60 mL/min Final    GFR Comment 03/03/2020        Final    Comment: Average GFR for 79or more years old:   76 mL/min/1.73sq m  Chronic Kidney Disease:   <60 mL/min/1.73sq m  Kidney failure:   <15 mL/min/1.73sq m              eGFR calculated using average adult body mass.  Additional eGFR calculator available at:        [employer]            GFR Staging 03/03/2020 NOT REPORTED   Final      Ct Abdomen Pelvis W Iv

## 2020-03-04 NOTE — PATIENT INSTRUCTIONS
Call tomorrow to schedule a appointment with Dr. Vu Johnson in the next 1-2 weeks for follow up. Align is one brand of probiotic. This may help with some of your symptoms.

## 2020-03-04 NOTE — PROGRESS NOTES
2008  P/W/D, easy RR, NAD Noted. Pt +tom IV placement (20G x1 attempt) to left AC, +flash/flush, CDD to AA. Pt escorted via W/C to rad. For CT  & staff aware Pt waiting in [facility]. NNO at this time. 2105  P/W/D, easy RR, NAD Noted,  Pt +tom LUE IV removal, cath. +intake, CDD to AA.

## 2020-03-23 ENCOUNTER — TELEPHONE (OUTPATIENT)
Dept: INTERNAL MEDICINE | Age: 75
End: 2020-03-23

## 2020-04-01 RX ORDER — PANTOPRAZOLE SODIUM 40 MG/1
TABLET, DELAYED RELEASE ORAL
Qty: 90 TABLET | Refills: 3 | Status: SHIPPED | OUTPATIENT
Start: 2020-04-01 | End: 2021-06-14

## 2020-05-06 ENCOUNTER — HOSPITAL ENCOUNTER (OUTPATIENT)
Dept: LAB | Age: 75
Discharge: HOME | End: 2020-05-06
Payer: MEDICARE

## 2020-05-06 LAB
ESTIMATED AVERAGE GLUCOSE: 120 MG/DL
HBA1C MFR BLD: 5.8 % (ref 4.8–5.9)

## 2020-05-06 PROCEDURE — 83036 HEMOGLOBIN GLYCOSYLATED A1C: CPT

## 2020-05-06 PROCEDURE — 36415 COLL VENOUS BLD VENIPUNCTURE: CPT

## 2020-05-14 RX ORDER — LISINOPRIL 20 MG/1
TABLET ORAL
Qty: 90 TABLET | Refills: 3 | Status: SHIPPED | OUTPATIENT
Start: 2020-05-14 | End: 2021-02-25

## 2020-05-15 ENCOUNTER — HOSPITAL ENCOUNTER (OUTPATIENT)
Dept: GENERAL RADIOLOGY | Age: 75
Discharge: HOME | End: 2020-05-17
Payer: MEDICARE

## 2020-05-15 ENCOUNTER — OFFICE VISIT (OUTPATIENT)
Dept: INTERNAL MEDICINE | Age: 75
End: 2020-05-15
Payer: MEDICARE

## 2020-05-15 VITALS
SYSTOLIC BLOOD PRESSURE: 138 MMHG | BODY MASS INDEX: 23.63 KG/M2 | RESPIRATION RATE: 16 BRPM | WEIGHT: 147 LBS | HEART RATE: 64 BPM | HEIGHT: 66 IN | DIASTOLIC BLOOD PRESSURE: 86 MMHG

## 2020-05-15 PROCEDURE — 3017F COLORECTAL CA SCREEN DOC REV: CPT | Performed by: INTERNAL MEDICINE

## 2020-05-15 PROCEDURE — 71046 X-RAY EXAM CHEST 2 VIEWS: CPT

## 2020-05-15 PROCEDURE — 4040F PNEUMOC VAC/ADMIN/RCVD: CPT | Performed by: INTERNAL MEDICINE

## 2020-05-15 PROCEDURE — 99214 OFFICE O/P EST MOD 30 MIN: CPT | Performed by: INTERNAL MEDICINE

## 2020-05-15 PROCEDURE — 2022F DILAT RTA XM EVC RTNOPTHY: CPT | Performed by: INTERNAL MEDICINE

## 2020-05-15 PROCEDURE — 3044F HG A1C LEVEL LT 7.0%: CPT | Performed by: INTERNAL MEDICINE

## 2020-05-15 PROCEDURE — 1036F TOBACCO NON-USER: CPT | Performed by: INTERNAL MEDICINE

## 2020-05-15 PROCEDURE — 1123F ACP DISCUSS/DSCN MKR DOCD: CPT | Performed by: INTERNAL MEDICINE

## 2020-05-15 PROCEDURE — 3023F SPIROM DOC REV: CPT | Performed by: INTERNAL MEDICINE

## 2020-05-15 PROCEDURE — G8420 CALC BMI NORM PARAMETERS: HCPCS | Performed by: INTERNAL MEDICINE

## 2020-05-15 PROCEDURE — G8427 DOCREV CUR MEDS BY ELIG CLIN: HCPCS | Performed by: INTERNAL MEDICINE

## 2020-05-15 PROCEDURE — G8926 SPIRO NO PERF OR DOC: HCPCS | Performed by: INTERNAL MEDICINE

## 2020-05-15 PROCEDURE — 99214 OFFICE O/P EST MOD 30 MIN: CPT

## 2020-05-15 RX ORDER — ALBUTEROL SULFATE 90 UG/1
2 AEROSOL, METERED RESPIRATORY (INHALATION) EVERY 6 HOURS PRN
Qty: 1 INHALER | Refills: 5 | Status: SHIPPED | OUTPATIENT
Start: 2020-05-15 | End: 2020-07-28

## 2020-05-15 RX ORDER — ATORVASTATIN CALCIUM 40 MG/1
40 TABLET, FILM COATED ORAL DAILY
Qty: 90 TABLET | Refills: 3 | Status: SHIPPED | OUTPATIENT
Start: 2020-05-15 | End: 2021-05-24

## 2020-05-15 RX ORDER — ONDANSETRON 4 MG/1
TABLET, FILM COATED ORAL
Qty: 30 TABLET | Refills: 2 | Status: SHIPPED | OUTPATIENT
Start: 2020-05-15 | End: 2022-03-22 | Stop reason: SDUPTHER

## 2020-05-15 RX ORDER — ATORVASTATIN CALCIUM 40 MG/1
40 TABLET, FILM COATED ORAL DAILY
COMMUNITY
End: 2020-05-15 | Stop reason: SDUPTHER

## 2020-05-15 RX ORDER — NITROGLYCERIN 0.4 MG/1
0.4 TABLET SUBLINGUAL EVERY 5 MIN PRN
Qty: 25 TABLET | Refills: 3 | Status: SHIPPED | OUTPATIENT
Start: 2020-05-15

## 2020-05-15 RX ORDER — TRIAMCINOLONE ACETONIDE 1 MG/G
CREAM TOPICAL
Qty: 45 G | Refills: 3 | Status: SHIPPED | OUTPATIENT
Start: 2020-05-15

## 2020-05-15 ASSESSMENT — ENCOUNTER SYMPTOMS
CONSTIPATION: 0
BLOOD IN STOOL: 0
NAUSEA: 0
DIARRHEA: 0
BACK PAIN: 0
ABDOMINAL PAIN: 0
COUGH: 0
SHORTNESS OF BREATH: 0
EYE PAIN: 0
VOMITING: 0

## 2020-05-15 ASSESSMENT — PATIENT HEALTH QUESTIONNAIRE - PHQ9
2. FEELING DOWN, DEPRESSED OR HOPELESS: 0
1. LITTLE INTEREST OR PLEASURE IN DOING THINGS: 0
SUM OF ALL RESPONSES TO PHQ QUESTIONS 1-9: 0
SUM OF ALL RESPONSES TO PHQ9 QUESTIONS 1 & 2: 0
SUM OF ALL RESPONSES TO PHQ QUESTIONS 1-9: 0

## 2020-05-15 ASSESSMENT — COPD QUESTIONNAIRES: COPD: 1

## 2020-05-15 NOTE — PROGRESS NOTES
Specialty Services Required     Referred to Provider:   Donnie Jimenez MD     Requested Specialty:   Pulmonary Disease     Number of Visits Requested:   1     DIABETES FOOT EXAM     Orders Placed This Encounter   Medications    albuterol sulfate  (90 Base) MCG/ACT inhaler     Sig: Inhale 2 puffs into the lungs every 6 hours as needed for Wheezing     Dispense:  1 Inhaler     Refill:  5    ondansetron (ZOFRAN) 4 MG tablet     Sig: take 1 tablet by mouth every 8 hours if needed for nausea and vomiting     Dispense:  30 tablet     Refill:  2    triamcinolone (KENALOG) 0.1 % cream     Sig: Apply topically 2 times daily. Dispense:  45 g     Refill:  3    nitroGLYCERIN (NITROSTAT) 0.4 MG SL tablet     Sig: Place 1 tablet under the tongue every 5 minutes as needed for Chest pain     Dispense:  25 tablet     Refill:  3    atorvastatin (LIPITOR) 40 MG tablet     Sig: Take 1 tablet by mouth daily     Dispense:  90 tablet     Refill:  3       Patientgiven educational materials - see patient instructions. Discussed use, benefit,and side effects of prescribed medications. All patient questions answered. Ptvoiced understanding. Reviewed health maintenance. Instructed to continue currentmedications, diet and exercise. Patient agreed with treatment plan. Follow up asdirected.      Electronically signed by Walker Petersen MD on 5/15/2020 at 2:07 PM

## 2020-06-01 ENCOUNTER — PRE-PROCEDURE TELEPHONE (OUTPATIENT)
Dept: PREADMISSION TESTING | Age: 75
End: 2020-06-01

## 2020-06-01 NOTE — TELEPHONE ENCOUNTER
RN spoke with patient regarding scheduled pre-procedure COVID-19 swab on 6/5/20 @ 10:30 am - patient voices understanding.

## 2020-06-05 ENCOUNTER — HOSPITAL ENCOUNTER (OUTPATIENT)
Dept: PREADMISSION TESTING | Age: 75
Setting detail: SPECIMEN
Discharge: HOME | End: 2020-06-09
Payer: MEDICARE

## 2020-06-05 LAB
SARS-COV-2, PCR: NORMAL
SARS-COV-2, RAPID: NORMAL
SARS-COV-2: NOT DETECTED
SOURCE: NORMAL

## 2020-06-05 PROCEDURE — U0003 INFECTIOUS AGENT DETECTION BY NUCLEIC ACID (DNA OR RNA); SEVERE ACUTE RESPIRATORY SYNDROME CORONAVIRUS 2 (SARS-COV-2) (CORONAVIRUS DISEASE [COVID-19]), AMPLIFIED PROBE TECHNIQUE, MAKING USE OF HIGH THROUGHPUT TECHNOLOGIES AS DESCRIBED BY CMS-2020-01-R: HCPCS

## 2020-06-06 ENCOUNTER — TELEPHONE (OUTPATIENT)
Dept: PRIMARY CARE CLINIC | Age: 75
End: 2020-06-06

## 2020-06-10 ENCOUNTER — HOSPITAL ENCOUNTER (OUTPATIENT)
Dept: PULMONOLOGY | Age: 75
Discharge: HOME | End: 2020-06-10
Payer: MEDICARE

## 2020-06-10 PROCEDURE — 6370000000 HC RX 637 (ALT 250 FOR IP): Performed by: INTERNAL MEDICINE

## 2020-06-10 PROCEDURE — 94060 EVALUATION OF WHEEZING: CPT

## 2020-06-10 PROCEDURE — 94729 DIFFUSING CAPACITY: CPT

## 2020-06-10 PROCEDURE — 94640 AIRWAY INHALATION TREATMENT: CPT

## 2020-06-10 PROCEDURE — 94726 PLETHYSMOGRAPHY LUNG VOLUMES: CPT

## 2020-06-10 RX ORDER — ALBUTEROL SULFATE 90 UG/1
4 AEROSOL, METERED RESPIRATORY (INHALATION) ONCE
Status: COMPLETED | OUTPATIENT
Start: 2020-06-10 | End: 2020-06-10

## 2020-06-10 RX ADMIN — ALBUTEROL SULFATE 4 PUFF: 90 AEROSOL, METERED RESPIRATORY (INHALATION) at 10:23

## 2020-06-22 RX ORDER — PANTOPRAZOLE SODIUM 40 MG/1
TABLET, DELAYED RELEASE ORAL
Qty: 90 TABLET | Refills: 3 | OUTPATIENT
Start: 2020-06-22

## 2020-07-01 ENCOUNTER — OFFICE VISIT (OUTPATIENT)
Dept: PULMONOLOGY | Age: 75
End: 2020-07-01
Payer: MEDICARE

## 2020-07-01 VITALS
DIASTOLIC BLOOD PRESSURE: 70 MMHG | WEIGHT: 144.4 LBS | SYSTOLIC BLOOD PRESSURE: 132 MMHG | BODY MASS INDEX: 23.21 KG/M2 | OXYGEN SATURATION: 97 % | HEIGHT: 66 IN | TEMPERATURE: 97.3 F | HEART RATE: 83 BPM

## 2020-07-01 PROBLEM — J47.9 BRONCHIECTASIS WITHOUT COMPLICATION (HCC): Status: ACTIVE | Noted: 2020-07-01

## 2020-07-01 PROCEDURE — 1123F ACP DISCUSS/DSCN MKR DOCD: CPT | Performed by: INTERNAL MEDICINE

## 2020-07-01 PROCEDURE — G8420 CALC BMI NORM PARAMETERS: HCPCS | Performed by: INTERNAL MEDICINE

## 2020-07-01 PROCEDURE — G8427 DOCREV CUR MEDS BY ELIG CLIN: HCPCS | Performed by: INTERNAL MEDICINE

## 2020-07-01 PROCEDURE — 99204 OFFICE O/P NEW MOD 45 MIN: CPT | Performed by: INTERNAL MEDICINE

## 2020-07-01 PROCEDURE — 3017F COLORECTAL CA SCREEN DOC REV: CPT | Performed by: INTERNAL MEDICINE

## 2020-07-01 PROCEDURE — 4040F PNEUMOC VAC/ADMIN/RCVD: CPT | Performed by: INTERNAL MEDICINE

## 2020-07-01 PROCEDURE — 99214 OFFICE O/P EST MOD 30 MIN: CPT

## 2020-07-01 PROCEDURE — 1036F TOBACCO NON-USER: CPT | Performed by: INTERNAL MEDICINE

## 2020-07-01 RX ORDER — ALBUTEROL SULFATE 2.5 MG/3ML
2.5 SOLUTION RESPIRATORY (INHALATION) 3 TIMES DAILY PRN
Qty: 120 EACH | Refills: 3 | Status: SHIPPED | OUTPATIENT
Start: 2020-07-01 | End: 2020-08-19

## 2020-07-01 RX ORDER — DOXYCYCLINE HYCLATE 100 MG/1
100 CAPSULE ORAL 2 TIMES DAILY
Qty: 14 CAPSULE | Refills: 0 | Status: SHIPPED | OUTPATIENT
Start: 2020-07-01 | End: 2020-07-08

## 2020-07-01 NOTE — PATIENT INSTRUCTIONS
You have bronchiectasis which is scarring of the bronchial tubes and bilateral lower lobes of the lung right more than left    I have prescribed you a nebulizer with albuterol medication    Use the nebulizer with albuterol medication 2 times a day morning and evening follow this with 6 to 10 breaths on the Acapella the vibratory airway clearance device and then cough up your secretions and sputum. You can even do this 3 times a day with the albuterol medication via the nebulizer followed by the Acapella. You can watch videos on chest physiotherapy for bronchiectasis online. Do not do the chest physiotherapy or Acapella if you have coughing up of blood. Please see below more information on bronchiectasis      Patient Education        Bronchiectasis: Care Instructions  Your Care Instructions  Bronchiectasis (say \"khbqd-iik-FVS-tuh-rustam\") is a lung problem in which the breathing tubes are stretched and become larger. The buildup of mucus causes the stretching and can lead to swelling and infections. You may find it harder to breathe and cough up mucus out of your lungs. Some people are born with it. Other people get it because of another health problem, usually cystic fibrosis or a lung infection such as pneumonia or tuberculosis. Symptoms are often different for everyone. But a cough that brings up mucus, or sputum, is common. The condition is usually treated with antibiotics, medicines to relax the airways (bronchodilators), and medicines to make it easier to cough up mucus (expectorants). Follow-up care is a key part of your treatment and safety. Be sure to make and go to all appointments, and call your doctor if you are having problems. It's also a good idea to know your test results and keep a list of the medicines you take. How can you care for yourself at home? · Take your antibiotics as directed. Do not stop taking them just because you feel better.  You need to take the full course of antibiotics. · Take your medicines exactly as prescribed. Call your doctor if you think you are having a problem with your medicine. · If you have cystic fibrosis, follow your treatment plan. · If you or your child has bronchiectasis, follow directions from your doctor or respiratory therapist for moving your or your child's body into different positions to help drain fluid. This is called postural drainage, and it helps to ease breathing and prevent infections. · You also may do chest percussion on your child. This is strong clapping of the chest with a cupped hand to vibrate the airways in the lungs. The vibration helps your child cough up mucus. You may see a respiratory therapist to learn how to do chest percussion. · Use an airway clearance device, such as a flutter valve, as directed to help remove mucus from the lungs. · Avoid lung infections. ? Get shots to protect against the flu and pneumococcal disease. ? Wash your hands frequently. ? Avoid close contact with people who have colds or the flu. ? Do not smoke or allow others to smoke around you. If you need help quitting, talk to your doctor about stop-smoking programs and medicines. These can increase your chances of quitting for good. ? Stay inside, if you can, on days when the pollution level is high. When should you call for help? [id number] anytime you think you may need emergency care. For example, call if:  · You have severe trouble breathing. · You cough up more than 1 cup of blood. Call your doctor now or seek immediate medical care if:  · You have chest pain. · You have shortness of breath. · You have a fever. · Your mucus (sputum) is bloody or changes color. Watch closely for changes in your health, and be sure to contact your doctor if:  · You are coughing up more sputum than before. · Your symptoms get worse or do not get better with treatment. Where can you learn more? Go to [email or web address]. org and sign

## 2020-07-05 NOTE — PROGRESS NOTES
REASON FOR THE CONSULTATION:  Cough . Chief Complaint   Patient presents with    New Patient     chronic bronchitis      HISTORY OF PRESENT ILLNESS:    Serafin Kiser is a 76y.o. year old male here for evaluation of recurrent bronchitis infections   Patient walks quite ways , lives in old house and can climb 20 stairs to his bedroom but gets winded after climbing stairs . He has cough with no hemoptysis , has grey , yellow colour , quarter of a cup in 1 day . He has albuterol mdi which he uses prn     When he was 6 years hold had severe pna and was hospitalized for 6 weeks . He had chills and fever for years and was seen at [facility] - was told has scar tissue in lung . By late teens chills and fever dec . LUNG CANCER SCREENING     1. CRITERIA MET    []     CT ORDERED  []      2. CRITERIA NOT MET   [x]      3. REFUSED                    []        REASON CRITERIA NOT MET     1. SMOKING LESS THAN 30 PY  []      2. AGE LESS THAN 55 or GREATER 77 YEARS  []      3. QUIT SMOKING 15 YEARS OR GREATER   []      4. RECENT CT WITH IN 11 MONTHS    []      5. LIFE EXPECTANCY < 5 YEARS   []      6.  SIGNS  AND SYMPTOMS OF LUNG CANCER   []         Immunization   Immunization History   Administered Date(s) Administered    Influenza, High Dose (Fluzone 65 yrs and older) 12/23/2015, 11/04/2016, 11/08/2017    Influenza, Triv, inactivated, subunit, adjuvanted, IM (Fluad 65 yrs and older) 09/12/2019    Pneumococcal Conjugate 13-valent ([place] [facility]) 12/23/2015, 05/05/2017    Pneumococcal Polysaccharide ([id number]) 05/10/2019    Tdap (Boostrix, Adacel) 11/25/2019        Pneumococcal Vaccine     [x] Up to date    [] Indicated   [] Refused  [] Contraindicated       Influenza Vaccine   [] Up to date    [x] Indicated   [] Refused  [] Contraindicated        Pulmonary Rehab   [] Completed   [] Indicated   [] Refused  [] Contraindicated   PAST MEDICAL HISTORY:       Diagnosis Date    [facility]     LILLIANA (degenerative joint disease)     GERD (gastroesophageal reflux disease)     Hearing loss     Mild    Hyperlipidemia     Hypertension     Osteoarthritis of left knee     Overactive bladder     Prostate cancer (HCC)     Status post radical prostatectomy in 2009. Continues to follow with Dr. Yeimy García every 6 months.  Seasonal allergies     Spondylosis     Type II or unspecified type diabetes mellitus without mention of complication, not stated as uncontrolled     Diet controlled         Family History:       Problem Relation Age of Onset    Stroke Father        SURGICAL HISTORY:   Past Surgical History:   Procedure Laterality Date    CARDIAC SURGERY      COLONOSCOPY  03/15/2002    Dr. Karla Malcolm      july 2015 drug-eluting stents placed 2 to the RCA and one to the left circumflex    OTHER SURGICAL HISTORY  08/25/2009    bilateral L3-4, L4-5, L5-S1 steroid facet injection     OR COLON CA SCRN NOT HI RSK IND N/A 2/27/2017    COL[facility] Bolivar Frederick [facility] at [facility]  diverticulosis    PROSTATECTOMY  02/09/2010    Dr. Yadira Pina Left 04/23/2013    TOTAL KNEE ARTHROPLASTY Right 02/06/2018    Dr. Stephanie Ortiz/ [address] ENDOSCOPY  03/15/2002    Dr. Natalia Vieyra:      There  No history of TB or TB exposure. There  No asbestos ,[place] dust exposure. The patient reports  No coal mine, [place], [place], [facility] exposure. History of travel outside the country No  There  is use of   marijuana No   VapingNo  IV heroinNo  Crack cocaineNo  There  Nohot tub exposure. Pets -cats No, dogsYes  Birds No    Occupational history - tool and      TOBACCO:   reports that he quit smoking about 33 years ago. He has a 10.00 pack-year smoking history.  He has never used smokeless tobacco.  ETOH:   reports no history of alcohol use.      ALLERGIES:      Allergies   Allergen Reactions    Zonisamide Other (See Comments)     ANXIETY         Home Meds:   Prior to Admission medications    Medication Sig Start Date End Date Taking? Authorizing Provider   albuterol (PROVENTIL) (2.5 MG/3ML) 0.083% nebulizer solution Take 3 mLs by nebulization 3 times daily as needed for Wheezing (For secretion clearance) 7/1/20  Yes Lul Cramer MD   doxycycline hyclate (VIBRAMYCIN) 100 MG capsule Take 1 capsule by mouth 2 times daily for 7 days 7/1/20 7/8/20 Yes Lul Cramer MD   albuterol sulfate  (90 Base) MCG/ACT inhaler Inhale 2 puffs into the lungs every 6 hours as needed for Wheezing 5/15/20  Yes Dana Rendon MD   ondansetron (ZOFRAN) 4 MG tablet take 1 tablet by mouth every 8 hours if needed for nausea and vomiting 5/15/20  Yes Dana Rendon MD   triamcinolone (KENALOG) 0.1 % cream Apply topically 2 times daily.  5/15/20  Yes Dana Rendon MD   nitroGLYCERIN (NITROSTAT) 0.4 MG SL tablet Place 1 tablet under the tongue every 5 minutes as needed for Chest pain 5/15/20  Yes Dana Rendon MD   atorvastatin (LIPITOR) 40 MG tablet Take 1 tablet by mouth daily 5/15/20  Yes Dana Rendon MD   lisinopril (PRINIVIL;ZESTRIL) 20 MG tablet take 1 tablet by mouth once daily 5/14/20  Yes Dana Rendon MD   pantoprazole (PROTONIX) 40 MG tablet take 1 tablet by mouth once daily 4/1/20  Yes Dana Rendon MD   clopidogrel (PLAVIX) 75 MG tablet take 1 tablet by mouth once daily 9/30/19  Yes Tobias Ware DO   HYDROcodone-acetaminophen ([place]) 7.5-325 MG per tablet take 1 tablet by mouth three times a day if needed for pain ( MAX 3 TABS IN 24 HOURS ) 11/1/17  Yes Historical Provider, MD   fexofenadine (ALLEGRA) 180 MG tablet Take 180 mg by mouth daily   Yes Historical Provider, MD   metoprolol tartrate (LOPRESSOR) 50 MG tablet take 1 tablet by mouth twice a day 3/19/18  Yes Dana Rendon MD   hydrochlorothiazide (HYDRODIURIL) 25 MG tablet Take 25 mg by mouth daily   Yes Historical Provider, MD   aspirin 81 MG tablet Take 81 mg by mouth daily   Yes Historical Provider, MD   cyclobenzaprine (FLEXERIL) 10 MG tablet Take 10 mg by mouth 3 times daily as needed for Muscle spasms    Historical Provider, MD              REVIEW OF SYSTEMS:    CONSTITUTIONAL:  negative for  fevers, chills, sweats, fatigue, malaise, anorexia and weight loss  EYES:  negative for  double vision, blurred vision, dry eyes, eye discharge and redness  HEENT:  negative for  hearing loss, tinnitus, ear drainage, earaches and nasal congestion  RESPIRATORY:  See hpi  CARDIOVASCULAR:  negative for  chest pain,, palpitations, orthopnea, PND  GASTROINTESTINAL:  negative for nausea, vomiting, change in bowel habits, diarrhea, constipation, abdominal pain, pruritus, abdominal mass and abdominal distention  GENITOURINARY:  negative for frequency, dysuria, nocturia, urinary incontinence and hesitancy  INTEGUMENT  negative for rash, skin lesion(s), dryness, skin color change, changes in lesion, pruritus and changes in hair  HEMATOLOGIC/LYMPHATIC:  negative for easy bruising, bleeding, lymphadenopathy, petechiae and swelling/edema  ALLERGIC/IMMUNOLOGIC:  negative for recurrent infections, urticaria and drug reactions  ENDOCRINE:  negative for heat intolerance, cold intolerance, tremor, weight changes and change in bowel habits  MUSCULOSKELETAL:  negative for  myalgias, arthralgias, pain, joint swelling, stiff joints and decreased range of motion  NEUROLOGICAL:  negative for headaches, dizziness, seizures, memory problems, speech problems, visual disturbance and coordination problems  BEHAVIOR/PSYCH:  negative for poor appetite, increased appetite, decreased sleep, increased sleep, decreased energy level, increased energy level and poor concentration  Skin no rash no dermatitis  Vitals:  /70 (Site: Right Upper Arm, Position: Sitting, Cuff Size: Medium Adult)   Pulse 83   Temp 97.3 °F (36.3 °C)   Ht 5' 5.98\" (1.676 m)   Wt 144 lb 6.4 oz (65.5 kg)   SpO2 97%   BMI 23.32 kg/m²     PHYSICAL EXAM:  General Appearance:    Alert, cooperative, no distress, appears stated age   Head:    Normocephalic, without obvious abnormality, atraumatic      Eyes:    PERRL, conjunctiva/corneas clear, EOM's intact   Ears:    Normal  external ear canals, both ears   Nose:   Nares normal, septum midline, mucosa normal, no drainage        or sinus tenderness   Throat:   Lips, mucosa, and tongue normal; teeth and gums normal   Neck:   Supple, symmetrical, trachea midline, no adenopathy;     thyroid:  no enlargement/tenderness/nodules; no carotid    bruit , noJVD   Back:     Symmetric, no curvature, ROM normal, no CVA tenderness   Lungs:     Wheezing b/l and rales b/l rt > left base    Chest Wall:    No tenderness or deformity      Heart:    Regular rate and rhythm, S1 and S2 normal, no murmur, rub        or gallop no rvh                           Abdomen:                                                 Pulses:                              Skin:                  Lymph nodes:                    Neurologic:                  Soft, non-tender, bowel sounds active all four quadrants,     no masses, no organomegaly         2+ and symmetric all extremities     Skin color, texture, turgor normal, no rashes or lesions       Cervical, supraclavicular not enlarged or matted or tender      CNII-XII intact, normal strength 5/5 . Sensation grossly normal  and reflexes normal 2+  throughout     Clubbing No  Lower ext edema No  Upper ext edema No         Thyroid:   Lab Results   Component Value Date    TSH 0.89 07/11/2015             IMPRESSION:     Diagnosis Orders   1.  Bronchiectasis with acute lower respiratory infection ([facility]  DME Order for Nebulizer as OP    albuterol (PROVENTIL) (2.5 MG/3ML) 0.083% nebulizer solution    doxycycline hyclate (VIBRAMYCIN) 100 MG capsule    CT Chest High Resolution        :                PLAN: patient has bronchiectasis right lower lobe greater than left  Basal . He has wheezing and purulent sputum . This was seen in ct abdomen and pelvis lung cuts of 2013 as well . 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 He will need immunoglobulin level evaluation , alpha one anti trypsin level . Start albuterol nebulized and provided him acapella marckse   Showed him postural drainage techniques . hrct   Follow up 1 month      Requested Prescriptions     Signed Prescriptions Disp Refills    albuterol (PROVENTIL) (2.5 MG/3ML) 0.083% nebulizer solution 120 each 3     Sig: Take 3 mLs by nebulization 3 times daily as needed for Wheezing (For secretion clearance)    doxycycline hyclate (VIBRAMYCIN) 100 MG capsule 14 capsule 0     Sig: Take 1 capsule by mouth 2 times daily for 7 days       There are no discontinued medications. Adrienne Calvert received counseling on the following healthy behaviors: nutrition, exercise and medication adherence    Patient given educational materials : see patient instruction       Discussed use, benefit, and side effects of prescribed medications. Barriers to medication compliance addressed. All patient questions answered. Pt voiced understanding. I hope this updates you on my evaluation and clinical thinking. Thank you for allowing me to participate in his care. Sincerely,      Electronically signed by Teo Damico MD on   7/5/20 at 2:33 PM EDT       Please note that this chart was generated using voice recognition Dragon dictation software. Although every effort was made to ensure the accuracy of this automated transcription, some errors in transcription may have occurred.

## 2020-07-28 ENCOUNTER — TELEPHONE (OUTPATIENT)
Dept: PULMONOLOGY | Age: 75
End: 2020-07-28

## 2020-07-28 RX ORDER — LEVALBUTEROL TARTRATE 45 UG/1
1 AEROSOL, METERED ORAL EVERY 6 HOURS PRN
Qty: 1 INHALER | Refills: 1 | Status: SHIPPED | OUTPATIENT
Start: 2020-07-28 | End: 2021-11-17 | Stop reason: SDUPTHER

## 2020-07-28 NOTE — TELEPHONE ENCOUNTER
Patient called stating the Albuterol Sulfate HFA is helping his chest but upsetting his digestive system real bad. He is wondering if there is any other medication he could try.       [facility]any

## 2020-08-03 ENCOUNTER — OFFICE VISIT (OUTPATIENT)
Dept: CARDIOLOGY | Age: 75
End: 2020-08-03
Payer: MEDICARE

## 2020-08-03 VITALS
BODY MASS INDEX: 24.16 KG/M2 | HEART RATE: 59 BPM | HEIGHT: 65 IN | SYSTOLIC BLOOD PRESSURE: 124 MMHG | DIASTOLIC BLOOD PRESSURE: 60 MMHG | WEIGHT: 145 LBS

## 2020-08-03 PROCEDURE — G8420 CALC BMI NORM PARAMETERS: HCPCS | Performed by: INTERNAL MEDICINE

## 2020-08-03 PROCEDURE — 93010 ELECTROCARDIOGRAM REPORT: CPT | Performed by: INTERNAL MEDICINE

## 2020-08-03 PROCEDURE — 1036F TOBACCO NON-USER: CPT | Performed by: INTERNAL MEDICINE

## 2020-08-03 PROCEDURE — 93005 ELECTROCARDIOGRAM TRACING: CPT | Performed by: INTERNAL MEDICINE

## 2020-08-03 PROCEDURE — 99214 OFFICE O/P EST MOD 30 MIN: CPT

## 2020-08-03 PROCEDURE — 3017F COLORECTAL CA SCREEN DOC REV: CPT | Performed by: INTERNAL MEDICINE

## 2020-08-03 PROCEDURE — 99214 OFFICE O/P EST MOD 30 MIN: CPT | Performed by: INTERNAL MEDICINE

## 2020-08-03 PROCEDURE — G8427 DOCREV CUR MEDS BY ELIG CLIN: HCPCS | Performed by: INTERNAL MEDICINE

## 2020-08-03 PROCEDURE — 4040F PNEUMOC VAC/ADMIN/RCVD: CPT | Performed by: INTERNAL MEDICINE

## 2020-08-03 PROCEDURE — 1123F ACP DISCUSS/DSCN MKR DOCD: CPT | Performed by: INTERNAL MEDICINE

## 2020-08-03 NOTE — PATIENT INSTRUCTIONS
The Cardiopulmonary Testing Facility is located in the basement of [facility]. Please check in for your testing appointment at the [facility] Physical Therapy desk. Nuclear Stress Test      Please allow 3 hours to complete this test.    Central Scheduling will call you to schedule this test. If you do not receive a call within 48 hours, please call to schedule at [phone]. Please report to Physical Therapy in the BASEMENT of [facility].     >>  Nothing to eat or drink except water for FOUR (4) HOURS prior to arrival time. >>  No caffeine for 24 hours prior to test. This includes decaffeinated beverages,  chocolate, tea and cola drinks. >>  If you are diabetic, check with your Primary Care Provider regarding changes in your insulin or diabetic pills on test day.    >>  No smoking for 12 hours before the test.     >> Take regular medications.     >> Hold the following medications prior to day of stress test:     >>  If you are walking on the treadmill, wear comfortable clothes and shoes. Please do not apply lotion or oils to skin on the day of stress test.     +++ Pregnancy +++   If you are a woman of child bearing age, you will need to complete a blood test prior to your stress test.   Please notify the nurse if you think you might be pregnant.

## 2020-08-03 NOTE — PROGRESS NOTES
[address] [facility] [address]  Dept: [phone]  Loc: [phone]    CC: follow up for CAD    HPI:  The patient is a 76y.o. year old, , male is in the office for a follow up visit. Admits to some SOB, some FAULKNER. Has bronchiectasis, not sure if its related. Denies any chest pains, orthopnea, pnd, le edema. Past Medical History:   has a past medical history of Chronic bronchitis ([facility] [address] DJD (degenerative joint disease), GERD (gastroesophageal reflux disease), Hearing loss, Hyperlipidemia, Hypertension, Osteoarthritis of left knee, Overactive bladder, Prostate cancer ([facility] [address] Seasonal allergies, Spondylosis, and Type II or unspecified type diabetes mellitus without mention of complication, not stated as uncontrolled. Past Surgical History:   has a past surgical history that includes Upper gastrointestinal endoscopy (03/15/2002); Colonoscopy (03/15/2002); Prostatectomy (02/09/2010); Total knee arthroplasty (Left, 04/23/2013); Tonsillectomy; Cardiac surgery; Coronary angioplasty with stent; other surgical history (08/25/2009); pr colon ca scrn not hi rsk ind (N/A, 2/27/2017); and Total knee arthroplasty (Right, 02/06/2018). Home Medications:  Prior to Admission medications    Medication Sig Start Date End Date Taking?  Authorizing Provider   levalbuterol Dilcia Hernández HFA) 45 MCG/ACT inhaler Inhale 1 puff into the lungs every 6 hours as needed for Wheezing or Shortness of Breath 7/28/20 7/28/21 Yes Thais Kwon MD   albuterol (PROVENTIL) (2.5 MG/3ML) 0.083% nebulizer solution Take 3 mLs by nebulization 3 times daily as needed for Wheezing (For secretion clearance) 7/1/20  Yes Thais Kwon MD   ondansetron (ZOFRAN) 4 MG tablet take 1 tablet by mouth every 8 hours if needed for nausea and vomiting 5/15/20  Yes Kelvin Euceda MD   triamcinolone (KENALOG) 0.1 % cream Apply topically 2 times daily. 5/15/20  Yes Dana Rendon MD   nitroGLYCERIN (NITROSTAT) 0.4 MG SL tablet Place 1 tablet under the tongue every 5 minutes as needed for Chest pain 5/15/20  Yes Dana Rendon MD   atorvastatin (LIPITOR) 40 MG tablet Take 1 tablet by mouth daily 5/15/20  Yes Dana Rendon MD   lisinopril (PRINIVIL;ZESTRIL) 20 MG tablet take 1 tablet by mouth once daily 5/14/20  Yes Dana Rendon MD   pantoprazole (PROTONIX) 40 MG tablet take 1 tablet by mouth once daily 4/1/20  Yes Dana Rendon MD   clopidogrel (PLAVIX) 75 MG tablet take 1 tablet by mouth once daily 9/30/19  Yes Tobias Ware DO   HYDROcodone-acetaminophen ([facility]) 7.5-325 MG per tablet take 1 tablet by mouth three times a day if needed for pain ( MAX 3 TABS IN 24 HOURS ) 11/1/17  Yes Historical Provider, MD   fexofenadine (ALLEGRA) 180 MG tablet Take 180 mg by mouth daily   Yes Historical Provider, MD   metoprolol tartrate (LOPRESSOR) 50 MG tablet take 1 tablet by mouth twice a day 3/19/18  Yes Dana Rendon MD   hydrochlorothiazide (HYDRODIURIL) 25 MG tablet Take 25 mg by mouth daily   Yes Historical Provider, MD   cyclobenzaprine (FLEXERIL) 10 MG tablet Take 10 mg by mouth 3 times daily as needed for Muscle spasms   Yes Historical Provider, MD   aspirin 81 MG tablet Take 81 mg by mouth daily   Yes Historical Provider, MD       Allergies:  Zonisamide    Social History:   reports that he quit smoking about 33 years ago. He has a 10.00 pack-year smoking history. He has never used smokeless tobacco. He reports that he does not drink alcohol or use drugs. Review of Systems:  · Constitutional: there has been no unanticipated weight loss. There's been No change in energy level, No change in activity level. · Eyes: No visual changes or diplopia. No scleral icterus. · ENT: No Headaches, hearing loss or vertigo. No mouth sores or sore throat.   · Cardiovascular: As hpi  · Respiratory: as hpi  · Gastrointestinal: No abdominal pain, appetite

## 2020-08-05 ENCOUNTER — OFFICE VISIT (OUTPATIENT)
Dept: PRIMARY CARE CLINIC | Age: 75
End: 2020-08-05
Payer: MEDICARE

## 2020-08-05 VITALS
DIASTOLIC BLOOD PRESSURE: 74 MMHG | TEMPERATURE: 98.2 F | SYSTOLIC BLOOD PRESSURE: 108 MMHG | BODY MASS INDEX: 24.58 KG/M2 | OXYGEN SATURATION: 98 % | WEIGHT: 147.7 LBS | HEART RATE: 74 BPM

## 2020-08-05 PROCEDURE — 3017F COLORECTAL CA SCREEN DOC REV: CPT | Performed by: FAMILY MEDICINE

## 2020-08-05 PROCEDURE — G8427 DOCREV CUR MEDS BY ELIG CLIN: HCPCS | Performed by: FAMILY MEDICINE

## 2020-08-05 PROCEDURE — 4040F PNEUMOC VAC/ADMIN/RCVD: CPT | Performed by: FAMILY MEDICINE

## 2020-08-05 PROCEDURE — 1123F ACP DISCUSS/DSCN MKR DOCD: CPT | Performed by: FAMILY MEDICINE

## 2020-08-05 PROCEDURE — 99212 OFFICE O/P EST SF 10 MIN: CPT

## 2020-08-05 PROCEDURE — G8420 CALC BMI NORM PARAMETERS: HCPCS | Performed by: FAMILY MEDICINE

## 2020-08-05 PROCEDURE — 1036F TOBACCO NON-USER: CPT | Performed by: FAMILY MEDICINE

## 2020-08-05 PROCEDURE — 99214 OFFICE O/P EST MOD 30 MIN: CPT | Performed by: FAMILY MEDICINE

## 2020-08-05 RX ORDER — DICYCLOMINE HYDROCHLORIDE 10 MG/1
10 CAPSULE ORAL 4 TIMES DAILY PRN
Qty: 30 CAPSULE | Refills: 0 | Status: SHIPPED | OUTPATIENT
Start: 2020-08-05 | End: 2020-10-12

## 2020-08-05 ASSESSMENT — PATIENT HEALTH QUESTIONNAIRE - PHQ9
1. LITTLE INTEREST OR PLEASURE IN DOING THINGS: 0
SUM OF ALL RESPONSES TO PHQ QUESTIONS 1-9: 0
SUM OF ALL RESPONSES TO PHQ QUESTIONS 1-9: 0
2. FEELING DOWN, DEPRESSED OR HOPELESS: 0
SUM OF ALL RESPONSES TO PHQ9 QUESTIONS 1 & 2: 0

## 2020-08-05 ASSESSMENT — ENCOUNTER SYMPTOMS
CHANGE IN BOWEL HABIT: 1
CONSTIPATION: 0
ABDOMINAL PAIN: 1
NAUSEA: 1
ABDOMINAL DISTENTION: 1
RESPIRATORY NEGATIVE: 1
EYES NEGATIVE: 1
VOMITING: 0
DIARRHEA: 1

## 2020-08-05 NOTE — PROGRESS NOTES
Medication Sig Taking? Authorizing Provider   levalbuterol (XOPENEX HFA) 45 MCG/ACT inhaler Inhale 1 puff into the lungs every 6 hours as needed for Wheezing or Shortness of Breath Yes Scooby Anton MD   albuterol (PROVENTIL) (2.5 MG/3ML) 0.083% nebulizer solution Take 3 mLs by nebulization 3 times daily as needed for Wheezing (For secretion clearance) Yes Scooby Anton MD   ondansetron (ZOFRAN) 4 MG tablet take 1 tablet by mouth every 8 hours if needed for nausea and vomiting Yes Pasha Mcqueen MD   triamcinolone (KENALOG) 0.1 % cream Apply topically 2 times daily.  Yes Pasha Mcqueen MD   nitroGLYCERIN (NITROSTAT) 0.4 MG SL tablet Place 1 tablet under the tongue every 5 minutes as needed for Chest pain Yes Pasha Mcqueen MD   atorvastatin (LIPITOR) 40 MG tablet Take 1 tablet by mouth daily Yes Pasha Mcqueen MD   lisinopril (PRINIVIL;ZESTRIL) 20 MG tablet take 1 tablet by mouth once daily Yes Pasha Mcqueen MD   pantoprazole (PROTONIX) 40 MG tablet take 1 tablet by mouth once daily Yes Pasha Mcqueen MD   clopidogrel (PLAVIX) 75 MG tablet take 1 tablet by mouth once daily Yes Tobias Ware DO   HYDROcodone-acetaminophen ([facility]) 7.5-325 MG per tablet take 1 tablet by mouth three times a day if needed for pain ( MAX 3 TABS IN 24 HOURS ) Yes Historical Provider, MD   fexofenadine (ALLEGRA) 180 MG tablet Take 180 mg by mouth daily Yes Historical Provider, MD   metoprolol tartrate (LOPRESSOR) 50 MG tablet take 1 tablet by mouth twice a day Yes Pasha Mcqueen MD   hydrochlorothiazide (HYDRODIURIL) 25 MG tablet Take 25 mg by mouth daily Yes Historical Provider, MD   cyclobenzaprine (FLEXERIL) 10 MG tablet Take 10 mg by mouth 3 times daily as needed for Muscle spasms Yes Historical Provider, MD   aspirin 81 MG tablet Take 81 mg by mouth daily Yes Historical Provider, MD        Social History     Tobacco Use    Smoking status: Former Smoker     Packs/day: 0.50     Years: 20.00     Pack years: 10.00 Last attempt to quit: 1987     Years since quittin.2    Smokeless tobacco: Never Used    Tobacco comment: Quit 30 years ago. [facility]   Substance Use Topics    Alcohol use: No     Alcohol/week: 0.0 standard drinks        Vitals:    20 1807   BP: 108/74   Site: Left Upper Arm   Position: Sitting   Cuff Size: Large Adult   Pulse: 74   Temp: 98.2 °F (36.8 °C)   TempSrc: Tympanic   SpO2: 98%   Weight: 147 lb 11.2 oz (67 kg)     Estimated body mass index is 24.58 kg/m² as calculated from the following:    Height as of 8/3/20: 5' 5\" (1.651 m). Weight as of this encounter: 147 lb 11.2 oz (67 kg). Physical Exam  Vitals signs and nursing note reviewed. Constitutional:       General: He is not in acute distress. Appearance: Normal appearance. He is well-developed. He is not diaphoretic. HENT:      Head: Normocephalic and atraumatic. Nose: Nose normal.   Eyes:      General:         Right eye: No discharge. Left eye: No discharge. Conjunctiva/sclera: Conjunctivae normal.   Neck:      Musculoskeletal: Normal range of motion and neck supple. No neck rigidity. Cardiovascular:      Rate and Rhythm: Normal rate and regular rhythm. Pulmonary:      Effort: Pulmonary effort is normal. No respiratory distress. Breath sounds: Normal breath sounds. No wheezing. Abdominal:      General: Bowel sounds are normal. There is no distension. Palpations: Abdomen is soft. There is no mass. Tenderness: There is no abdominal tenderness. There is no guarding or rebound. Lymphadenopathy:      Cervical: No cervical adenopathy. Skin:     General: Skin is warm and dry. Neurological:      Mental Status: He is alert and oriented to person, place, and time. Psychiatric:         Behavior: Behavior normal.         Thought Content: Thought content normal.         Judgment: Judgment normal.         ASSESSMENT/PLAN:    Encounter Diagnoses   Name Primary?     Adverse effect of drug, initial encounter Yes    Bloating      \  Orders Placed This Encounter   Medications    dicyclomine (BENTYL) 10 MG capsule     Sig: Take 1 capsule by mouth 4 times daily as needed (bowel cramping or bloating)     Dispense:  30 capsule     Refill:  0     Will try treatment with bentyl as noted. Did recommend that he take probiotic daily or eat yogurt daily to see if this will help with his symptoms. Symptoms developed after using albuterol aerosols. Did look up potential adverse reactions and there is a possibility of GI side effects with this medical therapy. Patient to discuss further medical intervention with pulmonary at his follow up visit. [place] diet recommended. Return  if no improvement in symptoms or if any further symptoms arise. No follow-ups on file. An electronic signature was used to authenticate this note.     --Tera Hogue DO on 8/5/2020 at 6:20 PM

## 2020-08-12 ENCOUNTER — HOSPITAL ENCOUNTER (OUTPATIENT)
Dept: CT IMAGING | Age: 75
Discharge: HOME | End: 2020-08-14
Payer: MEDICARE

## 2020-08-12 PROCEDURE — 71250 CT THORAX DX C-: CPT

## 2020-08-19 ENCOUNTER — OFFICE VISIT (OUTPATIENT)
Dept: PULMONOLOGY | Age: 75
End: 2020-08-19
Payer: MEDICARE

## 2020-08-19 VITALS
BODY MASS INDEX: 24.19 KG/M2 | TEMPERATURE: 97.6 F | WEIGHT: 145.2 LBS | OXYGEN SATURATION: 97 % | HEIGHT: 65 IN | DIASTOLIC BLOOD PRESSURE: 62 MMHG | HEART RATE: 56 BPM | SYSTOLIC BLOOD PRESSURE: 138 MMHG

## 2020-08-19 PROBLEM — J47.0 BRONCHIECTASIS WITH ACUTE LOWER RESPIRATORY INFECTION (HCC): Status: ACTIVE | Noted: 2020-07-01

## 2020-08-19 PROCEDURE — 1123F ACP DISCUSS/DSCN MKR DOCD: CPT | Performed by: INTERNAL MEDICINE

## 2020-08-19 PROCEDURE — 99213 OFFICE O/P EST LOW 20 MIN: CPT

## 2020-08-19 PROCEDURE — G8427 DOCREV CUR MEDS BY ELIG CLIN: HCPCS | Performed by: INTERNAL MEDICINE

## 2020-08-19 PROCEDURE — 1036F TOBACCO NON-USER: CPT | Performed by: INTERNAL MEDICINE

## 2020-08-19 PROCEDURE — 4040F PNEUMOC VAC/ADMIN/RCVD: CPT | Performed by: INTERNAL MEDICINE

## 2020-08-19 PROCEDURE — G8420 CALC BMI NORM PARAMETERS: HCPCS | Performed by: INTERNAL MEDICINE

## 2020-08-19 PROCEDURE — 99214 OFFICE O/P EST MOD 30 MIN: CPT | Performed by: INTERNAL MEDICINE

## 2020-08-19 PROCEDURE — 3017F COLORECTAL CA SCREEN DOC REV: CPT | Performed by: INTERNAL MEDICINE

## 2020-08-19 NOTE — PROGRESS NOTES
REASON FOR THE CONSULTATION:  Cough . Chief Complaint   Patient presents with    Follow-up     CT       HISTORY OF PRESENT ILLNESS:    Melanie Ramos is a 76y.o. year old male here for evaluation of recurrent bronchitis infections   Had diarrhea and abdominal discomfort from doxycycline   Now better but thinks that albuterol nebulized medication may have caused those symptoms . He is now has xopenex mdi   Likes acapella , this has helped clear his secretions   1/2 cup in a day   No hemoptysis     Last visit   Patient walks quite ways , lives in old house and can climb 20 stairs to his bedroom but gets winded after climbing stairs . He has cough with no hemoptysis , has grey , yellow colour , quarter of a cup in 1 day . He has albuterol mdi which he uses prn     When he was 6 years hold had severe pna and was hospitalized for 6 weeks . He had chills and fever for years and was seen at [facility] - was told has scar tissue in lung . By late teens chills and fever dec . LUNG CANCER SCREENING     1. CRITERIA MET    []     CT ORDERED  []      2. CRITERIA NOT MET   [x]      3. REFUSED                    []        REASON CRITERIA NOT MET     1. SMOKING LESS THAN 30 PY  []      2. AGE LESS THAN 55 or GREATER 77 YEARS  []      3. QUIT SMOKING 15 YEARS OR GREATER   []      4. RECENT CT WITH IN 11 MONTHS    []      5. LIFE EXPECTANCY < 5 YEARS   []      6.  SIGNS  AND SYMPTOMS OF LUNG CANCER   []         Immunization   Immunization History   Administered Date(s) Administered    Influenza, High Dose (Fluzone 65 yrs and older) 12/23/2015, 11/04/2016, 11/08/2017    Influenza, Triv, inactivated, subunit, adjuvanted, IM (Fluad 65 yrs and older) 09/12/2019    Pneumococcal Conjugate 13-valent (Carley Kyle) 12/23/2015, 05/05/2017    Pneumococcal Polysaccharide (Dpxtmgvit00) 05/10/2019    Tdap (Boostrix, Adacel) 11/25/2019        Pneumococcal Vaccine     [x] Up to date    [] Indicated   [] Refused  [] Contraindicated Influenza Vaccine   [] Up to date    [x] Indicated   [] Refused  [] Contraindicated        Pulmonary Rehab   [] Completed   [] Indicated   [] Refused  [] Contraindicated   PAST MEDICAL HISTORY:       Diagnosis Date    Chronic bronchitis ([facility] [address]     DJD (degenerative joint disease)     GERD (gastroesophageal reflux disease)     Hearing loss     Mild    Hyperlipidemia     Hypertension     Osteoarthritis of left knee     Overactive bladder     Prostate cancer ([facility] [address]     Status post radical prostatectomy in 2009. Continues to follow with Dr. Yohana Sargent every 6 months.  Seasonal allergies     Spondylosis     Type II or unspecified type diabetes mellitus without mention of complication, not stated as uncontrolled     Diet controlled         Family History:       Problem Relation Age of Onset    Stroke Father        SURGICAL HISTORY:   Past Surgical History:   Procedure Laterality Date    CARDIAC SURGERY      COLONOSCOPY  03/15/2002    Dr. Avni Cheek      july 2015 drug-eluting stents placed 2 to the RCA and one to the left circumflex    OTHER SURGICAL HISTORY  08/25/2009    bilateral L3-4, L4-5, L5-S1 steroid facet injection     CO COLON CA SCRN NOT HI RSK IND N/A 2/27/2017    COLONOSCOPY performed by Moisés Hubbard MD at [address] [place] [address] OR  diverticulosis    PROSTATECTOMY  02/09/2010    Dr. Starr Hernandez Left 04/23/2013    TOTAL KNEE ARTHROPLASTY Right 02/06/2018    Dr. Jackelyn Ortiz/ [facility] [address] ENDOSCOPY  03/15/2002    Dr. Christ Solorzano:      There  No history of TB or TB exposure. There  No asbestos ,[place] dust exposure. The patient reports  No coal mine, [place], [place], [facility] exposure. History of travel outside the country No  There  is use of   marijuana No   VapingNo  IV heroinNo  Crack cocaineNo  There  Nohot tub exposure. Pets -cats No, dogsYes  Birds No    Occupational history - tool and      TOBACCO:   reports that he quit smoking about 33 years ago. He has a 10.00 pack-year smoking history. He has never used smokeless tobacco.  ETOH:   reports no history of alcohol use. ALLERGIES:      Allergies   Allergen Reactions    Zonisamide Other (See Comments)     ANXIETY         Home Meds:   Prior to Admission medications    Medication Sig Start Date End Date Taking? Authorizing Provider   dicyclomine (BENTYL) 10 MG capsule Take 1 capsule by mouth 4 times daily as needed (bowel cramping or bloating) 8/5/20  Yes Donnell Isabel DO   levalbuterol [facility]) 45 MCG/ACT inhaler Inhale 1 puff into the lungs every 6 hours as needed for Wheezing or Shortness of Breath 7/28/20 7/28/21 Yes Humphrey Barahona MD   triamcinolone (KENALOG) 0.1 % cream Apply topically 2 times daily.  5/15/20  Yes Castillo Hearn MD   atorvastatin (LIPITOR) 40 MG tablet Take 1 tablet by mouth daily 5/15/20  Yes Castillo Hearn MD   lisinopril (PRINIVIL;ZESTRIL) 20 MG tablet take 1 tablet by mouth once daily 5/14/20  Yes Castillo Hearn MD   pantoprazole (PROTONIX) 40 MG tablet take 1 tablet by mouth once daily 4/1/20  Yes Castillo Hearn MD   clopidogrel (PLAVIX) 75 MG tablet take 1 tablet by mouth once daily 9/30/19  Yes Tobias Ware DO   HYDROcodone-acetaminophen (NORCO) 7.5-325 MG per tablet take 1 tablet by mouth three times a day if needed for pain ( MAX 3 TABS IN 24 HOURS ) 11/1/17  Yes Historical Provider, MD   fexofenadine (ALLEGRA) 180 MG tablet Take 180 mg by mouth daily   Yes Historical Provider, MD   metoprolol tartrate (LOPRESSOR) 50 MG tablet take 1 tablet by mouth twice a day 3/19/18  Yes Castillo Hearn MD   hydrochlorothiazide (HYDRODIURIL) 25 MG tablet Take 25 mg by mouth daily   Yes Historical Provider, MD   aspirin 81 MG tablet Take 81 mg by mouth daily   Yes Historical Provider, MD   ondansetron (ZOFRAN) 4 MG tablet take 1 tablet by mouth every 8 hours if needed for nausea and vomiting  Patient not taking: Reported on 8/19/2020 5/15/20   Marcelo Garcia MD   nitroGLYCERIN (NITROSTAT) 0.4 MG SL tablet Place 1 tablet under the tongue every 5 minutes as needed for Chest pain  Patient not taking: Reported on 8/19/2020 5/15/20   Marcelo Garcia MD   cyclobenzaprine (FLEXERIL) 10 MG tablet Take 10 mg by mouth 3 times daily as needed for Muscle spasms    Historical Provider, MD            Review of Systems -  General ROS: negative for - chills, fatigue, fever or weight loss  ENT ROS: negative for - headaches, oral lesions or sore throat  Cardiovascular ROS: no chest pain , orthopnea or pnd   Gastrointestinal ROS: no abdominal pain, change in bowel habits, or black or bloody stools  Skin - no rash   Neuro - no blurry vision , no loc . No focal weakness   msk - no jt tenderness or swelling    Vascular - no claudication , rest completed and negative   Lymphatic - complete and negative   Hematology - oncology - complete and negative   Allergy immunology - complete and negative    no burning or hematuria    Vitals:  /62   Pulse 56   Temp 97.6 °F (36.4 °C)   Ht 5' 5\" (1.651 m)   Wt 145 lb 3.2 oz (65.9 kg)   SpO2 97%   BMI 24.16 kg/m²     PHYSICAL EXAM:  Head and neck atraumatic, normocephalic    Lymph nodes-no cervical, supraclavicular lymphadenopathy    Neck-no JVP elevation    Lungs -prolonged exp  Rales rt base >left   No wheeze   CVS- S1, S2 regular. No S3 no S4, no murmurs    Abdomen-nontender, nondistended. Bowel sounds are present. No organomegaly    Lower extremity-no edema    Upper extremity-no edema    Neurological-grossly normal cranial nerves. No overt motor deficit   Thyroid:   Lab Results   Component Value Date    TSH 0.89 07/11/2015         hrct   Impression    Right lower lobe bronchiectasis with peribronchial cuffing and opacification    distal bronchi as well as tree-in-bud nodules in the right lower lobe. Findings suggest infectious/inflammatory bronchopneumonia.  Aspiration is in    the differential.         No evidence of interstitial pulmonary fibrosis. patient has bronchiectasis right lower lobe greater than left  Basal . He has wheezing and purulent sputum . This was seen in ct abdomen and pelvis lung cuts of 2013 as well . 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 IMPRESSION:     Diagnosis Orders   1. Bronchiectasis without complication ([facility] [address]          :                PLAN:      HRCT - right lower lobe bronchiectasis . At present no active infection   Cont acapella and sec management . xopenex as needed   Follow up 3 months       Requested Prescriptions      No prescriptions requested or ordered in this encounter       Medications Discontinued During This Encounter   Medication Reason    albuterol (PROVENTIL) (2.5 MG/3ML) 0.083% nebulizer solution        Eyal Calix received counseling on the following healthy behaviors: nutrition, exercise and medication adherence    Patient given educational materials : see patient instruction       Discussed use, benefit, and side effects of prescribed medications. Barriers to medication compliance addressed. All patient questions answered. Pt voiced understanding. I hope this updates you on my evaluation and clinical thinking. Thank you for allowing me to participate in his care. Sincerely,      Electronically signed by Julia Godwin MD on   7/5/20 at 2:33 PM EDT       Please note that this chart was generated using voice recognition Dragon dictation software. Although every effort was made to ensure the accuracy of this automated transcription, some errors in transcription may have occurred.

## 2020-09-11 RX ORDER — METOPROLOL TARTRATE 50 MG/1
TABLET, FILM COATED ORAL
Qty: 180 TABLET | Refills: 3 | Status: SHIPPED | OUTPATIENT
Start: 2020-09-11 | End: 2021-09-13

## 2020-09-21 ENCOUNTER — HOSPITAL ENCOUNTER (OUTPATIENT)
Dept: NON INVASIVE DIAGNOSTICS | Age: 75
Discharge: HOME | End: 2020-09-21
Payer: MEDICARE

## 2020-09-21 ENCOUNTER — HOSPITAL ENCOUNTER (OUTPATIENT)
Dept: NUCLEAR MEDICINE | Age: 75
Discharge: HOME | End: 2020-09-23
Payer: MEDICARE

## 2020-09-21 PROCEDURE — A9500 TC99M SESTAMIBI: HCPCS | Performed by: INTERNAL MEDICINE

## 2020-09-21 PROCEDURE — 78452 HT MUSCLE IMAGE SPECT MULT: CPT

## 2020-09-21 PROCEDURE — 93018 CV STRESS TEST I&R ONLY: CPT | Performed by: INTERNAL MEDICINE

## 2020-09-21 PROCEDURE — 6360000002 HC RX W HCPCS: Performed by: INTERNAL MEDICINE

## 2020-09-21 PROCEDURE — 3430000000 HC RX DIAGNOSTIC RADIOPHARMACEUTICAL: Performed by: INTERNAL MEDICINE

## 2020-09-21 PROCEDURE — 93017 CV STRESS TEST TRACING ONLY: CPT

## 2020-09-21 RX ADMIN — REGADENOSON 0.4 MG: 0.08 INJECTION, SOLUTION INTRAVENOUS at 11:54

## 2020-09-21 RX ADMIN — TETRAKIS(2-METHOXYISOBUTYLISOCYANIDE)COPPER(I) TETRAFLUOROBORATE 30 MILLICURIE: 1 INJECTION, POWDER, LYOPHILIZED, FOR SOLUTION INTRAVENOUS at 11:54

## 2020-09-21 RX ADMIN — TETRAKIS(2-METHOXYISOBUTYLISOCYANIDE)COPPER(I) TETRAFLUOROBORATE 10 MILLICURIE: 1 INJECTION, POWDER, LYOPHILIZED, FOR SOLUTION INTRAVENOUS at 10:20

## 2020-09-21 NOTE — PROGRESS NOTES
Patient Name:  Skip Mckoy MRN:  [id number]   :  1945  Age:  76 y.o. Sex: male   Ordering Provider:   Marah Garner DO  Referring Provider:   Primary Care Provider:  Vianey Peck MD     Indications: Dyspnea on Exertion; Coronary artery disease     Medications:     Current Outpatient Medications:     metoprolol tartrate (LOPRESSOR) 50 MG tablet, take 1 tablet by mouth twice a day, Disp: 180 tablet, Rfl: 3    dicyclomine (BENTYL) 10 MG capsule, Take 1 capsule by mouth 4 times daily as needed (bowel cramping or bloating), Disp: 30 capsule, Rfl: 0    levalbuterol (XOPENEX HFA) 45 MCG/ACT inhaler, Inhale 1 puff into the lungs every 6 hours as needed for Wheezing or Shortness of Breath, Disp: 1 Inhaler, Rfl: 1    ondansetron (ZOFRAN) 4 MG tablet, take 1 tablet by mouth every 8 hours if needed for nausea and vomiting (Patient not taking: Reported on 2020), Disp: 30 tablet, Rfl: 2    triamcinolone (KENALOG) 0.1 % cream, Apply topically 2 times daily. , Disp: 45 g, Rfl: 3    nitroGLYCERIN (NITROSTAT) 0.4 MG SL tablet, Place 1 tablet under the tongue every 5 minutes as needed for Chest pain (Patient not taking: Reported on 2020), Disp: 25 tablet, Rfl: 3    atorvastatin (LIPITOR) 40 MG tablet, Take 1 tablet by mouth daily, Disp: 90 tablet, Rfl: 3    lisinopril (PRINIVIL;ZESTRIL) 20 MG tablet, take 1 tablet by mouth once daily, Disp: 90 tablet, Rfl: 3    pantoprazole (PROTONIX) 40 MG tablet, take 1 tablet by mouth once daily, Disp: 90 tablet, Rfl: 3    clopidogrel (PLAVIX) 75 MG tablet, take 1 tablet by mouth once daily, Disp: 90 tablet, Rfl: 3    HYDROcodone-acetaminophen (NORCO) 7.5-325 MG per tablet, take 1 tablet by mouth three times a day if needed for pain ( MAX 3 TABS IN 24 HOURS ), Disp: , Rfl:     fexofenadine (ALLEGRA) 180 MG tablet, Take 180 mg by mouth daily, Disp: , Rfl:     hydrochlorothiazide (HYDRODIURIL) 25 MG tablet, Take 25 mg by mouth daily, Disp: , Rfl:    cyclobenzaprine (FLEXERIL) 10 MG tablet, Take 10 mg by mouth 3 times daily as needed for Muscle spasms, Disp: , Rfl:     aspirin 81 MG tablet, Take 81 mg by mouth daily, Disp: , Rfl:     Current Facility-Administered Medications:     regadenoson (LEXISCAN) injection 0.4 mg, 0.4 mg, Intravenous, Once, Tobias Ware DO      ----------------------------------------------------------------------------------------------------------                Lexiscan 0.4 mg injected over 10 seconds. IV Cardiolite injected 20 seconds post Lexiscan injection. Heart Rate:  49  Resting Blood Pressure:  172/76   ----------------------------------------------------------------------------------------------------------     HR BP   1 min 71 181/86   2 min     3 min 75 170/84   4 min     5 min 70 173/79   6 min     7 min 67 163/79   8 min     9 min 67 188/86   10 min       Symptoms:  Chest Pain:  No      Nausea:  Yes     Headache:  No    Shortness of Breath:  Yes     Other:  No      Electronically signed by Sahara Angelo RN on 9/21/20 at 11:41 AM EDT    ----------------------------------------------------------------------------------------------------------  Resting EKG:  Sinus, TWI inferiorly    Arrhythmias:  none     EKG Changes:  none     Interpretation:  - Abnormal baseline ECG with TIW inferiorly, not worsened on ECG portion of stress testing.   - Await Nuclear Portion    Supervising Physician:    [facility]  [facility]  [phone]

## 2020-09-22 NOTE — PROCEDURES
Gut artifact inferiorly. No obvious ischemia or infarction. 2. Normal left ventricular ejection fraction 64%, normal wall motion.           Monika Valero DO    D: 09/22/2020 14:27:23       T: 09/22/2020 14:29:02     /[email or web address]  Job#: [id number]     Doc#: Unknown    CC:  Moise Arana

## 2020-09-24 ENCOUNTER — TELEPHONE (OUTPATIENT)
Dept: CARDIOLOGY | Age: 75
End: 2020-09-24

## 2020-09-24 NOTE — TELEPHONE ENCOUNTER
Left message for patient to call to discuss stress. PATIENT NAME: Selvin Petersen                    :        1945  MED REC NO:   [id number]                             ROOM:  ACCOUNT NO:   [de-identified]                           ADMIT DATE: 2020  PROVIDER:     Jody Edmond DO     DATE OF STUDY:  2020     STRESS TEST     Ordering Provider: Jody Edmnod DO     Primary Care Provider: Marissa Gomes MD     Patient's Age: 76     Height: 5 feet, 5 inches  Weight: 145 pounds     INDICATION:  Shortness of breath, essential hypertension, coronary  artery disease, hyperlipidemia.     Lexiscan 0.4 mg injected over 10 seconds. IV Cardiolite injected 20 seconds post Lexiscan injection.     Heart Rate: 49 Resting blood pressure:  172/76               HR   BP  1 min          71   181/86  2 min  3 min          75   170/84  4 min  5 min          70   173/79  6 min  7 min          67   163/79  8 min  9 min          67   188/86  10 min     Symptoms:  Chest Pain: No.  Nausea: Yes. Headache:  No.  Shortness of breath: Yes. Other: No.     Resting EKG:  Sinus, T-wave inversion inferiorly.     Arrhythmias: None.     EKG changes:  None.     INTERPRETATION:  1. Abnormal baseline ECG with T-wave inversion inferiorly, not worsened  on ECG portion of stress testing. 2. Await Nuclear portion.     Nuclear Myocardial Perfusion Imaging (SPECT)     TESTING METHOD  STRESS:   Lexiscan  AGENT:    Cardiolite  REST:          Injection Date:  20  Time:  1020  Amount:  10.79 mCi  STRESS:   Injection Date:  20  Time:  1154  Amount:  32.1 mCi  IMAGE TIME:    Rest:  1050  Stress:  1230     EF:  64%  TID:  0.99  LHR:  0.28     FINDINGS:  Ischemia (Reversible Defect):           No.  Infarction (Irreversible Defect):       No.  Ejection fraction Calculated:           Normal.  Segmental wall motion:                  Normal.     IMPRESSION:  1. Gut artifact inferiorly. No obvious ischemia or infarction.   2. Normal left ventricular ejection fraction 64%, normal wall motion.              DRE SHEIKH, DO

## 2020-09-25 NOTE — TELEPHONE ENCOUNTER
Notified patient of Stress  results. Instructed patient to keep upcoming appointment with cardiologist and to call our office for any questions. Patient also instructed to utilize the E.D. for any emergent health issues that may arise.

## 2020-10-12 RX ORDER — DICYCLOMINE HYDROCHLORIDE 10 MG/1
CAPSULE ORAL
Qty: 30 CAPSULE | Refills: 0 | Status: SHIPPED | OUTPATIENT
Start: 2020-10-12 | End: 2022-03-22 | Stop reason: SDUPTHER

## 2020-10-23 RX ORDER — CLOPIDOGREL BISULFATE 75 MG/1
TABLET ORAL
Qty: 90 TABLET | Refills: 3 | Status: SHIPPED | OUTPATIENT
Start: 2020-10-23 | End: 2022-01-17

## 2020-11-09 ENCOUNTER — HOSPITAL ENCOUNTER (OUTPATIENT)
Dept: LAB | Age: 75
Discharge: HOME | End: 2020-11-09
Payer: MEDICARE

## 2020-11-09 LAB
CREATININE URINE: 229.1 MG/DL (ref 39–259)
ESTIMATED AVERAGE GLUCOSE: 123 MG/DL
HBA1C MFR BLD: 5.9 % (ref 4.8–5.9)
MICROALBUMIN/CREAT 24H UR: <12 MG/L
MICROALBUMIN/CREAT UR-RTO: NORMAL MCG/MG CREAT
PROSTATE SPECIFIC ANTIGEN: <0.01 UG/L

## 2020-11-09 PROCEDURE — 36415 COLL VENOUS BLD VENIPUNCTURE: CPT

## 2020-11-09 PROCEDURE — 82570 ASSAY OF URINE CREATININE: CPT

## 2020-11-09 PROCEDURE — 82043 UR ALBUMIN QUANTITATIVE: CPT

## 2020-11-09 PROCEDURE — 84153 ASSAY OF PSA TOTAL: CPT

## 2020-11-09 PROCEDURE — 83036 HEMOGLOBIN GLYCOSYLATED A1C: CPT

## 2020-11-16 ENCOUNTER — OFFICE VISIT (OUTPATIENT)
Dept: INTERNAL MEDICINE | Age: 75
End: 2020-11-16
Payer: MEDICARE

## 2020-11-16 VITALS
SYSTOLIC BLOOD PRESSURE: 130 MMHG | WEIGHT: 147 LBS | HEART RATE: 58 BPM | RESPIRATION RATE: 14 BRPM | HEIGHT: 66 IN | BODY MASS INDEX: 23.63 KG/M2 | DIASTOLIC BLOOD PRESSURE: 70 MMHG

## 2020-11-16 PROCEDURE — G8427 DOCREV CUR MEDS BY ELIG CLIN: HCPCS | Performed by: INTERNAL MEDICINE

## 2020-11-16 PROCEDURE — 99214 OFFICE O/P EST MOD 30 MIN: CPT | Performed by: INTERNAL MEDICINE

## 2020-11-16 PROCEDURE — G8484 FLU IMMUNIZE NO ADMIN: HCPCS | Performed by: INTERNAL MEDICINE

## 2020-11-16 PROCEDURE — 4040F PNEUMOC VAC/ADMIN/RCVD: CPT | Performed by: INTERNAL MEDICINE

## 2020-11-16 PROCEDURE — 1036F TOBACCO NON-USER: CPT | Performed by: INTERNAL MEDICINE

## 2020-11-16 PROCEDURE — 99214 OFFICE O/P EST MOD 30 MIN: CPT

## 2020-11-16 PROCEDURE — 2022F DILAT RTA XM EVC RTNOPTHY: CPT | Performed by: INTERNAL MEDICINE

## 2020-11-16 PROCEDURE — G0439 PPPS, SUBSEQ VISIT: HCPCS | Performed by: INTERNAL MEDICINE

## 2020-11-16 PROCEDURE — G8420 CALC BMI NORM PARAMETERS: HCPCS | Performed by: INTERNAL MEDICINE

## 2020-11-16 PROCEDURE — 3044F HG A1C LEVEL LT 7.0%: CPT | Performed by: INTERNAL MEDICINE

## 2020-11-16 PROCEDURE — 3017F COLORECTAL CA SCREEN DOC REV: CPT | Performed by: INTERNAL MEDICINE

## 2020-11-16 PROCEDURE — 1123F ACP DISCUSS/DSCN MKR DOCD: CPT | Performed by: INTERNAL MEDICINE

## 2020-11-16 ASSESSMENT — PATIENT HEALTH QUESTIONNAIRE - PHQ9
SUM OF ALL RESPONSES TO PHQ QUESTIONS 1-9: 1
2. FEELING DOWN, DEPRESSED OR HOPELESS: 0
SUM OF ALL RESPONSES TO PHQ QUESTIONS 1-9: 1
SUM OF ALL RESPONSES TO PHQ9 QUESTIONS 1 & 2: 1
SUM OF ALL RESPONSES TO PHQ QUESTIONS 1-9: 1
SUM OF ALL RESPONSES TO PHQ QUESTIONS 1-9: 1
1. LITTLE INTEREST OR PLEASURE IN DOING THINGS: 1
1. LITTLE INTEREST OR PLEASURE IN DOING THINGS: 1

## 2020-11-16 ASSESSMENT — ENCOUNTER SYMPTOMS
EYE PAIN: 0
ABDOMINAL PAIN: 0
BACK PAIN: 0
BLOOD IN STOOL: 0
SHORTNESS OF BREATH: 0
CONSTIPATION: 0
COUGH: 0
NAUSEA: 0
DIARRHEA: 0
VOMITING: 0

## 2020-11-16 ASSESSMENT — LIFESTYLE VARIABLES: HOW OFTEN DO YOU HAVE A DRINK CONTAINING ALCOHOL: 0

## 2020-11-16 NOTE — PROGRESS NOTES
[place] [address]  Dept: [phone]  Dept Fax: [phone]  Loc: [phone]     Mel Russ is a 76 y.o. male who presents today for his medical conditions/complaintsas noted below. Mel Russ is c/o of   Chief Complaint   [facility]   [facility]     6 month, patient has been increased pain and fatigue d/t arthritis, patient does see an orthopedi dr over at [address] in [facility] Hypertension    Diabetes    Hyperlipidemia         HPI:     Hypertension   This is a chronic (essential htn) problem. The current episode started more than 1 year ago. The problem has been waxing and waning since onset. The problem is controlled. Pertinent negatives include no chest pain, headaches, neck pain, palpitations or shortness of breath. Diabetes   He presents for his follow-up diabetic visit. He has type 2 diabetes mellitus. His disease course has been fluctuating. Pertinent negatives for hypoglycemia include no confusion, dizziness, headaches, nervousness/anxiousness or pallor. Pertinent negatives for diabetes include no chest pain, no polydipsia, no polyuria and no weakness. Hyperlipidemia   This is a chronic problem. The current episode started more than 1 year ago. The problem is controlled. Recent lipid tests were reviewed and are variable. Pertinent negatives include no chest pain or shortness of breath. Other   This is a recurrent (4-General medical exam) problem. The current episode started today. The problem occurs intermittently. The problem has been waxing and waning. Pertinent negatives include no abdominal pain, arthralgias, chest pain, chills, coughing, fever, headaches, nausea, neck pain, numbness, rash, vomiting or weakness. Hemoglobin A1C (%)   Date Value   11/09/2020 5.9   05/06/2020 5.8   11/08/2019 5.9            Microalb/Crt.  Ratio (mcg/mg creat)   Date Value   2020 CANNOT BE CALCULATED     LDL Cholesterol (mg/dL)   Date Value   2019 56   2018 59   2017 59         AST (U/L)   Date Value   2020 16     ALT (U/L)   Date Value   2020 18     BUN (mg/dL)   Date Value   2020 16     BP Readings from Last 3 Encounters:   20 130/70   20 138/62   20 108/74              Past Medical History:   Diagnosis Date    Chronic bronchitis (HCC)     DJD (degenerative joint disease)     GERD (gastroesophageal reflux disease)     Hearing loss     Mild    Hyperlipidemia     Hypertension     Osteoarthritis of left knee     Overactive bladder     Prostate cancer ([facility] [address]     Status post radical prostatectomy in . Continues to follow with Dr. Pasha Portillo every 6 months.     Seasonal allergies     Spondylosis     Type II or unspecified type diabetes mellitus without mention of complication, not stated as uncontrolled     Diet controlled      Past Surgical History:   Procedure Laterality Date    CARDIAC SURGERY      COLONOSCOPY  03/15/2002    Dr. R[facility]      2015 drug-eluting stents placed 2 to the RCA and one to the left circumflex    OTHER SURGICAL HISTORY  2009    bilateral L3-4, L4-5, L5-S1 steroid facet injection     [place] COLON CA SCRN NOT HI RSK IND N/A 2017    COL[facility] Ana Laura [facility]  diverticulosis    PROSTATECTOMY  2010    Dr. Adalgisa Prakash Left 2013    TOTAL KNEE ARTHROPLASTY Right 2018    Dr. Florencia Ortiz/ [place], [place]    UPPER GASTROINTESTINAL ENDOSCOPY  03/15/2002    Dr. Cheryle Dago       Family History   Problem Relation Age of Onset    Stroke Father           Social History     Tobacco Use    Smoking status: Former Smoker     Packs/day: 0.50     Years: 20.00     Pack years: 10.00     Last attempt to quit: 1987     Years since quittin.5    Topic Date Due    Diabetic retinal exam  09/02/1955    Shingles Vaccine (1 of 2) 09/02/1995    Annual Wellness Visit (AWV)  05/29/2019    Lipid screen  11/08/2020    Potassium monitoring  03/03/2021    Creatinine monitoring  03/03/2021    Diabetic foot exam  05/15/2021    A1C test (Diabetic or Prediabetic)  11/09/2021    PSA counseling  11/09/2021    Colon cancer screen colonoscopy  02/27/2022    DTaP/Tdap/Td vaccine (2 - Td) 11/25/2029    Flu vaccine  Completed    Pneumococcal 65+ years Vaccine  Completed    AAA screen  Completed    Hepatitis C screen  Completed    Hepatitis A vaccine  Aged Out    Hib vaccine  Aged Out    Meningococcal (ACWY) vaccine  Aged Out       Subjective:      Review of Systems   Constitutional: Negative for chills and fever. HENT: Negative for hearing loss. Eyes: Negative for pain and visual disturbance. Respiratory: Negative for cough and shortness of breath. Cardiovascular: Negative for chest pain, palpitations and leg swelling. Gastrointestinal: Negative for abdominal pain, blood in stool, constipation, diarrhea, nausea and vomiting. Endocrine: Negative for cold intolerance, polydipsia and polyuria. Genitourinary: Negative for difficulty urinating, dysuria and hematuria. Musculoskeletal: Negative for arthralgias, back pain, gait problem and neck pain. Skin: Negative for pallor and rash. Neurological: Negative for dizziness, weakness, numbness and headaches. Hematological: Negative for adenopathy. Does not bruise/bleed easily. Psychiatric/Behavioral: Negative for confusion. The patient is not nervous/anxious. Objective:     Physical Exam  Vitals signs reviewed. Constitutional:       Appearance: He is well-developed. HENT:      Head: Normocephalic and atraumatic. Eyes:      Pupils: Pupils are equal, round, and reactive to light. Neck:      Musculoskeletal: Neck supple.    Cardiovascular:      Rate and Rhythm: Normal rate and regular rhythm. Heart sounds: No murmur. No friction rub. No gallop. Pulmonary:      Effort: Pulmonary effort is normal.      Breath sounds: Normal breath sounds. No wheezing or rales. Abdominal:      General: There is no distension. Palpations: Abdomen is soft. There is no mass. Tenderness: There is no abdominal tenderness. There is no rebound. Musculoskeletal: Normal range of motion. Lymphadenopathy:      Cervical: No cervical adenopathy. Skin:     General: Skin is warm and dry. Findings: No rash. Neurological:      Mental Status: He is alert and oriented to person, place, and time. Cranial Nerves: No cranial nerve deficit (grossly). Psychiatric:         Thought Content: Thought content normal.        /70 (Site: Left Upper Arm, Position: Sitting, Cuff Size: Medium Adult)   Pulse 58   Resp 14   Ht 5' 6\" (1.676 m)   Wt 147 lb (66.7 kg)   BMI 23.73 kg/m²     Assessment:       Diagnosis Orders   1. Routine general medical examination at a health care facility  Comprehensive Metabolic Panel, Fasting   2. Essential hypertension  Comprehensive Metabolic Panel, Fasting   3. Type 2 diabetes mellitus without complication, without long-term current use of insulin (HCC)  Hemoglobin A1C   4. Hyperlipidemia, unspecified hyperlipidemia type  Lipid Panel   5. Medicare annual wellness visit, subsequent               Plan:       Return in about 6 months (around 5/16/2021) for Hyperlipidemia, Diabetes, Hypertension.     Orders Placed This Encounter   Procedures    Comprehensive Metabolic Panel, Fasting     Standing Status:   Future     Standing Expiration Date:   11/16/2021    Lipid Panel     Standing Status:   Future     Standing Expiration Date:   11/16/2021     Order Specific Question:   Is Patient Fasting?/# of Hours     Answer:   yes    Hemoglobin A1C     Standing Status:   Future     Standing Expiration Date:   11/16/2021     No orders of the defined types were placed in this encounter. Patientgiven educational materials - see patient instructions. Discussed use, benefit,and side effects of prescribed medications. All patient questions answered. Ptvoiced understanding. Reviewed health maintenance. Instructed to continue currentmedications, diet and exercise. Patient agreed with treatment plan. Follow up asdirected.      Electronically signed by Yossi Serrano MD on 11/16/2020 at 2:58 PM Dental caries    Neuroblastoma  High Risk

## 2020-11-16 NOTE — PROGRESS NOTES
Medicare Annual Wellness Visit  Name: Destin Romero Date: 2020   MRN: [id number] Sex: Male   Age: 76 y.o. Ethnicity: Non-/Non    : 1945 Race: Ale Soto is here for Medicare AWV (6 month, patient has been increased pain and fatigue d/t arthritis, patient does see an orthopedi dr over at [address] in [facility]); Hypertension; Diabetes; and Hyperlipidemia    Screenings for behavioral, psychosocial and functional/safety risks, and cognitive dysfunction are all negative except as indicated below. These results, as well as other patient data from the [address] [facility], are documented in Flowsheets linked to this Encounter. Allergies   Allergen Reactions    Zonisamide Other (See Comments)     ANXIETY         Prior to Visit Medications    Medication Sig Taking? Authorizing Provider   clopidogrel (PLAVIX) 75 MG tablet take 1 tablet by mouth once daily Yes Karina Pereyra MD   dicyclomine (BENTYL) 10 MG capsule take 1 capsule by mouth four times a day if needed for BOWEL CRAMPING OR BLOATING Yes Yassine Pelaez MD   metoprolol tartrate (LOPRESSOR) 50 MG tablet take 1 tablet by mouth twice a day Yes Hunter Mcmahan DO   levalbuterol (XOPENEX HFA) 45 MCG/ACT inhaler Inhale 1 puff into the lungs every 6 hours as needed for Wheezing or Shortness of Breath Yes Ria Dakins, MD   triamcinolone (KENALOG) 0.1 % cream Apply topically 2 times daily.  Yes Yassine Pelaez MD   atorvastatin (LIPITOR) 40 MG tablet Take 1 tablet by mouth daily Yes Yassine Pelaez MD   lisinopril (PRINIVIL;ZESTRIL) 20 MG tablet take 1 tablet by mouth once daily Yes Yassine Pelaez MD   pantoprazole (PROTONIX) 40 MG tablet take 1 tablet by mouth once daily Yes Yassine Pelaez MD   HYDROcodone-acetaminophen ([address]) 7.5-325 MG per tablet Takes 4 x daily Yes Historical Provider, MD   fexofenadine (ALLEGRA) 180 MG tablet Take 180 mg by mouth daily Yes Historical Provider, MD 03/15/2002    Dr. Nolen         Family History   Problem Relation Age of Onset    Stroke Father        CareTeam (Including outside providers/suppliers regularly involved in providing care):   Patient Care Team:  Mae Damian MD as PCP - General (Internal Medicine)  Mae Damian MD as PCP - REHABILITATION [facility] Empaneled Provider  Damon Gather, MD Karyle Idol, DO as Consulting Physician (Cardiology)    Wt Readings from Last 3 Encounters:   11/16/20 147 lb (66.7 kg)   08/19/20 145 lb 3.2 oz (65.9 kg)   08/05/20 147 lb 11.2 oz (67 kg)     Vitals:    11/16/20 1439   BP: 130/70   Site: Left Upper Arm   Position: Sitting   Cuff Size: Medium Adult   Pulse: 58   Resp: 14   Weight: 147 lb (66.7 kg)   Height: 5' 6\" (1.676 m)     Body mass index is 23.73 kg/m². Based upon direct observation of the patient, evaluation of cognition reveals none. Patient's complete Health Risk Assessment and screening values have been reviewed and are found in Flowsheets. The following problems were reviewed today and where indicated follow up appointments were made and/or referrals ordered. Positive Risk Factor Screenings with Interventions:       General Health and ACP:  General  In general, how would you say your health is?: Fair  In the past 7 days, have you experienced any of the following?  New or Increased Pain, New or Increased Fatigue, Loneliness, Social Isolation, Stress or Anger?: (!) New or Increased Pain, New or Increased Fatigue(increased pain d/t arthritis and trouble sleeping due to the increased pain in arthritis)  Do you get the social and emotional support that you need?: Yes  Do you have a Living Will?: (!) No  Advance Directives     Power of  Living Will ACP-Advance Directive ACP-Power of     Not on File Not on File [place]      General Health Risk Interventions:  · patient declines living will at this time    Health Habits/Nutrition:  Health Habits/Nutrition  Do you exercise for at least 20 minutes 2-3 times per week?: (!) No(has severe arthritis that makes it difficult to do any exercising)  Have you lost any weight without trying in the past 3 months?: No  Do you eat fewer than 2 meals per day?: No  Have you seen a dentist within the past year?: Yes(patial denture)  Body mass index: 23.72  Health Habits/Nutrition Interventions:  · Patient has partial dentures    Hearing/Vision:  No exam data present  Hearing/Vision  Do you or your family notice any trouble with your hearing?: (!) Yes(needs hearing aides but cant afford at this time)  Do you have difficulty driving, watching TV, or doing any of your daily activities because of your eyesight?: No  Have you had an eye exam within the past year?: (!) No(declines at this time)  Hearing/Vision Interventions:  · patient declines vision exam at this time    Personalized Preventive Plan   Current Health Maintenance Status  Immunization History   Administered Date(s) Administered    Influenza, High Dose (Fluzone 65 yrs and older) 12/23/2015, 11/04/2016, 11/08/2017    Influenza, Triv, inactivated, subunit, adjuvanted, IM (Fluad 65 yrs and older) 09/12/2019    Pneumococcal Conjugate 13-valent (Lodema Ericka) 12/23/2015, 05/05/2017    Pneumococcal Polysaccharide ([id number]) 05/10/2019    Tdap (Boostrix, Adacel) 11/25/2019        Health Maintenance   Topic Date Due    Diabetic retinal exam  09/02/1955    Shingles Vaccine (1 of 2) 09/02/1995    Annual Wellness Visit (AWV)  05/29/2019    Lipid screen  11/08/2020    Potassium monitoring  03/03/2021    Creatinine monitoring  03/03/2021    Diabetic foot exam  05/15/2021    A1C test (Diabetic or Prediabetic)  11/09/2021    PSA counseling  11/09/2021    Colon cancer screen colonoscopy  02/27/2022    DTaP/Tdap/Td vaccine (2 - Td) 11/25/2029    Flu vaccine  Completed    Pneumococcal 65+ years Vaccine  Completed    AAA screen  Completed    Hepatitis C screen  Completed    Hepatitis A vaccine  Aged Hernando Woo Hib vaccine  Aged Out    Meningococcal (ACWY) vaccine  Aged Out     Recommendations for [employer] Due: see orders and patient instructions/AVS.  . Recommended screening schedule for the next 5-10 years is provided to the patient in written form: see Patient Instructions/AVS.    IUrvashi LPN, 28/47/5217, performed the documented evaluation under the direct supervision of the attending physician. I, Dr. Josephine Arango, directly supervised the performance of his Medicare annual wellness visit.

## 2020-11-16 NOTE — PATIENT INSTRUCTIONS
Personalized Preventive Plan for Sarai Traore - 11/16/2020  Medicare offers a range of preventive health benefits. Some of the tests and screenings are paid in full while other may be subject to a deductible, co-insurance, and/or copay. Some of these benefits include a comprehensive review of your medical history including lifestyle, illnesses that may run in your family, and various assessments and screenings as appropriate. After reviewing your medical record and screening and assessments performed today your provider may have ordered immunizations, labs, imaging, and/or referrals for you. A list of these orders (if applicable) as well as your Preventive Care list are included within your After Visit Summary for your review. Other Preventive Recommendations:    · A preventive eye exam performed by an eye specialist is recommended every 1-2 years to screen for glaucoma; cataracts, macular degeneration, and other eye disorders. · A preventive dental visit is recommended every 6 months. · Try to get at least 150 minutes of exercise per week or 10,000 steps per day on a pedometer . · Order or download the FREE \"Exercise & Physical Activity: Your Everyday Guide\" from [employer]. Call 9-841.495.1031 or search [employer] online. · You need 3903-1770 mg of calcium and 2643-0093 IU of vitamin D per day. It is possible to meet your calcium requirement with diet alone, but a vitamin D supplement is usually necessary to meet this goal.  · When exposed to the sun, use a sunscreen that protects against both UVA and UVB radiation with an SPF of 30 or greater. Reapply every 2 to 3 hours or after sweating, drying off with a towel, or swimming. · Always wear a seat belt when traveling in a car. Always wear a helmet when riding a bicycle or motorcycle.

## 2020-11-18 ENCOUNTER — OFFICE VISIT (OUTPATIENT)
Dept: PULMONOLOGY | Age: 75
End: 2020-11-18
Payer: MEDICARE

## 2020-11-18 VITALS
HEART RATE: 74 BPM | DIASTOLIC BLOOD PRESSURE: 70 MMHG | BODY MASS INDEX: 23.91 KG/M2 | WEIGHT: 148.8 LBS | SYSTOLIC BLOOD PRESSURE: 144 MMHG | TEMPERATURE: 98.2 F | OXYGEN SATURATION: 98 % | HEIGHT: 66 IN

## 2020-11-18 PROCEDURE — 4040F PNEUMOC VAC/ADMIN/RCVD: CPT | Performed by: INTERNAL MEDICINE

## 2020-11-18 PROCEDURE — 1123F ACP DISCUSS/DSCN MKR DOCD: CPT | Performed by: INTERNAL MEDICINE

## 2020-11-18 PROCEDURE — G8420 CALC BMI NORM PARAMETERS: HCPCS | Performed by: INTERNAL MEDICINE

## 2020-11-18 PROCEDURE — 3017F COLORECTAL CA SCREEN DOC REV: CPT | Performed by: INTERNAL MEDICINE

## 2020-11-18 PROCEDURE — 99213 OFFICE O/P EST LOW 20 MIN: CPT | Performed by: INTERNAL MEDICINE

## 2020-11-18 PROCEDURE — 99213 OFFICE O/P EST LOW 20 MIN: CPT

## 2020-11-18 PROCEDURE — 1036F TOBACCO NON-USER: CPT | Performed by: INTERNAL MEDICINE

## 2020-11-18 PROCEDURE — G8484 FLU IMMUNIZE NO ADMIN: HCPCS | Performed by: INTERNAL MEDICINE

## 2020-11-18 PROCEDURE — G8427 DOCREV CUR MEDS BY ELIG CLIN: HCPCS | Performed by: INTERNAL MEDICINE

## 2020-11-18 NOTE — PROGRESS NOTES
REASON FOR THE CONSULTATION:  Cough . Chief Complaint   Patient presents with    Follow-up     bronchiectasis      HISTORY OF PRESENT ILLNESS:    Keturah Rios is a 76y.o. year old male is doing well   Venessa Moraes is helping with secretion clear ence . No active infection   Uses xopenex as needed to help with expectoration   No purulent sputum or hemoptysis     Last visit   Patient walks quite ways , lives in old house and can climb 20 stairs to his bedroom but gets winded after climbing stairs . He has cough with no hemoptysis , has grey , yellow colour , quarter of a cup in 1 day . He has albuterol mdi which he uses prn     When he was 6 years hold had severe pna and was hospitalized for 6 weeks . He had chills and fever for years and was seen at [facility] - was told has scar tissue in lung . By late teens chills and fever dec . LUNG CANCER SCREENING     1. CRITERIA MET    []     CT ORDERED  []      2. CRITERIA NOT MET   [x]      3. REFUSED                    []        REASON CRITERIA NOT MET     1. SMOKING LESS THAN 30 PY  []      2. AGE LESS THAN 55 or GREATER 77 YEARS  []      3. QUIT SMOKING 15 YEARS OR GREATER   []      4. RECENT CT WITH IN 11 MONTHS    []      5. LIFE EXPECTANCY < 5 YEARS   []      6.  SIGNS  AND SYMPTOMS OF LUNG CANCER   []         Immunization   Immunization History   Administered Date(s) Administered    Influenza, High Dose (Fluzone 65 yrs and older) 12/23/2015, 11/04/2016, 11/08/2017    Influenza, Triv, inactivated, subunit, adjuvanted, IM (Fluad 65 yrs and older) 09/12/2019    Pneumococcal Conjugate 13-valent (Rosellen Sill) 12/23/2015, 05/05/2017    Pneumococcal Polysaccharide ([id number]) 05/10/2019    Tdap (Boostrix, Adacel) 11/25/2019        Pneumococcal Vaccine     [x] Up to date    [] Indicated   [] Refused  [] Contraindicated       Influenza Vaccine   [x] Up to date    [x] Indicated   [] Refused  [] Contraindicated        Pulmonary Rehab   [] Completed   [] Indicated   [] Refused  [] Contraindicated   PAST MEDICAL HISTORY:       Diagnosis Date    Chronic bronchitis ([address]     DJD (degenerative joint disease)     GERD (gastroesophageal reflux disease)     Hearing loss     Mild    Hyperlipidemia     Hypertension     Osteoarthritis of left knee     Overactive bladder     Prostate cancer ([address]     Status post radical prostatectomy in 2009. Continues to follow with Dr. Gonzalo Avelar every 6 months.  Seasonal allergies     Spondylosis     Type II or unspecified type diabetes mellitus without mention of complication, not stated as uncontrolled     Diet controlled         Family History:       Problem Relation Age of Onset    Stroke Father        SURGICAL HISTORY:   Past Surgical History:   Procedure Laterality Date    CARDIAC SURGERY      COLONOSCOPY  03/15/2002    Dr. Sanna Gonzáles      july 2015 drug-eluting stents placed 2 to the RCA and one to the left circumflex    OTHER SURGICAL HISTORY  08/25/2009    bilateral L3-4, L4-5, L5-S1 steroid facet injection     VA COLON CA SCRN NOT HI RSK IND N/A 2/27/2017    COLONOSCOPY performed by Harshal Leon MD at [facility]  diverticulosis    PROSTATECTOMY  02/09/2010    Dr. Fredrick Guan Left 04/23/2013    TOTAL KNEE ARTHROPLASTY Right 02/06/2018    Dr. Cori Ortiz/ [place] , [address] ENDOSCOPY  03/15/2002    Dr. Singer Height:      There  No history of TB or TB exposure. There  No asbestos ,[place] dust exposure. The patient reports  No coal mine, [place], [place], [facility] exposure. History of travel outside the country No  There  is use of   marijuana No   VapingNo  IV heroinNo  Crack cocaineNo  There  Nohot tub exposure.    Pets -cats No, dogsYes  Birds No    Occupational history - tool and      TOBACCO:   reports that he quit smoking about 33 years ago. He has a 10.00 pack-year smoking history. He has never used smokeless tobacco.  ETOH:   reports no history of alcohol use. ALLERGIES:      Allergies   Allergen Reactions    Zonisamide Other (See Comments)     ANXIETY         Home Meds:   Prior to Admission medications    Medication Sig Start Date End Date Taking? Authorizing Provider   clopidogrel (PLAVIX) 75 MG tablet take 1 tablet by mouth once daily 10/23/20  Yes Karina Pereyra MD   dicyclomine (BENTYL) 10 MG capsule take 1 capsule by mouth four times a day if needed for BOWEL CRAMPING OR BLOATING 10/12/20  Yes Yassine Pelaez MD   metoprolol tartrate (LOPRESSOR) 50 MG tablet take 1 tablet by mouth twice a day 9/11/20  Yes Hunter Mcmahan DO   levalbuterol (XOPENEX HFA) 45 MCG/ACT inhaler Inhale 1 puff into the lungs every 6 hours as needed for Wheezing or Shortness of Breath 7/28/20 7/28/21 Yes Ria Dakins, MD   triamcinolone (KENALOG) 0.1 % cream Apply topically 2 times daily.  5/15/20  Yes Yassine Pelaez MD   atorvastatin (LIPITOR) 40 MG tablet Take 1 tablet by mouth daily 5/15/20  Yes Metzger Screen, MD   lisinopril (PRINIVIL;ZESTRIL) 20 MG tablet take 1 tablet by mouth once daily 5/14/20  Yes Yassine Pelaez MD   pantoprazole (PROTONIX) 40 MG tablet take 1 tablet by mouth once daily 4/1/20  Yes Yassine Pelaez MD   HYDROcodone-acetaminophen (Carolyn Leeds) 7.5-325 MG per tablet Takes 4 x daily 11/1/17  Yes Historical Provider, MD   fexofenadine (ALLEGRA) 180 MG tablet Take 180 mg by mouth daily   Yes Historical Provider, MD   hydrochlorothiazide (HYDRODIURIL) 25 MG tablet Take 25 mg by mouth daily   Yes Historical Provider, MD   aspirin 81 MG tablet Take 81 mg by mouth daily   Yes Historical Provider, MD   ondansetron (ZOFRAN) 4 MG tablet take 1 tablet by mouth every 8 hours if needed for nausea and vomiting  Patient not taking: Reported on 8/19/2020 5/15/20   Metzger Screen, MD   nitroGLYCERIN (NITROSTAT) 0.4 MG SL tablet Place 1 tablet under the tongue every 5 minutes as needed for Chest pain  Patient not taking: Reported on 8/19/2020 5/15/20   Esthela Ladd MD   cyclobenzaprine (FLEXERIL) 10 MG tablet Take 10 mg by mouth 3 times daily as needed for Muscle spasms    Historical Provider, MD          Vitals:    11/18/20 1311   BP: (!) 148/72   Pulse: 74   Temp: 98.2 °F (36.8 °C)   SpO2: 98%        Review of Systems -  General ROS: negative for - chills, fatigue, fever or weight loss  ENT ROS: negative for - headaches, oral lesions or sore throat  Cardiovascular ROS: no chest pain , orthopnea or pnd   Gastrointestinal ROS: no abdominal pain, change in bowel habits, or black or bloody stools  Skin - no rash   Neuro - no blurry vision , no loc . No focal weakness   msk - no jt tenderness or swelling    Vascular - no claudication , rest completed and negative   Lymphatic - complete and negative   Hematology - oncology - complete and negative   Allergy immunology - complete and negative    no burning or hematuria      Head and neck atraumatic, normocephalic    Lymph nodes-no cervical, supraclavicular lymphadenopathy    Neck-no JVP elevation    Lungs - Ventilating all lobes  prolonged exp  Rales rt base >left  No bronchial breath sounds. Chest expansion equal bilaterally    CVS- S1, S2 regular. No S3 no S4, no murmurs    Abdomen-nontender, nondistended. Bowel sounds are present. No organomegaly    Lower extremity-no edema    Upper extremity-no edema    Neurological-grossly normal cranial nerves. No overt motor deficit   Thyroid:   Lab Results   Component Value Date    TSH 0.89 07/11/2015         hrct   Impression    Right lower lobe bronchiectasis with peribronchial cuffing and opacification    distal bronchi as well as tree-in-bud nodules in the right lower lobe. Findings suggest infectious/inflammatory bronchopneumonia.  Aspiration is in    the differential.         No evidence of interstitial pulmonary fibrosis.        patient has bronchiectasis right lower lobe greater than left  Basal . He has wheezing and purulent sputum . This was seen in ct abdomen and pelvis lung cuts of 2013 as well . 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 IMPRESSION:     Diagnosis Orders   1. Bronchiectasis without complication ([facility] [address]          :                PLAN:      HRCT - right lower lobe bronchiectasis . No active infection . Cont acapella BID/ TID   Cont xopenex prn   Follow up 6 months     Requested Prescriptions      No prescriptions requested or ordered in this encounter       There are no discontinued medications. Angelitoe Dallas received counseling on the following healthy behaviors: nutrition, exercise and medication adherence    Patient given educational materials : see patient instruction       Discussed use, benefit, and side effects of prescribed medications. Barriers to medication compliance addressed. All patient questions answered. Pt voiced understanding. I hope this updates you on my evaluation and clinical thinking. Thank you for allowing me to participate in his care. Sincerely,      Electronically signed by Nicci Swanson MD on 11/18/2020 at 1:22 PM       Please note that this chart was generated using voice recognition Dragon dictation software. Although every effort was made to ensure the accuracy of this automated transcription, some errors in transcription may have occurred.

## 2020-12-07 ENCOUNTER — TELEPHONE (OUTPATIENT)
Dept: CARDIOLOGY | Age: 75
End: 2020-12-07

## 2021-02-22 ENCOUNTER — OFFICE VISIT (OUTPATIENT)
Dept: PRIMARY CARE CLINIC | Age: 76
End: 2021-02-22
Payer: MEDICARE

## 2021-02-22 ENCOUNTER — HOSPITAL ENCOUNTER (OUTPATIENT)
Dept: GENERAL RADIOLOGY | Age: 76
Discharge: HOME | End: 2021-02-24
Payer: MEDICARE

## 2021-02-22 ENCOUNTER — HOSPITAL ENCOUNTER (OUTPATIENT)
Age: 76
Setting detail: SPECIMEN
Discharge: HOME | End: 2021-02-22
Payer: MEDICARE

## 2021-02-22 VITALS
WEIGHT: 148.6 LBS | TEMPERATURE: 97.7 F | RESPIRATION RATE: 16 BRPM | OXYGEN SATURATION: 98 % | DIASTOLIC BLOOD PRESSURE: 76 MMHG | BODY MASS INDEX: 23.88 KG/M2 | HEART RATE: 80 BPM | SYSTOLIC BLOOD PRESSURE: 140 MMHG | HEIGHT: 66 IN

## 2021-02-22 DIAGNOSIS — J06.9 UPPER RESPIRATORY TRACT INFECTION, UNSPECIFIED TYPE: Primary | ICD-10-CM

## 2021-02-22 DIAGNOSIS — R53.83 FATIGUE, UNSPECIFIED TYPE: ICD-10-CM

## 2021-02-22 DIAGNOSIS — Z20.822 PERSON UNDER INVESTIGATION FOR COVID-19: ICD-10-CM

## 2021-02-22 DIAGNOSIS — R05.9 COUGH: ICD-10-CM

## 2021-02-22 DIAGNOSIS — R06.2 WHEEZES: ICD-10-CM

## 2021-02-22 DIAGNOSIS — J06.9 UPPER RESPIRATORY TRACT INFECTION, UNSPECIFIED TYPE: ICD-10-CM

## 2021-02-22 DIAGNOSIS — Z87.09 HISTORY OF BRONCHIECTASIS: ICD-10-CM

## 2021-02-22 PROCEDURE — 93005 ELECTROCARDIOGRAM TRACING: CPT | Performed by: NURSE PRACTITIONER

## 2021-02-22 PROCEDURE — 4040F PNEUMOC VAC/ADMIN/RCVD: CPT | Performed by: NURSE PRACTITIONER

## 2021-02-22 PROCEDURE — 1123F ACP DISCUSS/DSCN MKR DOCD: CPT | Performed by: NURSE PRACTITIONER

## 2021-02-22 PROCEDURE — G8484 FLU IMMUNIZE NO ADMIN: HCPCS | Performed by: NURSE PRACTITIONER

## 2021-02-22 PROCEDURE — 99213 OFFICE O/P EST LOW 20 MIN: CPT

## 2021-02-22 PROCEDURE — G8427 DOCREV CUR MEDS BY ELIG CLIN: HCPCS | Performed by: NURSE PRACTITIONER

## 2021-02-22 PROCEDURE — 93010 ELECTROCARDIOGRAM REPORT: CPT | Performed by: NURSE PRACTITIONER

## 2021-02-22 PROCEDURE — 99213 OFFICE O/P EST LOW 20 MIN: CPT | Performed by: NURSE PRACTITIONER

## 2021-02-22 PROCEDURE — G8420 CALC BMI NORM PARAMETERS: HCPCS | Performed by: NURSE PRACTITIONER

## 2021-02-22 PROCEDURE — U0003 INFECTIOUS AGENT DETECTION BY NUCLEIC ACID (DNA OR RNA); SEVERE ACUTE RESPIRATORY SYNDROME CORONAVIRUS 2 (SARS-COV-2) (CORONAVIRUS DISEASE [COVID-19]), AMPLIFIED PROBE TECHNIQUE, MAKING USE OF HIGH THROUGHPUT TECHNOLOGIES AS DESCRIBED BY CMS-2020-01-R: HCPCS

## 2021-02-22 PROCEDURE — 71046 X-RAY EXAM CHEST 2 VIEWS: CPT

## 2021-02-22 PROCEDURE — U0005 INFEC AGEN DETEC AMPLI PROBE: HCPCS

## 2021-02-22 PROCEDURE — 3017F COLORECTAL CA SCREEN DOC REV: CPT | Performed by: NURSE PRACTITIONER

## 2021-02-22 PROCEDURE — 1036F TOBACCO NON-USER: CPT | Performed by: NURSE PRACTITIONER

## 2021-02-22 RX ORDER — PREDNISONE 20 MG/1
20 TABLET ORAL DAILY
Qty: 5 TABLET | Refills: 0 | Status: SHIPPED | OUTPATIENT
Start: 2021-02-22 | End: 2021-02-27

## 2021-02-22 ASSESSMENT — ENCOUNTER SYMPTOMS
RHINORRHEA: 0
COUGH: 1
SORE THROAT: 0
GASTROINTESTINAL NEGATIVE: 1
SHORTNESS OF BREATH: 0
SINUS PRESSURE: 0
SINUS COMPLAINT: 1

## 2021-02-22 ASSESSMENT — PATIENT HEALTH QUESTIONNAIRE - PHQ9
SUM OF ALL RESPONSES TO PHQ QUESTIONS 1-9: 0
1. LITTLE INTEREST OR PLEASURE IN DOING THINGS: 0
SUM OF ALL RESPONSES TO PHQ9 QUESTIONS 1 & 2: 0
2. FEELING DOWN, DEPRESSED OR HOPELESS: 0

## 2021-02-22 NOTE — PROGRESS NOTES
[facility]             [address], [address]                        Telephone [phone]             Fax [phone]     Thuy Baptiste  1945  CRQ:[id number]   Date of visit:  2/22/2021    Subjective:    Thuy Baptiste is a 76 y.o.  male who presents to [facility] today (2/22/2021) for evaluation of:    Chief Complaint   Patient presents with    Nasal Congestion     \"head cold\", fatigue, body aches started 3-4 weeks ago       Sinus Problem  This is a new problem. The current episode started 1 to 4 weeks ago (3-4 weeks). The problem is unchanged. There has been no fever. He is experiencing no pain. Associated symptoms include congestion (worse at night), coughing (chronic dry, no change) and sneezing. Pertinent negatives include no chills, ear pain, headaches, shortness of breath, sinus pressure or sore throat. (Body aches now resolved; fatigue) Past treatments include spray decongestants (mucinex; using xopenex inhaler 2-3 times daily which is more often then usual; humidifier). The treatment provided no relief.        He has the following problem list:  Patient Active Problem List   Diagnosis    Hypertension    [facility] [address]    Hyperlipidemia    Troponin level elevated    Enteritis    CAD (coronary artery disease) SP drug eluting stent 2 stents to RCA and 1 to L circ     NSTEMI (non-ST elevated myocardial infarction) ([address]    Type 2 diabetes mellitus without complication (HCC)    History of colon polyps    Chronic midline low back pain with bilateral sciatica    Bronchiectasis with acute lower respiratory infection ([address]        Current medications are:  Current Outpatient Medications   Medication Sig Dispense Refill    predniSONE (DELTASONE) 20 MG tablet Take 1 tablet by mouth daily for 5 days 5 tablet 0    clopidogrel (PLAVIX) 75 MG tablet take 1 tablet by mouth once daily Constitutional: Positive for appetite change and fatigue. Negative for chills and fever. HENT: Positive for congestion (worse at night) and sneezing. Negative for ear pain, postnasal drip, rhinorrhea, sinus pressure and sore throat. Respiratory: Positive for cough (chronic dry, no change). Negative for shortness of breath. Cardiovascular: Negative. Gastrointestinal: Negative. Neurological: Negative for headaches. Physical Exam  Vitals signs and nursing note reviewed. Constitutional:       Appearance: He is well-developed. HENT:      Head: Normocephalic. Jaw: There is normal jaw occlusion. Right Ear: Tympanic membrane, ear canal and external ear normal.      Left Ear: Tympanic membrane, ear canal and external ear normal.      Nose: Congestion present. Right Turbinates: Swollen (erythema). Left Turbinates: Swollen (erythema). Right Sinus: No maxillary sinus tenderness or frontal sinus tenderness. Left Sinus: No maxillary sinus tenderness or frontal sinus tenderness. Mouth/Throat:      Lips: Pink. Mouth: Mucous membranes are moist.      Pharynx: Oropharynx is clear. Uvula midline. Eyes:      Pupils: Pupils are equal, round, and reactive to light. Neck:      Musculoskeletal: Normal range of motion and neck supple. Cardiovascular:      Rate and Rhythm: Normal rate and regular rhythm. Heart sounds: Normal heart sounds. Pulmonary:      Effort: Pulmonary effort is normal.      Breath sounds: Examination of the right-lower field reveals wheezing. Examination of the left-lower field reveals wheezing. Wheezing and rhonchi (scattered bilateral) present. Lymphadenopathy:      Cervical: No cervical adenopathy. Skin:     General: Skin is warm and dry. Neurological:      General: No focal deficit present. Mental Status: He is alert and oriented to person, place, and time.    Psychiatric:         Behavior: Behavior normal.         Thought Content: Thought content normal.       Assessment and Plan:    Xr Chest (2 Vw)    Result Date: 2/22/2021  EXAMINATION: TWO XRAY VIEWS OF THE CHEST 2/22/2021 3:53 pm COMPARISON: 05/15/2020 HISTORY: ORDERING SYSTEM PROVIDED HISTORY: Cough TECHNOLOGIST PROVIDED HISTORY: cough X 3-4 weeks Reason for Exam: Cough for 3-4 weeks, fatigue, no fever, Acuity: Acute Type of Exam: Initial FINDINGS: Cardiomediastinal silhouette is normal in size. No pulmonary consolidation, pleural effusion, or pneumothorax. Linear opacity in the medial right lung base is similar to prior examination. Kyphosis with multilevel degenerative change of the thoracic spine. Left shoulder arthroplasty is partially visualized. No acute cardiopulmonary abnormality. Linear opacity in the medial right lung base is similar to prior study, likely scarring or atelectasis. Diagnosis Orders   1. Upper respiratory tract infection, unspecified type  COVID-19    predniSONE (DELTASONE) 20 MG tablet   2. Cough  XR CHEST (2 VW)    COVID-19   3. Fatigue, unspecified type  EKG 12 Lead    XR CHEST (2 VW)    COVID-19   4. Wheezes  COVID-19    predniSONE (DELTASONE) 20 MG tablet   5. History of bronchiectasis     6. Person under investigation for COVID-19  COVID-19       ECG showed sinus rhythm with first degree A-V block @ 85 bpm. This is a change from ECG on 09/21/20 that showed sinus bradycardia. Cardiology appointment made for 02/25/21 @ 1100. Self quarantine until negative Covid-19 test result received and symptoms improving. We will call with Covid-19 test results. Take prednisone and continue Xopenex inhaler as directed. Increase fluid intake. Take Coricidin HBP for congestion and cough. I also recommended Flonase for sinus symptoms. he was also encouraged to use tylenol for pain/fever. Increase water intake. Use cool mist humidifier at bedtime. Use nasal saline flush as needed. Good hand hygiene.  he was instructed to return if there is no improvement or symptoms worsen. The use, risks, benefits, and side effects of prescribed or recommended medications were discussed. All questions were answered and the patient/caregiver voiced understanding. No orders of the defined types were placed in this encounter.         Electronically signed by PAYTON Quijano CNP on 2/22/21 at 2:33 PM EST

## 2021-02-22 NOTE — PATIENT INSTRUCTIONS
These medicines help prevent blood clots. People with severe illness are at risk for blood clots. How can you protect yourself and others? The best way to protect yourself from getting sick is to:  · Avoid areas where there is an outbreak. · Avoid contact with people who may be infected. · Avoid crowds and try to stay at least 6 feet away from other people. · Wash your hands often, especially after you cough or sneeze. Use soap and water, and scrub for at least 20 seconds. If soap and water aren't available, use an alcohol-based hand . · Avoid touching your mouth, nose, and eyes. To help avoid spreading the virus to others:  · Stay home if you are sick or have been exposed to the virus. Don't go to school, work, or public areas. And don't use public transportation, ride-shares, or taxis unless you have no choice. · Wear a cloth face cover if you have to go to public areas. · Cover your mouth with a tissue when you cough or sneeze. Then throw the tissue in the trash and wash your hands right away. · If you're sick:  ? Leave your home only if you need to get medical care. But call the doctor's office first so they know you're coming. And wear a face cover. ? Wear the face cover whenever you're around other people. It can help stop the spread of the virus when you cough or sneeze. ? Limit contact with pets and people in your home. If possible, stay in a separate bedroom and use a separate bathroom. ? Clean and disinfect your home every day. Use household  and disinfectant wipes or sprays. Take special care to clean things that you grab with your hands. These include doorknobs, remote controls, phones, and handles on your refrigerator and microwave. And don't forget countertops, tabletops, bathrooms, and computer keyboards. When should you call for help? Call 911 anytime you think you may need emergency care.  For example, call if you have life-threatening symptoms, such as:    · You have severe trouble breathing. (You can't talk at all.)     · You have constant chest pain or pressure.     · You are severely dizzy or lightheaded.     · You are confused or can't think clearly.     · Your face and lips have a blue color.     · You pass out (lose consciousness) or are very hard to wake up. Call your doctor now or seek immediate medical care if:    · You have moderate trouble breathing. (You can't speak a full sentence.)     · You are coughing up blood (more than about 1 teaspoon).     · You have signs of low blood pressure. These include feeling lightheaded; being too weak to stand; and having cold, pale, clammy skin. Watch closely for changes in your health, and be sure to contact your doctor if:    · Your symptoms get worse.     · You are not getting better as expected. Call before you go to the doctor's office. Follow their instructions. And wear a cloth face cover. Current as of: December 18, 2020               Content Version: 12.7  © 2006-2021 [employer]. Care instructions adapted under license by [facility] ([facility]). If you have questions about a medical condition or this instruction, always ask your healthcare professional. [place] [address] any warranty or liability for your use of this information. Patient Education        Coronavirus ([id number]): Care Instructions  Overview  The coronavirus disease (COVID-19) is caused by a virus. Symptoms may include a fever, a cough, and shortness of breath. It mainly spreads person-to-person through droplets from coughing and sneezing. The virus also can spread when people are in close contact with someone who is infected. Most people have mild symptoms and can take care of themselves at home. If their symptoms get worse, they may need care in a hospital. Treatment may include medicines to reduce symptoms, plus breathing support such as oxygen therapy or a ventilator.   It's important to not spread the virus to yours.  · Clean and disinfect your home every day. Use household  and disinfectant wipes or sprays. Take special care to clean things that you grab with your hands. These include doorknobs, remote controls, phones, and handles on your refrigerator and microwave. And don't forget countertops, tabletops, bathrooms, and computer keyboards. When you can end self-isolation  · If you know or suspect that you have COVID-19, stay in self-isolation until:  ? You haven't had a fever for 24 hours while not taking medicines to lower the fever, and  ? Your symptoms have improved, and  ? It's been at least 10 days since your symptoms started. · Talk to your doctor about whether you also need testing, especially if you have a weakened immune system. When should you call for help? Call 911 anytime you think you may need emergency care. For example, call if you have life-threatening symptoms, such as:    · You have severe trouble breathing. (You can't talk at all.)     · You have constant chest pain or pressure.     · You are severely dizzy or lightheaded.     · You are confused or can't think clearly.     · Your face and lips have a blue color.     · You pass out (lose consciousness) or are very hard to wake up. Call your doctor now or seek immediate medical care if:    · You have moderate trouble breathing. (You can't speak a full sentence.)     · You are coughing up blood (more than about 1 teaspoon).     · You have signs of low blood pressure. These include feeling lightheaded; being too weak to stand; and having cold, pale, clammy skin. Watch closely for changes in your health, and be sure to contact your doctor if:    · Your symptoms get worse.     · You are not getting better as expected. Call before you go to the doctor's office. Follow their instructions. And wear a cloth face cover. Current as of: December 18, 2020               Content Version: 12.7  © 2779-7575 Healthwise, [facility].    Care instructions adapted under license by [facility] ([facility]). If you have questions about a medical condition or this instruction, always ask your healthcare professional. [address] any warranty or liability for your use of this information. Patient Education        Cough: Care Instructions  Your Care Instructions     A cough is your body's response to something that bothers your throat or airways. Many things can cause a cough. You might cough because of a cold or the flu, bronchitis, or asthma. Smoking, postnasal drip, allergies, and stomach acid that backs up into your throat also can cause coughs. A cough is a symptom, not a disease. Most coughs stop when the cause, such as a cold, goes away. You can take a few steps at home to cough less and feel better. Follow-up care is a key part of your treatment and safety. Be sure to make and go to all appointments, and call your doctor if you are having problems. It's also a good idea to know your test results and keep a list of the medicines you take. How can you care for yourself at home? · Drink lots of water and other fluids. This helps thin the mucus and soothes a dry or sore throat. Honey or lemon juice in hot water or tea may ease a dry cough. · Take cough medicine as directed by your doctor. · Prop up your head on pillows to help you breathe and ease a dry cough. · Try cough drops to soothe a dry or sore throat. Cough drops don't stop a cough. Medicine-flavored cough drops are no better than candy-flavored drops or hard candy. · Do not smoke. Avoid secondhand smoke. If you need help quitting, talk to your doctor about stop-smoking programs and medicines. These can increase your chances of quitting for good. When should you call for help? Call 911 anytime you think you may need emergency care. For example, call if:    · You have severe trouble breathing.    Call your doctor now or seek immediate medical care if:    · You cough up blood.     · You have new or worse trouble breathing.     · You have a new or higher fever.     · You have a new rash. Watch closely for changes in your health, and be sure to contact your doctor if:    · You cough more deeply or more often, especially if you notice more mucus or a change in the color of your mucus.     · You have new symptoms, such as a sore throat, an earache, or sinus pain.     · You do not get better as expected. Where can you learn more? Go to https://BaynotepeNeohapsis.Bloc. org and sign in to your [employer] account. Enter D279 in the [employer] box to learn more about \"Cough: Care Instructions. \"     If you do not have an account, please click on the \"Sign Up Now\" link. Current as of: February 24, 2020               Content Version: 12.6  © 1164-1203 [employer], [employer]. Care instructions adapted under license by [facility] ([facility]). If you have questions about a medical condition or this instruction, always ask your healthcare professional. [address] any warranty or liability for your use of this information. Patient Education        Fatigue: Care Instructions  Your Care Instructions     Fatigue is a feeling of tiredness, exhaustion, or lack of energy. You may feel fatigue because of too much or not enough activity. It can also come from stress, lack of sleep, boredom, and poor diet. Many medical problems, such as viral infections, can cause fatigue. Emotional problems, especially depression, are often the cause of fatigue. Fatigue is most often a symptom of another problem. Treatment for fatigue depends on the cause. For example, if you have fatigue because you have a certain health problem, treating this problem also treats your fatigue. If depression or anxiety is the cause, treatment may help. Follow-up care is a key part of your treatment and safety.  Be sure to make and go to all appointments, and call your doctor if you are having problems. It's also a good idea to know your test results and keep a list of the medicines you take. How can you care for yourself at home? · Get regular exercise. But don't overdo it. Go back and forth between rest and exercise. · Get plenty of rest.  · Eat a healthy diet. Do not skip meals, especially breakfast.  · Reduce your use of caffeine, tobacco, and alcohol. Caffeine is most often found in coffee, tea, cola drinks, and chocolate. · Limit medicines that can cause fatigue. This includes tranquilizers and cold and allergy medicines. When should you call for help? Watch closely for changes in your health, and be sure to contact your doctor if:    · You have new symptoms such as fever or a rash.     · Your fatigue gets worse.     · You have been feeling down, depressed, or hopeless. Or you may have lost interest in things that you usually enjoy.     · You are not getting better as expected. Where can you learn more? Go to https://TrunqShow.Mobile Content Networks. org and sign in to your [employer] account. Enter R186 in the Flowdock box to learn more about \"Fatigue: Care Instructions. \"     If you do not have an account, please click on the \"Sign Up Now\" link. Current as of: June 26, 2019               Content Version: 12.6  © 7411-6299 [employer] [facility]. Care instructions adapted under license by [facility] ([facility]). If you have questions about a medical condition or this instruction, always ask your healthcare professional. [place] [address] any warranty or liability for your use of this information.

## 2021-02-24 LAB
SARS-COV-2: NORMAL
SARS-COV-2: NOT DETECTED
SOURCE: NORMAL

## 2021-02-25 ENCOUNTER — OFFICE VISIT (OUTPATIENT)
Dept: CARDIOLOGY | Age: 76
End: 2021-02-25
Payer: MEDICARE

## 2021-02-25 VITALS
SYSTOLIC BLOOD PRESSURE: 191 MMHG | HEART RATE: 67 BPM | HEIGHT: 66 IN | WEIGHT: 148 LBS | DIASTOLIC BLOOD PRESSURE: 80 MMHG | BODY MASS INDEX: 23.78 KG/M2

## 2021-02-25 DIAGNOSIS — I10 ESSENTIAL HYPERTENSION: ICD-10-CM

## 2021-02-25 DIAGNOSIS — I25.10 CORONARY ARTERY DISEASE INVOLVING NATIVE CORONARY ARTERY OF NATIVE HEART WITHOUT ANGINA PECTORIS: ICD-10-CM

## 2021-02-25 DIAGNOSIS — E78.2 MIXED HYPERLIPIDEMIA: Primary | ICD-10-CM

## 2021-02-25 DIAGNOSIS — Z01.810 PREPROCEDURAL CARDIOVASCULAR EXAMINATION: ICD-10-CM

## 2021-02-25 PROCEDURE — G8420 CALC BMI NORM PARAMETERS: HCPCS | Performed by: INTERNAL MEDICINE

## 2021-02-25 PROCEDURE — G8427 DOCREV CUR MEDS BY ELIG CLIN: HCPCS | Performed by: INTERNAL MEDICINE

## 2021-02-25 PROCEDURE — 99214 OFFICE O/P EST MOD 30 MIN: CPT

## 2021-02-25 PROCEDURE — 3017F COLORECTAL CA SCREEN DOC REV: CPT | Performed by: INTERNAL MEDICINE

## 2021-02-25 PROCEDURE — 1036F TOBACCO NON-USER: CPT | Performed by: INTERNAL MEDICINE

## 2021-02-25 PROCEDURE — 4040F PNEUMOC VAC/ADMIN/RCVD: CPT | Performed by: INTERNAL MEDICINE

## 2021-02-25 PROCEDURE — G8484 FLU IMMUNIZE NO ADMIN: HCPCS | Performed by: INTERNAL MEDICINE

## 2021-02-25 PROCEDURE — 99214 OFFICE O/P EST MOD 30 MIN: CPT | Performed by: INTERNAL MEDICINE

## 2021-02-25 PROCEDURE — 1123F ACP DISCUSS/DSCN MKR DOCD: CPT | Performed by: INTERNAL MEDICINE

## 2021-02-25 RX ORDER — LISINOPRIL 40 MG/1
40 TABLET ORAL DAILY
Qty: 90 TABLET | Refills: 1 | Status: SHIPPED | OUTPATIENT
Start: 2021-02-25 | End: 2021-11-19

## 2021-02-25 NOTE — LETTER
Cardiology Consultation  [facility] [address], [address] [place] [place]  [phone]      02/25/21       Regarding:  Chidi Stephens  1945      To Whom It May Concern,      Patient is Intermediate Cardiac Risk for planned surgery. Special instructions:  Patient is taking ASA/plavix and can be held for 5-7 days prior to procedure and resumed as soon as surgical bleeding risk has resolved. Please continue other meds.         Thank you,      Ajay Hodge DO, [facility]  Cardiology  [facility]    Electronically signed by Ajay Hodge DO

## 2021-02-25 NOTE — PROGRESS NOTES
[address] [facility] [address]  Dept: [phone]  Loc: [phone]    CC: follow up for CAD    HPI:  The patient is a 76y.o. year old, , male is in the office for a follow up visit. Is going for back injection, would like clearance. Denies any cp, sob, orthopnea, pnd, le edema, palpitations. Past Medical History:   has a past medical history of Chronic bronchitis ([facility] [address] DJD (degenerative joint disease), GERD (gastroesophageal reflux disease), Hearing loss, Hyperlipidemia, Hypertension, Osteoarthritis of left knee, Overactive bladder, Prostate cancer ([facility] [address] Seasonal allergies, Spondylosis, and Type II or unspecified type diabetes mellitus without mention of complication, not stated as uncontrolled. Past Surgical History:   has a past surgical history that includes Upper gastrointestinal endoscopy (03/15/2002); Colonoscopy (03/15/2002); Prostatectomy (02/09/2010); Total knee arthroplasty (Left, 04/23/2013); Tonsillectomy; Cardiac surgery; Coronary angioplasty with stent; other surgical history (08/25/2009); pr colon ca scrn not hi rsk ind (N/A, 2/27/2017); and Total knee arthroplasty (Right, 02/06/2018). Home Medications:  Prior to Admission medications    Medication Sig Start Date End Date Taking?  Authorizing Provider   predniSONE (DELTASONE) 20 MG tablet Take 1 tablet by mouth daily for 5 days 2/22/21 2/27/21 Yes PAYTON Hagen - CNP   clopidogrel (PLAVIX) 75 MG tablet take 1 tablet by mouth once daily 10/23/20  Yes Vanesa Chao MD   dicyclomine (BENTYL) 10 MG capsule take 1 capsule by mouth four times a day if needed for BOWEL CRAMPING OR BLOATING 10/12/20  Yes Travis Lucas MD   metoprolol tartrate (LOPRESSOR) 50 MG tablet take 1 tablet by mouth twice a day 9/11/20  Yes Hunter Mcmahan,    levalbuterol (XOPENEX HFA) 45 MCG/ACT inhaler Inhale 1 puff into the lungs every 6 hours as needed for Wheezing or Shortness of Breath 7/28/20 7/28/21 Yes Bob Swift MD   ondansetron (ZOFRAN) 4 MG tablet take 1 tablet by mouth every 8 hours if needed for nausea and vomiting 5/15/20  Yes Jessica Arnett MD   triamcinolone (KENALOG) 0.1 % cream Apply topically 2 times daily. 5/15/20  Yes Jessica Arnett MD   nitroGLYCERIN (NITROSTAT) 0.4 MG SL tablet Place 1 tablet under the tongue every 5 minutes as needed for Chest pain 5/15/20  Yes Jessica Arnett MD   atorvastatin (LIPITOR) 40 MG tablet Take 1 tablet by mouth daily 5/15/20  Yes Jessica Arnett MD   lisinopril (PRINIVIL;ZESTRIL) 20 MG tablet take 1 tablet by mouth once daily 5/14/20  Yes Jessica Arnett MD   pantoprazole (PROTONIX) 40 MG tablet take 1 tablet by mouth once daily 4/1/20  Yes Jessica Arnett MD   HYDROcodone-acetaminophen ([facility]) 7.5-325 MG per tablet Takes 4 x daily 11/1/17  Yes Historical Provider, MD   fexofenadine (ALLEGRA) 180 MG tablet Take 180 mg by mouth daily   Yes Historical Provider, MD   hydrochlorothiazide (HYDRODIURIL) 25 MG tablet Take 25 mg by mouth daily   Yes Historical Provider, MD   cyclobenzaprine (FLEXERIL) 10 MG tablet Take 10 mg by mouth 3 times daily as needed for Muscle spasms   Yes Historical Provider, MD   aspirin 81 MG tablet Take 81 mg by mouth daily   Yes Historical Provider, MD       Allergies:  Zonisamide    Social History:   reports that he quit smoking about 33 years ago. He has a 10.00 pack-year smoking history. He has never used smokeless tobacco. He reports that he does not drink alcohol or use drugs. Review of Systems:  · Constitutional: there has been no unanticipated weight loss. There's been No change in energy level, No change in activity level. · Eyes: No visual changes or diplopia. No scleral icterus. · ENT: No Headaches, hearing loss or vertigo. No mouth sores or sore throat.   · Cardiovascular: As hpi  · Respiratory: as hpi  · Gastrointestinal: No abdominal pain, appetite loss, blood in stools. No change in bowel or bladder habits. · Genitourinary: No dysuria, trouble voiding, or hematuria. · Musculoskeletal:  No gait disturbance, No weakness or joint complaints. · Integumentary: No rash or pruritis. · Psychiatric: No anxiety, or depression. · Hematologic/Lymphatic: No abnormal bruising or bleeding, blood clots or swollen lymph nodes. · Allergic/Immunologic: No nasal congestion or hives. Physical Exam:  BP (!) 179/82   Pulse 67   Ht 5' 6\" (1.676 m)   Wt 148 lb (67.1 kg)   BMI 23.89 kg/m²     Constitutional and General Appearance: alert, cooperative, no distress and appears stated age  [de-identified]: PERRL, no cervical lymphadenopathy. No masses palpable. Normal oral mucosa  Respiratory:  · Normal excursion and expansion without use of accessory muscles  · Resp Auscultation: Good respiratory effort. No for increased work of breathing. On auscultation: clear to auscultation bilaterally  Cardiovascular:  · The apical impulse is not displaced  · Heart tones are crisp and normal. regular S1 and S2.  · Jugular venous pulsation Normal  · The carotid upstroke is normal in amplitude and contour without delay or bruit  · Peripheral pulses are symmetrical and full   Abdomen:   · No masses or tenderness  · Bowel sounds present  Extremities:  ·  No Cyanosis or Clubbing  ·  Lower extremity edema: No  ·  Skin: Warm and dry    Cardiac Data:  EKG: Sinus  Rhythm  -First degree A-V block   Tiffanie = 224  -Old inferior infarct.    -  Nonspecific T-abnormality. Nuclear Stress 9/20:  ECG INTERPRETATION:  1. Abnormal baseline ECG with T-wave inversion inferiorly, not worsened  on ECG portion of stress testing. 2. Await Nuclear portion. Nuclear Interpretation:  IMPRESSION:  1. Gut artifact inferiorly. No obvious ischemia or infarction. 2. Normal left ventricular ejection fraction 64%, normal wall motion.     Labs:   Lab Results   Component Value Date    CHOL 121 11/08/2019 TRIG 143 11/08/2019    HDL 36 (L) 11/08/2019    LDLCHOLESTEROL 56 11/08/2019    VLDL NOT REPORTED 11/08/2019    CHOLHDLRATIO 3.4 11/08/2019     Lab Results   Component Value Date     03/03/2020    K 4.8 03/03/2020     03/03/2020    CO2 29 03/03/2020    BUN 16 03/03/2020    CREATININE 0.93 03/03/2020    GLUCOSE 94 03/03/2020    CALCIUM 9.5 03/03/2020    PROT 6.7 03/03/2020    LABALBU 4.4 03/03/2020    BILITOT 0.52 03/03/2020    ALKPHOS 158 (H) 03/03/2020    AST 16 03/03/2020    ALT 18 03/03/2020    LABGLOM >60 03/03/2020    GFRAA >60 03/03/2020       IMPRESSIONS / RECOMMENDATIONS:  1. Preop risk for back injections   - intermediate risk   - can hold ASA/Plavix for 5-7 days prior    2. CAD- History of stent 7/7/2015 Has 2 RCA and 1 Circ stent- stable  - reviewed stress test 9/20- low risk  - continue current meds    3. HYPERTENSION- higher even at home  - ? states due to cold medication/decongestant he is taking  - increase lisinopril 40 mg po qd    4. HYPERLIPIDEMIA- at goal, LDL 56 in 11/19 ON STATIN    The patient is to continue heart healthy diet, weight loss and exercise as tolerated. Patient's medications and side effects were discussed. Medication refills were provided if needed. Follow up appointment timing was discussed. All questions and concerns were addressed to patient's satisfaction. The patient is to follow up in 6 months or sooner if necessary. Thank you for allowing me to participate in the care of this patient, please do not hesitate to call if you have any questions. Lucia Osuna DO, [facility], [address] [facility]  [facility]. [facility]  [phone]

## 2021-05-17 ENCOUNTER — OFFICE VISIT (OUTPATIENT)
Dept: INTERNAL MEDICINE | Age: 76
End: 2021-05-17
Payer: MEDICARE

## 2021-05-17 VITALS
BODY MASS INDEX: 23.63 KG/M2 | DIASTOLIC BLOOD PRESSURE: 68 MMHG | RESPIRATION RATE: 16 BRPM | WEIGHT: 147 LBS | HEIGHT: 66 IN | SYSTOLIC BLOOD PRESSURE: 124 MMHG | HEART RATE: 60 BPM

## 2021-05-17 DIAGNOSIS — E78.5 HYPERLIPIDEMIA, UNSPECIFIED HYPERLIPIDEMIA TYPE: ICD-10-CM

## 2021-05-17 DIAGNOSIS — I10 ESSENTIAL HYPERTENSION: Primary | ICD-10-CM

## 2021-05-17 DIAGNOSIS — C61 ADENOCARCINOMA OF PROSTATE (HCC): ICD-10-CM

## 2021-05-17 DIAGNOSIS — J42 CHRONIC BRONCHITIS, UNSPECIFIED CHRONIC BRONCHITIS TYPE (HCC): ICD-10-CM

## 2021-05-17 DIAGNOSIS — E11.9 TYPE 2 DIABETES MELLITUS WITHOUT COMPLICATION, WITHOUT LONG-TERM CURRENT USE OF INSULIN (HCC): ICD-10-CM

## 2021-05-17 PROCEDURE — 3046F HEMOGLOBIN A1C LEVEL >9.0%: CPT | Performed by: INTERNAL MEDICINE

## 2021-05-17 PROCEDURE — G8427 DOCREV CUR MEDS BY ELIG CLIN: HCPCS | Performed by: INTERNAL MEDICINE

## 2021-05-17 PROCEDURE — G8420 CALC BMI NORM PARAMETERS: HCPCS | Performed by: INTERNAL MEDICINE

## 2021-05-17 PROCEDURE — 3017F COLORECTAL CA SCREEN DOC REV: CPT | Performed by: INTERNAL MEDICINE

## 2021-05-17 PROCEDURE — 99214 OFFICE O/P EST MOD 30 MIN: CPT | Performed by: INTERNAL MEDICINE

## 2021-05-17 PROCEDURE — 1036F TOBACCO NON-USER: CPT | Performed by: INTERNAL MEDICINE

## 2021-05-17 PROCEDURE — G8926 SPIRO NO PERF OR DOC: HCPCS | Performed by: INTERNAL MEDICINE

## 2021-05-17 PROCEDURE — 3023F SPIROM DOC REV: CPT | Performed by: INTERNAL MEDICINE

## 2021-05-17 PROCEDURE — 4040F PNEUMOC VAC/ADMIN/RCVD: CPT | Performed by: INTERNAL MEDICINE

## 2021-05-17 PROCEDURE — 2022F DILAT RTA XM EVC RTNOPTHY: CPT | Performed by: INTERNAL MEDICINE

## 2021-05-17 PROCEDURE — 1123F ACP DISCUSS/DSCN MKR DOCD: CPT | Performed by: INTERNAL MEDICINE

## 2021-05-17 ASSESSMENT — ENCOUNTER SYMPTOMS
SHORTNESS OF BREATH: 0
COUGH: 0
NAUSEA: 0
CONSTIPATION: 0
EYE PAIN: 0
VOMITING: 0
BLOOD IN STOOL: 0
BACK PAIN: 0
DIARRHEA: 0
ABDOMINAL PAIN: 0

## 2021-05-17 ASSESSMENT — COPD QUESTIONNAIRES: COPD: 1

## 2021-05-17 NOTE — PROGRESS NOTES
[place] [address]  Dept: [phone]  Dept Fax: [phone]  Loc: [phone]     Bushra Hood is a 76 y.o. male who presents today for his medical conditions/complaintsas noted below. Bushra Hood is c/o of   Chief Complaint   Patient presents with    Hypertension     6 month    Hyperlipidemia    Diabetes         HPI:     Hypertension  This is a chronic (esential htn) problem. The current episode started more than 1 year ago. The problem has been waxing and waning since onset. The problem is controlled. Pertinent negatives include no chest pain, headaches, neck pain, palpitations or shortness of breath. Hyperlipidemia  This is a chronic problem. The current episode started more than 1 year ago. The problem is controlled. Recent lipid tests were reviewed and are variable. Pertinent negatives include no chest pain or shortness of breath. Diabetes  He presents for his follow-up diabetic visit. He has type 2 (without complication, and without insulin use) diabetes mellitus. His disease course has been fluctuating. Pertinent negatives for hypoglycemia include no confusion, dizziness, headaches, nervousness/anxiousness or pallor. Pertinent negatives for diabetes include no chest pain, no polydipsia, no polyuria and no weakness. COPD  There is no cough or shortness of breath. Primary symptoms comments: Chronic bronchitis, unspecified chronic bronchitis. This is a chronic problem. The current episode started more than 1 year ago. The problem occurs constantly. The problem has been waxing and waning. Pertinent negatives include no chest pain, fever or headaches. Other  This is a chronic (Adenocarcinoma of the prostate) problem. The current episode started more than 1 year ago. The problem occurs constantly. The problem has been waxing and waning.  Pertinent negatives include no abdominal pain, arthralgias, chest pain, chills, coughing, fever, headaches, nausea, neck pain, numbness, rash, vomiting or weakness. Hemoglobin A1C (%)   Date Value   11/09/2020 5.9   05/06/2020 5.8   11/08/2019 5.9            Microalb/Crt. Ratio (mcg/mg creat)   Date Value   11/09/2020 CANNOT BE CALCULATED     LDL Cholesterol (mg/dL)   Date Value   11/08/2019 56   11/06/2018 59   11/01/2017 59         AST (U/L)   Date Value   03/03/2020 16     ALT (U/L)   Date Value   03/03/2020 18     BUN (mg/dL)   Date Value   03/03/2020 16     BP Readings from Last 3 Encounters:   05/17/21 124/68   02/25/21 (!) 191/80   02/22/21 (!) 140/76              Past Medical History:   Diagnosis Date    Chronic bronchitis (HCC)     DJD (degenerative joint disease)     GERD (gastroesophageal reflux disease)     Hearing loss     Mild    Hyperlipidemia     Hypertension     Osteoarthritis of left knee     Overactive bladder     Prostate cancer ([facility] [address]     Status post radical prostatectomy in 2009. Continues to follow with Dr. Sadaf Gilman every 6 months.     Seasonal allergies     Spondylosis     Type II or unspecified type diabetes mellitus without mention of complication, not stated as uncontrolled     Diet controlled      Past Surgical History:   Procedure Laterality Date    CARDIAC SURGERY      COLONOSCOPY  03/15/2002    Dr. Jenna Ortiz      july 2015 drug-eluting stents placed 2 to the RCA and one to the left circumflex    OTHER SURGICAL HISTORY  08/25/2009    bilateral L3-4, L4-5, L5-S1 steroid facet injection     [place] COLON CA SCRN NOT HI RSK IND N/A 2/27/2017    COLONOSCOPY performed by Stan Milian MD at [address] [place] [address] OR  diverticulosis    PROSTATECTOMY  02/09/2010    Dr. Sho Pierre Left 04/23/2013    TOTAL KNEE ARTHROPLASTY Right 02/06/2018    Dr. Omar Ortiz/ [facility], [address] [place] [address] ENDOSCOPY  03/15/2002    Dr. Fabián Brink current facility-administered medications for this visit. Allergies   Allergen Reactions    Zonisamide Other (See Comments)     ANXIETY       Health Maintenance   Topic Date Due    Diabetic retinal exam  Never done    Shingles Vaccine (1 of 2) Never done    Lipid screen  11/08/2020    Potassium monitoring  03/03/2021    Creatinine monitoring  03/03/2021    A1C test (Diabetic or Prediabetic)  11/09/2021    PSA counseling  11/09/2021    Annual Wellness Visit (AWV)  11/17/2021    Colon cancer screen colonoscopy  02/27/2022    Diabetic foot exam  05/17/2022    DTaP/Tdap/Td vaccine (2 - Td) 11/25/2029    Flu vaccine  Completed    Pneumococcal 65+ years Vaccine  Completed    COVID-19 Vaccine  Completed    AAA screen  Completed    Hepatitis C screen  Completed    Hepatitis A vaccine  Aged Out    Hib vaccine  Aged Out    Meningococcal (ACWY) vaccine  Aged Out       Subjective:      Review of Systems   Constitutional: Negative for chills and fever. HENT: Negative for hearing loss. Eyes: Negative for pain and visual disturbance. Respiratory: Negative for cough and shortness of breath. Cardiovascular: Negative for chest pain, palpitations and leg swelling. Gastrointestinal: Negative for abdominal pain, blood in stool, constipation, diarrhea, nausea and vomiting. Endocrine: Negative for cold intolerance, polydipsia and polyuria. Genitourinary: Negative for difficulty urinating, dysuria and hematuria. Musculoskeletal: Negative for arthralgias, back pain, gait problem and neck pain. Skin: Negative for pallor and rash. Neurological: Negative for dizziness, weakness, numbness and headaches. Hematological: Negative for adenopathy. Does not bruise/bleed easily. Psychiatric/Behavioral: Negative for confusion. The patient is not nervous/anxious. Objective:     Physical Exam  Vitals reviewed. Constitutional:       Appearance: He is well-developed.    HENT:      Head: week.    Patient told to continue lisinopril and Plavix. Plan:       Return in about 6 months (around 11/18/2021) for medicare AWV, Hypertension, Hyperlipidemia, Diabetes. Orders Placed This Encounter   Procedures    PSA, Diagnostic     Standing Status:   Future     Standing Expiration Date:   5/17/2022    Hemoglobin A1C     Standing Status:   Future     Standing Expiration Date:   5/17/2022    Microalbumin, Ur     Standing Status:   Future     Standing Expiration Date:   5/17/2022     DIABETES FOOT EXAM     No orders of the defined types were placed in this encounter. Patientgiven educational materials - see patient instructions. Discussed use, benefit,and side effects of prescribed medications. All patient questions answered. Ptvoiced understanding. Reviewed health maintenance. Instructed to continue currentmedications, diet and exercise. Patient agreed with treatment plan. Follow up asdirected.      Electronically signed by Lyle Meza MD on 5/17/2021 at 1:57 PM

## 2021-05-24 DIAGNOSIS — E78.49 OTHER HYPERLIPIDEMIA: ICD-10-CM

## 2021-05-24 RX ORDER — ATORVASTATIN CALCIUM 40 MG/1
TABLET, FILM COATED ORAL
Qty: 90 TABLET | Refills: 3 | Status: SHIPPED | OUTPATIENT
Start: 2021-05-24 | End: 2022-05-10

## 2021-06-14 RX ORDER — PANTOPRAZOLE SODIUM 40 MG/1
TABLET, DELAYED RELEASE ORAL
Qty: 90 TABLET | Refills: 3 | Status: SHIPPED | OUTPATIENT
Start: 2021-06-14 | End: 2022-06-03

## 2021-06-29 RX ORDER — HYDROCODONE BITARTRATE AND ACETAMINOPHEN 10; 325 MG/1; MG/1
1 TABLET ORAL EVERY 6 HOURS PRN
COMMUNITY

## 2021-07-07 ENCOUNTER — HOSPITAL ENCOUNTER (OUTPATIENT)
Dept: LAB | Age: 76
Discharge: HOME | End: 2021-07-07
Payer: MEDICARE

## 2021-07-07 DIAGNOSIS — E11.9 TYPE 2 DIABETES MELLITUS WITHOUT COMPLICATION, WITHOUT LONG-TERM CURRENT USE OF INSULIN (HCC): ICD-10-CM

## 2021-07-07 DIAGNOSIS — Z00.00 ROUTINE GENERAL MEDICAL EXAMINATION AT A HEALTH CARE FACILITY: ICD-10-CM

## 2021-07-07 DIAGNOSIS — E78.5 HYPERLIPIDEMIA, UNSPECIFIED HYPERLIPIDEMIA TYPE: ICD-10-CM

## 2021-07-07 DIAGNOSIS — I10 ESSENTIAL HYPERTENSION: ICD-10-CM

## 2021-07-07 LAB
ALBUMIN SERPL-MCNC: 4.3 G/DL (ref 3.5–5.2)
ALBUMIN/GLOBULIN RATIO: 1.7 (ref 1–2.5)
ALP BLD-CCNC: 127 U/L (ref 40–129)
ALT SERPL-CCNC: 15 U/L (ref 5–41)
ANION GAP SERPL CALCULATED.3IONS-SCNC: 7 MMOL/L (ref 9–17)
AST SERPL-CCNC: 18 U/L
BILIRUB SERPL-MCNC: 0.5 MG/DL (ref 0.3–1.2)
BUN BLDV-MCNC: 19 MG/DL (ref 8–23)
BUN/CREAT BLD: 19 (ref 9–20)
CALCIUM SERPL-MCNC: 9.7 MG/DL (ref 8.6–10.4)
CHLORIDE BLD-SCNC: 102 MMOL/L (ref 98–107)
CO2: 29 MMOL/L (ref 20–31)
CREAT SERPL-MCNC: 1 MG/DL (ref 0.7–1.2)
ESTIMATED AVERAGE GLUCOSE: 117 MG/DL
GFR AFRICAN AMERICAN: >60 ML/MIN
GFR NON-AFRICAN AMERICAN: >60 ML/MIN
GFR SERPL CREATININE-BSD FRML MDRD: ABNORMAL ML/MIN/{1.73_M2}
GFR SERPL CREATININE-BSD FRML MDRD: ABNORMAL ML/MIN/{1.73_M2}
GLUCOSE FASTING: 103 MG/DL (ref 70–99)
HBA1C MFR BLD: 5.7 % (ref 4–6)
POTASSIUM SERPL-SCNC: 4.5 MMOL/L (ref 3.7–5.3)
SODIUM BLD-SCNC: 138 MMOL/L (ref 135–144)
TOTAL PROTEIN: 6.9 G/DL (ref 6.4–8.3)

## 2021-07-07 PROCEDURE — 80053 COMPREHEN METABOLIC PANEL: CPT

## 2021-07-07 PROCEDURE — 36415 COLL VENOUS BLD VENIPUNCTURE: CPT

## 2021-07-07 PROCEDURE — 83036 HEMOGLOBIN GLYCOSYLATED A1C: CPT

## 2021-07-07 PROCEDURE — 80061 LIPID PANEL: CPT

## 2021-07-08 LAB
CHOLESTEROL/HDL RATIO: 3.6
CHOLESTEROL: 132 MG/DL
HDLC SERPL-MCNC: 37 MG/DL
LDL CHOLESTEROL: 71 MG/DL (ref 0–130)
TRIGL SERPL-MCNC: 122 MG/DL
VLDLC SERPL CALC-MCNC: ABNORMAL MG/DL (ref 1–30)

## 2021-07-20 ENCOUNTER — OFFICE VISIT (OUTPATIENT)
Dept: PRIMARY CARE CLINIC | Age: 76
End: 2021-07-20
Payer: MEDICARE

## 2021-07-20 VITALS
OXYGEN SATURATION: 97 % | HEART RATE: 70 BPM | SYSTOLIC BLOOD PRESSURE: 116 MMHG | TEMPERATURE: 98 F | BODY MASS INDEX: 23.68 KG/M2 | WEIGHT: 146.7 LBS | DIASTOLIC BLOOD PRESSURE: 74 MMHG

## 2021-07-20 DIAGNOSIS — M25.571 PAIN IN JOINTS OF BOTH FEET: Primary | ICD-10-CM

## 2021-07-20 DIAGNOSIS — M25.572 PAIN IN JOINTS OF BOTH FEET: Primary | ICD-10-CM

## 2021-07-20 PROCEDURE — 4040F PNEUMOC VAC/ADMIN/RCVD: CPT | Performed by: FAMILY MEDICINE

## 2021-07-20 PROCEDURE — 99214 OFFICE O/P EST MOD 30 MIN: CPT | Performed by: FAMILY MEDICINE

## 2021-07-20 PROCEDURE — G8427 DOCREV CUR MEDS BY ELIG CLIN: HCPCS | Performed by: FAMILY MEDICINE

## 2021-07-20 PROCEDURE — 3017F COLORECTAL CA SCREEN DOC REV: CPT | Performed by: FAMILY MEDICINE

## 2021-07-20 PROCEDURE — 1036F TOBACCO NON-USER: CPT | Performed by: FAMILY MEDICINE

## 2021-07-20 PROCEDURE — G8420 CALC BMI NORM PARAMETERS: HCPCS | Performed by: FAMILY MEDICINE

## 2021-07-20 PROCEDURE — 1123F ACP DISCUSS/DSCN MKR DOCD: CPT | Performed by: FAMILY MEDICINE

## 2021-07-20 PROCEDURE — 99212 OFFICE O/P EST SF 10 MIN: CPT | Performed by: FAMILY MEDICINE

## 2021-07-20 RX ORDER — PREDNISONE 20 MG/1
TABLET ORAL
Qty: 8 TABLET | Refills: 0 | Status: SHIPPED | OUTPATIENT
Start: 2021-07-20 | End: 2021-08-19 | Stop reason: ALTCHOICE

## 2021-07-20 ASSESSMENT — PATIENT HEALTH QUESTIONNAIRE - PHQ9
SUM OF ALL RESPONSES TO PHQ QUESTIONS 1-9: 0
2. FEELING DOWN, DEPRESSED OR HOPELESS: 0
SUM OF ALL RESPONSES TO PHQ QUESTIONS 1-9: 0
SUM OF ALL RESPONSES TO PHQ QUESTIONS 1-9: 0
SUM OF ALL RESPONSES TO PHQ9 QUESTIONS 1 & 2: 0
1. LITTLE INTEREST OR PLEASURE IN DOING THINGS: 0

## 2021-07-20 ASSESSMENT — ENCOUNTER SYMPTOMS
SHORTNESS OF BREATH: 0
WHEEZING: 0
COUGH: 0
ABDOMINAL PAIN: 0
DIARRHEA: 0
CONSTIPATION: 0
CHEST TIGHTNESS: 0

## 2021-07-20 NOTE — PROGRESS NOTES
Negative for cough, chest tightness, shortness of breath and wheezing. Cardiovascular: Negative for chest pain, palpitations and leg swelling. Gastrointestinal: Negative for abdominal pain, constipation and diarrhea. Genitourinary: Negative for difficulty urinating. Musculoskeletal: Positive for arthralgias (feet, knees). Negative for joint swelling and myalgias. Skin: Negative for rash. Neurological: Negative for dizziness, syncope, weakness, light-headedness and headaches. Objective:      Physical Exam  Vitals and nursing note reviewed. Constitutional:       General: He is not in acute distress. Appearance: He is well-developed. Eyes:      Conjunctiva/sclera: Conjunctivae normal.   Cardiovascular:      Rate and Rhythm: Normal rate and regular rhythm. Heart sounds: Normal heart sounds. No murmur heard. Pulmonary:      Effort: Pulmonary effort is normal. No respiratory distress. Breath sounds: Normal breath sounds. No wheezing or rales. Musculoskeletal:         General: Normal range of motion. Cervical back: Normal range of motion and neck supple. Right knee: Normal.      Left knee: Normal.   Lymphadenopathy:      Cervical: No cervical adenopathy. Skin:     General: Skin is warm and dry. Findings: No rash. Neurological:      Mental Status: He is alert and oriented to person, place, and time. /74 (Site: Right Upper Arm, Position: Sitting, Cuff Size: Large Adult)   Pulse 70   Temp 98 °F (36.7 °C) (Tympanic)   Wt 146 lb 11.2 oz (66.5 kg)   SpO2 97%   BMI 23.68 kg/m²     Assessment:       Diagnosis Orders   1. Pain in joints of both feet               Plan:        Foot pain/knee pain: worsening; he seems to have chronic arthritis that flared up after his tooth extraction. I will treat with prednisone. Return if symptoms worsen or fail to improve.     Orders Placed This Encounter   Medications    predniSONE (DELTASONE) 20 MG tablet Si daily for 5 days, 1/2 qd for 5 days     Dispense:  8 tablet     Refill:  0       Patientgiven educational materials - see patient instructions. Discussed use, benefit,and side effects of prescribed medications. All patient questions answered. Ptvoiced understanding. Reviewed health maintenance. Instructed to continue currentmedications, diet and exercise. Patient agreed with treatment plan. Follow up asdirected.      Electronically signed by Whitney Elliott MD on 2021 at 1:11 PM

## 2021-08-19 ENCOUNTER — OFFICE VISIT (OUTPATIENT)
Dept: CARDIOLOGY | Age: 76
End: 2021-08-19
Payer: MEDICARE

## 2021-08-19 VITALS
DIASTOLIC BLOOD PRESSURE: 68 MMHG | BODY MASS INDEX: 23.46 KG/M2 | HEART RATE: 59 BPM | SYSTOLIC BLOOD PRESSURE: 156 MMHG | WEIGHT: 146 LBS | HEIGHT: 66 IN

## 2021-08-19 DIAGNOSIS — I25.10 CORONARY ARTERY DISEASE INVOLVING NATIVE CORONARY ARTERY OF NATIVE HEART WITHOUT ANGINA PECTORIS: Primary | ICD-10-CM

## 2021-08-19 DIAGNOSIS — E78.5 HYPERLIPIDEMIA, UNSPECIFIED HYPERLIPIDEMIA TYPE: ICD-10-CM

## 2021-08-19 DIAGNOSIS — I21.4 NSTEMI (NON-ST ELEVATED MYOCARDIAL INFARCTION) (HCC): ICD-10-CM

## 2021-08-19 DIAGNOSIS — I10 ESSENTIAL HYPERTENSION: ICD-10-CM

## 2021-08-19 PROCEDURE — G8420 CALC BMI NORM PARAMETERS: HCPCS | Performed by: INTERNAL MEDICINE

## 2021-08-19 PROCEDURE — G8427 DOCREV CUR MEDS BY ELIG CLIN: HCPCS | Performed by: INTERNAL MEDICINE

## 2021-08-19 PROCEDURE — 1036F TOBACCO NON-USER: CPT | Performed by: INTERNAL MEDICINE

## 2021-08-19 PROCEDURE — 99214 OFFICE O/P EST MOD 30 MIN: CPT | Performed by: INTERNAL MEDICINE

## 2021-08-19 PROCEDURE — 93005 ELECTROCARDIOGRAM TRACING: CPT | Performed by: INTERNAL MEDICINE

## 2021-08-19 PROCEDURE — 4040F PNEUMOC VAC/ADMIN/RCVD: CPT | Performed by: INTERNAL MEDICINE

## 2021-08-19 PROCEDURE — 1123F ACP DISCUSS/DSCN MKR DOCD: CPT | Performed by: INTERNAL MEDICINE

## 2021-08-19 PROCEDURE — 3017F COLORECTAL CA SCREEN DOC REV: CPT | Performed by: INTERNAL MEDICINE

## 2021-08-19 PROCEDURE — 93010 ELECTROCARDIOGRAM REPORT: CPT | Performed by: INTERNAL MEDICINE

## 2021-08-19 NOTE — PROGRESS NOTES
[address] [place] [id number]  Dept: [phone]  Loc: [phone]    Subjective: The patient is a 76y.o. year old, , male is in the office for a follow up history of stent hypertension. He valeria any chest pain or discomfort. No orthopnea or PND. Valeria any palpitation, dizziness or syncope. He is completely asymptomatic from cardiac stand point. According to him the only problem is the back pain. The  Past Medical History:   has a past medical history of Chronic bronchitis ([facility] [address] DJD (degenerative joint disease), GERD (gastroesophageal reflux disease), Hearing loss, Hyperlipidemia, Hypertension, Osteoarthritis of left knee, Overactive bladder, Prostate cancer ([facility] [address] Seasonal allergies, Spondylosis, and Type II or unspecified type diabetes mellitus without mention of complication, not stated as uncontrolled. Past Surgical History:   has a past surgical history that includes Upper gastrointestinal endoscopy (03/15/2002); Colonoscopy (03/15/2002); Prostatectomy (02/09/2010); Total knee arthroplasty (Left, 04/23/2013); Tonsillectomy; Cardiac surgery; Coronary angioplasty with stent; other surgical history (08/25/2009); pr colon ca scrn not hi rsk ind (N/A, 2/27/2017); and Total knee arthroplasty (Right, 02/06/2018). Home Medications:  Prior to Admission medications    Medication Sig Start Date End Date Taking? Authorizing Provider   HYDROcodone-acetaminophen (NORCO)  MG per tablet Take 1 tablet by mouth every 6 hours as needed for Pain.    Yes Historical Provider, MD   pantoprazole (PROTONIX) 40 MG tablet take 1 tablet by mouth once daily 6/14/21  Yes Chan Salas MD   atorvastatin (LIPITOR) 40 MG tablet take 1 tablet by mouth once daily 5/24/21  Yes Chan Salas MD   lisinopril (PRINIVIL;ZESTRIL) 40 MG tablet Take 1 tablet by mouth daily 2/25/21  Yes Tobias Ware DO clopidogrel (PLAVIX) 75 MG tablet take 1 tablet by mouth once daily 10/23/20  Yes Nilda Shetty MD   dicyclomine (BENTYL) 10 MG capsule take 1 capsule by mouth four times a day if needed for BOWEL CRAMPING OR BLOATING 10/12/20  Yes Royal Mcdermott MD   metoprolol tartrate (LOPRESSOR) 50 MG tablet take 1 tablet by mouth twice a day 9/11/20  Yes Hunter Mcmahan DO   ondansetron (ZOFRAN) 4 MG tablet take 1 tablet by mouth every 8 hours if needed for nausea and vomiting 5/15/20  Yes Royal Mcdermott MD   fexofenadine (ALLEGRA) 180 MG tablet Take 180 mg by mouth daily   Yes Historical Provider, MD   hydrochlorothiazide (HYDRODIURIL) 25 MG tablet Take 25 mg by mouth daily   Yes Historical Provider, MD   aspirin 81 MG tablet Take 81 mg by mouth daily   Yes Historical Provider, MD   levalbuterol (XOPENEX HFA) 45 MCG/ACT inhaler Inhale 1 puff into the lungs every 6 hours as needed for Wheezing or Shortness of Breath 7/28/20 7/28/21  Magno Messina MD   triamcinolone (KENALOG) 0.1 % cream Apply topically 2 times daily. Patient not taking: Reported on 8/19/2021 5/15/20   Royal Mcdermott MD   nitroGLYCERIN (NITROSTAT) 0.4 MG SL tablet Place 1 tablet under the tongue every 5 minutes as needed for Chest pain  Patient not taking: Reported on 8/19/2021 5/15/20   Royal Mcdermott MD   cyclobenzaprine (FLEXERIL) 10 MG tablet Take 10 mg by mouth 3 times daily as needed for Muscle spasms  Patient not taking: Reported on 8/19/2021    Historical Provider, MD       Allergies:  Zonisamide    Social History:   reports that he quit smoking about 34 years ago. He has a 10.00 pack-year smoking history. He has never used smokeless tobacco. He reports that he does not drink alcohol and does not use drugs. Review of Systems:  · Constitutional: there has been no unanticipated weight loss. There's been No change in energy level, No change in activity level. · Eyes: No visual changes or diplopia. No scleral icterus.   · ENT: No Headaches, hearing loss or vertigo. No mouth sores or sore throat. · Cardiovascular: As above. · Respiratory: No SOB, cough or hemoptysis. · Gastrointestinal: No abdominal pain, appetite loss, blood in stools. No change in bowel or bladder habits. · Genitourinary: No dysuria, trouble voiding, or hematuria. · Musculoskeletal:  No gait disturbance, No weakness or joint complaints. · Integumentary: No rash or pruritis. · Psychiatric: No anxiety, or depression. · Hematologic/Lymphatic: No abnormal bruising or bleeding, blood clots or swollen lymph nodes. · Allergic/Immunologic: No nasal congestion or hives. Physical Exam:  BP (!) 143/67 (Site: Left Upper Arm, Position: Sitting, Cuff Size: Medium Adult)   Pulse 59   Ht 5' 6\" (1.676 m)   Wt 146 lb (66.2 kg)   BMI 23.57 kg/m²     Constitutional and General Appearance: alert, cooperative, no distress and appears stated age  HEENT: PERRL, no cervical lymphadenopathy. No masses palpable. Normal oral mucosa  Respiratory:  · Normal excursion and expansion without use of accessory muscles  · Resp Auscultation: Good respiratory effort. No for increased work of breathing. On auscultation: clear to auscultation bilaterally  Cardiovascular:  · The apical impulse is not displaced  · Heart tones are crisp and normal. regular S1 and S2.  · Jugular venous pulsation Normal  · The carotid upstroke is normal in amplitude and contour without delay or bruit  · Peripheral pulses are symmetrical and full   Abdomen:   · No masses or tenderness  · Bowel sounds present  Extremities:  ·  No Cyanosis or Clubbing  ·  Lower extremity edema: No  ·  Skin: Warm and dry    Cardiac Data:  EKG: Sinus bradycardia  TTE 11/18/15  1. All cardiac chambers are normal in dimension. 2. Left ventricular systolic function is normal. Ejection  fraction is 60%. 3. Basal inferior wall is hypokinetic. 4. Grade II diastolic dysfunction. 5. Normal right ventricular size and function.   6. Mild mitral annular calcification. Mild mitral regurgitation. 7. Mildly sclerotic aortic valve with mild regurgitation. 8. Mild tricuspid regurgitation. RVSP is 39 mm Hg. 9. No pericardial effusion. Last Cath: 7/7/2015 Has 2 RCA and 1 Circ stent. All were Resolute Integrity stents         Labs:     CBC: No results for input(s): WBC, HGB, HCT, PLT in the last 72 hours. BMP: No results for input(s): NA, K, CO2, BUN, CREATININE, LABGLOM, GLUCOSE in the last 72 hours. PT/INR: No results for input(s): PROTIME, INR in the last 72 hours. FASTING LIPID PANEL:  Lab Results   Component Value Date    CHOL 132 07/07/2021    HDL 37 07/07/2021    LDLCHOLESTEROL 71 07/07/2021    TRIG 122 07/07/2021    CHOLHDLRATIO 3.6 07/07/2021     LIVER PROFILE:No results for input(s): AST, ALT, LABALBU in the last 72 hours. IMPRESSION:    Patient Active Problem List   Diagnosis    Hypertension    Prostate cancer ([facility] [address]    Hyperlipidemia    Troponin level elevated    Enteritis    CAD (coronary artery disease) SP drug eluting stent 2 stents to RCA and 1 to L circ     NSTEMI (non-ST elevated myocardial infarction) ([facility] [address]    Type 2 diabetes mellitus without complication (HCC)    History of colon polyps    Chronic midline low back pain with bilateral sciatica    Bronchiectasis with acute lower respiratory infection ([facility] [address]       RECOMMENDATIONS:   CAD- History of stents. Details as above  · NO CHEST PAIN, CONTINUE CURRENT MEDICATIONS       HYPERTENSION- WELL CONTROLLED, WILL CONTINUE CURRENT MEDICATIONS     HYPERLIPIDEMIA- ON STATIN, NEEDS TO WATCH LIPID PROFILE AND LFT'S      DISCUSSED IN DETAILS ABOUT RISK MODIFICATION    RETURN VISIT IN 6 MONTHS, IF ANY SYMPTOM CHANGE PATIENT ADVISED TO GO TO THE EMERGENCY ROOM.           Rishi Stinson MD, MD  [facility]           [phone]

## 2021-09-12 DIAGNOSIS — I10 ESSENTIAL HYPERTENSION: ICD-10-CM

## 2021-09-13 RX ORDER — METOPROLOL TARTRATE 50 MG/1
TABLET, FILM COATED ORAL
Qty: 180 TABLET | Refills: 3 | Status: SHIPPED | OUTPATIENT
Start: 2021-09-13 | End: 2022-10-31

## 2021-10-05 ENCOUNTER — TELEPHONE (OUTPATIENT)
Dept: CARDIOLOGY | Age: 76
End: 2021-10-05

## 2021-11-17 ENCOUNTER — OFFICE VISIT (OUTPATIENT)
Dept: PULMONOLOGY | Age: 76
End: 2021-11-17
Payer: MEDICARE

## 2021-11-17 VITALS
WEIGHT: 149.6 LBS | OXYGEN SATURATION: 98 % | SYSTOLIC BLOOD PRESSURE: 130 MMHG | HEIGHT: 66 IN | DIASTOLIC BLOOD PRESSURE: 80 MMHG | TEMPERATURE: 98.1 F | BODY MASS INDEX: 24.04 KG/M2 | HEART RATE: 68 BPM

## 2021-11-17 DIAGNOSIS — J47.9 BRONCHIECTASIS WITHOUT COMPLICATION (HCC): Primary | ICD-10-CM

## 2021-11-17 PROCEDURE — 99213 OFFICE O/P EST LOW 20 MIN: CPT | Performed by: INTERNAL MEDICINE

## 2021-11-17 PROCEDURE — 4040F PNEUMOC VAC/ADMIN/RCVD: CPT | Performed by: INTERNAL MEDICINE

## 2021-11-17 PROCEDURE — 1123F ACP DISCUSS/DSCN MKR DOCD: CPT | Performed by: INTERNAL MEDICINE

## 2021-11-17 PROCEDURE — G8427 DOCREV CUR MEDS BY ELIG CLIN: HCPCS | Performed by: INTERNAL MEDICINE

## 2021-11-17 PROCEDURE — G8484 FLU IMMUNIZE NO ADMIN: HCPCS | Performed by: INTERNAL MEDICINE

## 2021-11-17 PROCEDURE — G8420 CALC BMI NORM PARAMETERS: HCPCS | Performed by: INTERNAL MEDICINE

## 2021-11-17 PROCEDURE — 1036F TOBACCO NON-USER: CPT | Performed by: INTERNAL MEDICINE

## 2021-11-17 RX ORDER — LEVALBUTEROL TARTRATE 45 UG/1
1 AEROSOL, METERED ORAL EVERY 6 HOURS PRN
Qty: 1 EACH | Refills: 5 | Status: SHIPPED | OUTPATIENT
Start: 2021-11-17 | End: 2022-08-18 | Stop reason: SDUPTHER

## 2021-11-17 RX ORDER — ALBUTEROL SULFATE 90 UG/1
2 AEROSOL, METERED RESPIRATORY (INHALATION) 2 TIMES DAILY
COMMUNITY
End: 2021-11-17

## 2021-11-19 ENCOUNTER — HOSPITAL ENCOUNTER (OUTPATIENT)
Dept: LAB | Age: 76
Discharge: HOME | End: 2021-11-19
Payer: MEDICARE

## 2021-11-19 ENCOUNTER — OFFICE VISIT (OUTPATIENT)
Dept: INTERNAL MEDICINE | Age: 76
End: 2021-11-19
Payer: MEDICARE

## 2021-11-19 VITALS
DIASTOLIC BLOOD PRESSURE: 78 MMHG | HEIGHT: 66 IN | WEIGHT: 149 LBS | BODY MASS INDEX: 23.95 KG/M2 | SYSTOLIC BLOOD PRESSURE: 130 MMHG | HEART RATE: 69 BPM | RESPIRATION RATE: 16 BRPM

## 2021-11-19 DIAGNOSIS — E11.9 TYPE 2 DIABETES MELLITUS WITHOUT COMPLICATION, WITHOUT LONG-TERM CURRENT USE OF INSULIN (HCC): ICD-10-CM

## 2021-11-19 DIAGNOSIS — I10 PRIMARY HYPERTENSION: ICD-10-CM

## 2021-11-19 DIAGNOSIS — Z91.09 ENVIRONMENTAL ALLERGIES: ICD-10-CM

## 2021-11-19 DIAGNOSIS — Z00.00 ROUTINE GENERAL MEDICAL EXAMINATION AT A HEALTH CARE FACILITY: Primary | ICD-10-CM

## 2021-11-19 DIAGNOSIS — Z00.00 MEDICARE ANNUAL WELLNESS VISIT, SUBSEQUENT: ICD-10-CM

## 2021-11-19 DIAGNOSIS — E78.5 HYPERLIPIDEMIA, UNSPECIFIED HYPERLIPIDEMIA TYPE: ICD-10-CM

## 2021-11-19 DIAGNOSIS — C61 ADENOCARCINOMA OF PROSTATE (HCC): ICD-10-CM

## 2021-11-19 LAB
CREATININE URINE: 97.7 MG/DL (ref 39–259)
MICROALBUMIN/CREAT 24H UR: <12 MG/L
MICROALBUMIN/CREAT UR-RTO: NORMAL MCG/MG CREAT
PROSTATE SPECIFIC ANTIGEN: <0.02 UG/L

## 2021-11-19 PROCEDURE — PBSHW INFLUENZA, QUADV, ADJUVANTED, 65 YRS +, IM, PF, PREFILL SYR, 0.5ML (FLUAD): Performed by: INTERNAL MEDICINE

## 2021-11-19 PROCEDURE — 82043 UR ALBUMIN QUANTITATIVE: CPT

## 2021-11-19 PROCEDURE — G8484 FLU IMMUNIZE NO ADMIN: HCPCS | Performed by: INTERNAL MEDICINE

## 2021-11-19 PROCEDURE — G0008 ADMIN INFLUENZA VIRUS VAC: HCPCS

## 2021-11-19 PROCEDURE — G0463 HOSPITAL OUTPT CLINIC VISIT: HCPCS | Performed by: INTERNAL MEDICINE

## 2021-11-19 PROCEDURE — G8420 CALC BMI NORM PARAMETERS: HCPCS | Performed by: INTERNAL MEDICINE

## 2021-11-19 PROCEDURE — 82570 ASSAY OF URINE CREATININE: CPT

## 2021-11-19 PROCEDURE — G8427 DOCREV CUR MEDS BY ELIG CLIN: HCPCS | Performed by: INTERNAL MEDICINE

## 2021-11-19 PROCEDURE — 99214 OFFICE O/P EST MOD 30 MIN: CPT | Performed by: INTERNAL MEDICINE

## 2021-11-19 PROCEDURE — 36415 COLL VENOUS BLD VENIPUNCTURE: CPT

## 2021-11-19 PROCEDURE — 1036F TOBACCO NON-USER: CPT | Performed by: INTERNAL MEDICINE

## 2021-11-19 PROCEDURE — 90694 VACC AIIV4 NO PRSRV 0.5ML IM: CPT | Performed by: INTERNAL MEDICINE

## 2021-11-19 PROCEDURE — 4040F PNEUMOC VAC/ADMIN/RCVD: CPT | Performed by: INTERNAL MEDICINE

## 2021-11-19 PROCEDURE — G0439 PPPS, SUBSEQ VISIT: HCPCS | Performed by: INTERNAL MEDICINE

## 2021-11-19 PROCEDURE — 84153 ASSAY OF PSA TOTAL: CPT

## 2021-11-19 PROCEDURE — 1123F ACP DISCUSS/DSCN MKR DOCD: CPT | Performed by: INTERNAL MEDICINE

## 2021-11-19 PROCEDURE — 83036 HEMOGLOBIN GLYCOSYLATED A1C: CPT

## 2021-11-19 PROCEDURE — 99397 PER PM REEVAL EST PAT 65+ YR: CPT | Performed by: INTERNAL MEDICINE

## 2021-11-19 RX ORDER — FEXOFENADINE HCL 180 MG/1
180 TABLET ORAL DAILY
Qty: 90 TABLET | Refills: 3 | Status: SHIPPED | OUTPATIENT
Start: 2021-11-19

## 2021-11-19 ASSESSMENT — ENCOUNTER SYMPTOMS
ABDOMINAL PAIN: 0
NAUSEA: 0
CONSTIPATION: 0
BLOOD IN STOOL: 0
VOMITING: 0
SHORTNESS OF BREATH: 0
EYE PAIN: 0
COUGH: 0
DIARRHEA: 0
BACK PAIN: 0

## 2021-11-19 ASSESSMENT — PATIENT HEALTH QUESTIONNAIRE - PHQ9
SUM OF ALL RESPONSES TO PHQ QUESTIONS 1-9: 0
1. LITTLE INTEREST OR PLEASURE IN DOING THINGS: 0
SUM OF ALL RESPONSES TO PHQ9 QUESTIONS 1 & 2: 0
SUM OF ALL RESPONSES TO PHQ QUESTIONS 1-9: 0
2. FEELING DOWN, DEPRESSED OR HOPELESS: 0
SUM OF ALL RESPONSES TO PHQ QUESTIONS 1-9: 0

## 2021-11-19 ASSESSMENT — LIFESTYLE VARIABLES: HOW OFTEN DO YOU HAVE A DRINK CONTAINING ALCOHOL: 0

## 2021-11-19 NOTE — PROGRESS NOTES
Have you had an allergic reaction to the flu (influenza) shot? no  Are you allergic to eggs or any component of the flu vaccine? no  Do you have a history of Guillain-[place] Syndrome (GBS), which is paralysis after receiving the flu vaccine? no  Are you feeling well today? yes  Flu vaccine given as ordered. Patient tolerated it well. No questions re: VIS information.

## 2021-11-19 NOTE — PROGRESS NOTES
Medicare Annual Wellness Visit  Name: Sy Bergeron Date: 2021   MRN: [id number] Sex: Male   Age: 68 y.o. Ethnicity: Non- / Non    : 1945 Race: White (non-)      Mynor Zuniga is here for Medicare AWV (6 month), Hypertension, Hyperlipidemia, and Diabetes    Screenings for behavioral, psychosocial and functional/safety risks, and cognitive dysfunction are all negative except as indicated below. These results, as well as other patient data from the [address] form, are documented in Flowsheets linked to this Encounter. Allergies   Allergen Reactions    Zonisamide Other (See Comments)     ANXIETY         Prior to Visit Medications    Medication Sig Taking? Authorizing Provider   fexofenadine (ALLEGRA) 180 MG tablet Take 1 tablet by mouth daily Take 180 mg by mouth daily Yes Elizabeth Santos MD   levalbuterol (XOPENEX HFA) 45 MCG/ACT inhaler Inhale 1 puff into the lungs every 6 hours as needed for Wheezing or Shortness of Breath  Pamela Gipson MD   metoprolol tartrate (LOPRESSOR) 50 MG tablet take 1 tablet by mouth twice a day  Elizabeth Santos MD   HYDROcodone-acetaminophen (NORCO)  MG per tablet Take 1 tablet by mouth every 6 hours as needed for Pain. Historical Provider, MD   pantoprazole (PROTONIX) 40 MG tablet take 1 tablet by mouth once daily  Elizabeth Santos MD   atorvastatin (LIPITOR) 40 MG tablet take 1 tablet by mouth once daily  Elizabeth Santos MD   clopidogrel (PLAVIX) 75 MG tablet take 1 tablet by mouth once daily  Emil Jama MD   dicyclomine (BENTYL) 10 MG capsule take 1 capsule by mouth four times a day if needed for BOWEL CRAMPING OR BLOATING  Elizabeth Santos MD   ondansetron (ZOFRAN) 4 MG tablet take 1 tablet by mouth every 8 hours if needed for nausea and vomiting  Elizabeth Sanots MD   triamcinolone (KENALOG) 0.1 % cream Apply topically 2 times daily.   Elizabeth Santos MD   nitroGLYCERIN (NITROSTAT) 0.4 MG SL tablet Place 1 tablet under the tongue every 5 minutes as needed for Chest pain  Patient not taking: Reported on 8/19/2021  Andrew Nguyen MD   cyclobenzaprine (FLEXERIL) 10 MG tablet Take 10 mg by mouth 3 times daily as needed for Muscle spasms   Historical Provider, MD   aspirin 81 MG tablet Take 81 mg by mouth daily  Historical Provider, MD         Past Medical History:   Diagnosis Date    Chronic bronchitis ([facility] [address]     DJD (degenerative joint disease)     GERD (gastroesophageal reflux disease)     Hearing loss     Mild    Hyperlipidemia     Hypertension     Osteoarthritis of left knee     Overactive bladder     Prostate cancer ([facility] [address]     Status post radical prostatectomy in 2009. Continues to follow with Dr. Criss Love every 6 months.     Seasonal allergies     Spondylosis     Type II or unspecified type diabetes mellitus without mention of complication, not stated as uncontrolled     Diet controlled       Past Surgical History:   Procedure Laterality Date    CARDIAC SURGERY      COLONOSCOPY  03/15/2002    Dr. Abraham Estrella      july 2015 drug-eluting stents placed 2 to the RCA and one to the left circumflex    OTHER SURGICAL HISTORY  08/25/2009    bilateral L3-4, L4-5, L5-S1 steroid facet injection     [place] COLON CA SCRN NOT HI RSK IND N/A 2/27/2017    COLONOSCOPY performed by Anabel Dawn MD at [address] [place] [address] OR  diverticulosis    PROSTATECTOMY  02/09/2010    Dr. Oli Leslie Left 04/23/2013    TOTAL KNEE ARTHROPLASTY Right 02/06/2018    Dr. Rikki Ortiz/ [place], [place]    UPPER GASTROINTESTINAL ENDOSCOPY  03/15/2002    Dr. Rl Caballero         Family History   Problem Relation Age of Onset    Stroke Father        CareTeam (Including outside providers/suppliers regularly involved in providing care):   Patient Care Team:  Andrew Nguyen MD as PCP - General (Internal Medicine)  Andrew Nguyen MD as PCP - REHABILITATION [facility] Polysaccharide ([id number]) 05/10/2019    Tdap (Boostrix, Adacel) 11/25/2019        Health Maintenance   Topic Date Due    Shingles Vaccine (1 of 2) Never done    COVID-19 Vaccine (3 - Booster for Peck Peter series) 10/23/2021    Annual Wellness Visit (AWV)  11/17/2021    PSA counseling  11/09/2021    Lipid screen  07/07/2022    DTaP/Tdap/Td vaccine (2 - Td or Tdap) 11/25/2029    Flu vaccine  Completed    Pneumococcal 65+ years Vaccine  Completed    Hepatitis C screen  Completed    Hepatitis A vaccine  Aged Out    Hib vaccine  Aged Out    Meningococcal (ACWY) vaccine  Aged Out     Recommendations for Instablogs Due: see orders and patient instructions/AVS.  . Recommended screening schedule for the next 5-10 years is provided to the patient in written form: see Patient Instructions/AVS.    IThomas LPN, 64/28/9619, performed the documented evaluation under the direct supervision of the attending physician. I, Dr. Christine Cardona, directly supervised the performance of this Medicare annual wellness visit.

## 2021-11-19 NOTE — PROGRESS NOTES
[place] [address]  Dept: [phone]  Dept Fax: [phone]  Loc: [phone]     Anahi Roth is a 68 y.o. male who presents today for his medical conditions/complaintsas noted below. Anahi Roth is c/o of   Chief Complaint   Patient presents with    Medicare AWV     6 month    Hypertension    Hyperlipidemia    Diabetes         HPI:     Hypertension  This is a chronic problem. The current episode started more than 1 year ago. The problem has been waxing and waning since onset. The problem is controlled. Pertinent negatives include no chest pain, headaches, neck pain, palpitations or shortness of breath. Hyperlipidemia  This is a chronic problem. The current episode started more than 1 year ago. The problem is controlled. Recent lipid tests were reviewed and are variable. Pertinent negatives include no chest pain or shortness of breath. Diabetes  He presents for his follow-up diabetic visit. He has type 2 diabetes mellitus. His disease course has been fluctuating. Pertinent negatives for hypoglycemia include no confusion, dizziness, headaches, nervousness/anxiousness or pallor. Pertinent negatives for diabetes include no chest pain, no polydipsia, no polyuria and no weakness. Other  This is a recurrent (4-General medical exam) problem. The current episode started today. The problem occurs intermittently. The problem has been waxing and waning. Pertinent negatives include no abdominal pain, arthralgias, chest pain, chills, coughing, fever, headaches, nausea, neck pain, numbness, rash, vomiting or weakness. Hemoglobin A1C (%)   Date Value   07/07/2021 5.7   11/09/2020 5.9   05/06/2020 5.8            Microalb/Crt.  Ratio (mcg/mg creat)   Date Value   11/09/2020 CANNOT BE CALCULATED     LDL Cholesterol (mg/dL)   Date Value   07/07/2021 71   11/08/2019 56   11/06/2018 59 AST (U/L)   Date Value   2021 18     ALT (U/L)   Date Value   2021 15     BUN (mg/dL)   Date Value   2021 19     BP Readings from Last 3 Encounters:   21 130/78   21 130/80   21 (!) 156/68              Past Medical History:   Diagnosis Date    Chronic bronchitis (HCC)     DJD (degenerative joint disease)     GERD (gastroesophageal reflux disease)     Hearing loss     Mild    Hyperlipidemia     Hypertension     Osteoarthritis of left knee     Overactive bladder     Prostate cancer ([address]     Status post radical prostatectomy in . Continues to follow with Dr. Deisy Rubio every 6 months.  Seasonal allergies     Spondylosis     Type II or unspecified type diabetes mellitus without mention of complication, not stated as uncontrolled     Diet controlled      Past Surgical History:   Procedure Laterality Date    CARDIAC SURGERY      COLONOSCOPY  03/15/2002    Dr. [place]gs [place]      2015 drug-eluting stents placed 2 to the RCA and one to the left circumflex    OTHER SURGICAL HISTORY  2009    bilateral L3-4, L4-5, L5-S1 steroid facet injection     [place] COLON CA SCRN NOT HI RSK IND N/A 2017    [facility]  diverticulosis    PROSTATECTOMY  2010    Dr. Navin Hardin Left 2013    TOTAL KNEE ARTHROPLASTY Right 2018    Dr. Mitchell Ortiz/ [facility] [address] [place] [address]    UPPER GASTROINTESTINAL ENDOSCOPY  03/15/2002    Dr. Christopher Ortiz       Family History   Problem Relation Age of Onset    Stroke Father           Social History     Tobacco Use    Smoking status: Former Smoker     Packs/day: 0.50     Years: 20.00     Pack years: 10.00     Quit date: 1987     Years since quittin.5    Smokeless tobacco: Never Used    Tobacco comment: Quit 30 years ago. [facility]   Substance Use Topics    Alcohol use:  No 11/25/2029    Flu vaccine  Completed    Pneumococcal 65+ years Vaccine  Completed    Hepatitis C screen  Completed    Hepatitis A vaccine  Aged Out    Hib vaccine  Aged Out    Meningococcal (ACWY) vaccine  Aged Out       Subjective:      Review of Systems   Constitutional: Negative for chills and fever. HENT: Negative for hearing loss. Eyes: Negative for pain and visual disturbance. Respiratory: Negative for cough and shortness of breath. Cardiovascular: Negative for chest pain, palpitations and leg swelling. Gastrointestinal: Negative for abdominal pain, blood in stool, constipation, diarrhea, nausea and vomiting. Endocrine: Negative for cold intolerance, polydipsia and polyuria. Genitourinary: Negative for difficulty urinating, dysuria and hematuria. Musculoskeletal: Negative for arthralgias, back pain, gait problem and neck pain. Skin: Negative for pallor and rash. Neurological: Negative for dizziness, weakness, numbness and headaches. Hematological: Negative for adenopathy. Does not bruise/bleed easily. Psychiatric/Behavioral: Negative for confusion. The patient is not nervous/anxious. Objective:     Physical Exam  Vitals reviewed. Constitutional:       Appearance: He is well-developed. HENT:      Head: Normocephalic and atraumatic. Eyes:      Pupils: Pupils are equal, round, and reactive to light. Cardiovascular:      Rate and Rhythm: Normal rate and regular rhythm. Heart sounds: No murmur heard. No friction rub. No gallop. Pulmonary:      Effort: Pulmonary effort is normal.      Breath sounds: Normal breath sounds. No wheezing or rales. Abdominal:      General: There is no distension. Palpations: Abdomen is soft. There is no mass. Tenderness: There is no abdominal tenderness. There is no rebound. Musculoskeletal:         General: Normal range of motion. Cervical back: Neck supple.    Lymphadenopathy:      Cervical: No cervical adenopathy. Skin:     General: Skin is warm and dry. Findings: No rash. Neurological:      Mental Status: He is alert and oriented to person, place, and time. Cranial Nerves: No cranial nerve deficit (grossly). Psychiatric:         Thought Content: Thought content normal.        /78 (Site: Left Upper Arm, Position: Sitting, Cuff Size: Large Adult)   Pulse 69   Resp 16   Ht 5' 6\" (1.676 m)   Wt 149 lb (67.6 kg)   BMI 24.05 kg/m²     Assessment:       Diagnosis Orders   1. Routine general medical examination at a health care facility     2. Hyperlipidemia, unspecified hyperlipidemia type     3. Type 2 diabetes mellitus without complication, without long-term current use of insulin ([facility])  Hemoglobin A1C   4. Primary hypertension     5. Medicare annual wellness visit, subsequent     6. Environmental allergies  fexofenadine (ALLEGRA) 180 MG tablet     Test results for this appointment. 10/18/2021 okay glucose at 110.    7/7/2021 normal total cholesterol of 132.    7/7/2021 normal hemoglobin A1c at 5.7. Patient told to continue lisinopril and Plavix. Plan:       Return in about 26 weeks (around 5/20/2022) for Hypertension, Hyperlipidemia, Diabetes. Orders Placed This Encounter   Procedures    INFLUENZA, QUADV, ADJUVANTED, 65 YRS =, IM, PF, PREFILL SYR, 0.5ML (FLUAD)    Hemoglobin A1C     Standing Status:   Future     Standing Expiration Date:   11/19/2022     Orders Placed This Encounter   Medications    fexofenadine (ALLEGRA) 180 MG tablet     Sig: Take 1 tablet by mouth daily Take 180 mg by mouth daily     Dispense:  90 tablet     Refill:  3       Patientgiven educational materials - see patient instructions. Discussed use, benefit,and side effects of prescribed medications. All patient questions answered. Ptvoiced understanding. Reviewed health maintenance. Instructed to continue currentmedications, diet and exercise. Patient agreed with treatment plan.  Follow up asdirected.      Electronically signed by Stormy Judd MD on 11/19/2021 at 1:41 PM

## 2021-11-19 NOTE — PATIENT INSTRUCTIONS
Personalized Preventive Plan for Monie Trevino - 11/19/2021  Medicare offers a range of preventive health benefits. Some of the tests and screenings are paid in full while other may be subject to a deductible, co-insurance, and/or copay. Some of these benefits include a comprehensive review of your medical history including lifestyle, illnesses that may run in your family, and various assessments and screenings as appropriate. After reviewing your medical record and screening and assessments performed today your provider may have ordered immunizations, labs, imaging, and/or referrals for you. A list of these orders (if applicable) as well as your Preventive Care list are included within your After Visit Summary for your review. Other Preventive Recommendations:    · A preventive eye exam performed by an eye specialist is recommended every 1-2 years to screen for glaucoma; cataracts, macular degeneration, and other eye disorders. · A preventive dental visit is recommended every 6 months. · Try to get at least 150 minutes of exercise per week or 10,000 steps per day on a pedometer . · Order or download the FREE \"Exercise & Physical Activity: Your Everyday Guide\" from [employer]. Call 3-214.809.2056 or search [employer] online. · You need 4710-6380 mg of calcium and 8056-5555 IU of vitamin D per day. It is possible to meet your calcium requirement with diet alone, but a vitamin D supplement is usually necessary to meet this goal.  · When exposed to the sun, use a sunscreen that protects against both UVA and UVB radiation with an SPF of 30 or greater. Reapply every 2 to 3 hours or after sweating, drying off with a towel, or swimming. · Always wear a seat belt when traveling in a car. Always wear a helmet when riding a bicycle or motorcycle.

## 2021-11-20 NOTE — PROGRESS NOTES
REASON FOR THE CONSULTATION:  Cough . Chief Complaint   Patient presents with    Follow-up     pt states no change in breathing lately       HISTORY OF PRESENT ILLNESS:    Bret Huizar is a 68y.o. year old male is doing well   No recent exacerbation of bronchiectasis. Is using Acapella for secretion clearance pretreating with Xopenex    Last visit   Patient walks quite ways , lives in old house and can climb 20 stairs to his bedroom but gets winded after climbing stairs . He has cough with no hemoptysis , has grey , yellow colour , quarter of a cup in 1 day . He has albuterol mdi which he uses prn     When he was 6 years hold had severe pna and was hospitalized for 6 weeks . He had chills and fever for years and was [facility] - was told has scar tissue in lung . By late teens chills and fever dec . LUNG CANCER SCREENING     1. CRITERIA MET    []     CT ORDERED  []      2. CRITERIA NOT MET   [x]      3. REFUSED                    []        REASON CRITERIA NOT MET     1. SMOKING LESS THAN 30 PY  []      2. AGE LESS THAN 55 or GREATER 77 YEARS  []      3. QUIT SMOKING 15 YEARS OR GREATER   []      4. RECENT CT WITH IN 11 MONTHS    []      5. LIFE EXPECTANCY < 5 YEARS   []      6.  SIGNS  AND SYMPTOMS OF LUNG CANCER   []         Immunization   Immunization History   Administered Date(s) Administered    COVID-19, Pfizer, PF, 30mcg/0.3mL 04/02/2021, 04/23/2021    Influenza, High Dose (Fluzone 65 yrs and older) 12/23/2015, 11/04/2016, 11/08/2017    Influenza, Quadv, adjuvanted, 65 yrs +, IM, PF (Fluad) 11/19/2021    Influenza, Triv, inactivated, subunit, adjuvanted, IM (Fluad 65 yrs and older) 09/12/2019    Pneumococcal Conjugate 13-valent ([id number]) 12/23/2015, 05/05/2017    Pneumococcal Polysaccharide ([id number]) 05/10/2019    Tdap (Boostrix, Adacel) 11/25/2019        Pneumococcal Vaccine     [x] Up to date    [] Indicated   [] Refused  [] Contraindicated       Influenza Vaccine   [x] Up to date    [x] Indicated   [] Refused  [] Contraindicated        Pulmonary Rehab   [] Completed   [] Indicated   [] Refused  [] Contraindicated   PAST MEDICAL HISTORY:       Diagnosis Date    [facility]     DJD (degenerative joint disease)     GERD (gastroesophageal reflux disease)     Hearing loss     Mild    Hyperlipidemia     Hypertension     Osteoarthritis of left knee     Overactive bladder     Prostate cancer ([facility]     Status post radical prostatectomy in 2009. Continues to follow with Dr. Deisy Rubio every 6 months.  Seasonal allergies     Spondylosis     Type II or unspecified type diabetes mellitus without mention of complication, not stated as uncontrolled     Diet controlled         Family History:       Problem Relation Age of Onset    Stroke Father        SURGICAL HISTORY:   Past Surgical History:   Procedure Laterality Date    CARDIAC SURGERY      COLONOSCOPY  03/15/2002    Dr. Ifeanyi Bucio      july 2015 drug-eluting stents placed 2 to the RCA and one to the left circumflex    OTHER SURGICAL HISTORY  08/25/2009    bilateral L3-4, L4-5, L5-S1 steroid facet injection     [place] COLON CA SCRN NOT HI RSK IND N/A 2/27/2017    COLONOSCOPY performed by [facility]ado [facility] at [facility] OR  diverticulosis    PROSTATECTOMY  02/09/2010    Dr. Navin Hardin Left 04/23/2013    TOTAL KNEE [facility]THROPLASTY Right 02/06/2018    [facility] [facility]  03/15/2002    Dr. Tasia Marinelli:      There  No history of TB or TB exposure. There  No asbestos ,[employer] dust exposure. The patient reports  No coal mine, [place], [place], [facility] exposure. History of travel outside the country No  There  is use of   marijuana No   VapingNo  IV heroinNo  Crack cocaineNo  There  Nohot tub exposure.    Pets -cats No, dogsYes  Birds nitroGLYCERIN (NITROSTAT) 0.4 MG SL tablet Place 1 tablet under the tongue every 5 minutes as needed for Chest pain  Patient not taking: Reported on 8/19/2021 5/15/20   Edgardo Olson MD          Vitals:    11/17/21 1458   BP: 130/80   Pulse: 68   Temp: 98.1 °F (36.7 °C)   SpO2: 98%        Review of Systems -  General ROS: negative for - chills, fatigue, fever or weight loss  ENT ROS: negative for - headaches, oral lesions or sore throat  Cardiovascular ROS: no chest pain , orthopnea or pnd   Gastrointestinal ROS: no abdominal pain, change in bowel habits, or black or bloody stools  Skin - no rash   Neuro - no blurry vision , no loc . No focal weakness   msk - no jt tenderness or swelling    Vascular - no claudication , rest completed and negative   Lymphatic - complete and negative   Hematology - oncology - complete and negative   Allergy immunology - complete and negative    no burning or hematuria      Head and neck atraumatic, normocephalic    Lymph nodes-no cervical, supraclavicular lymphadenopathy    Neck-no JVP elevation    Lungs - Ventilating all lobes  prolonged exp  Rales rt base >left  No bronchial breath sounds. Chest expansion equal bilaterally    CVS- S1, S2 regular. No S3 no S4, no murmurs    Abdomen-nontender, nondistended. Bowel sounds are present. No organomegaly    Lower extremity-no edema    Upper extremity-no edema    Neurological-grossly normal cranial nerves. No overt motor deficit   Thyroid:   Lab Results   Component Value Date    TSH 0.89 07/11/2015         hrct   Impression    Right lower lobe bronchiectasis with peribronchial cuffing and opacification    distal bronchi as well as tree-in-bud nodules in the right lower lobe. Findings suggest infectious/inflammatory bronchopneumonia.  Aspiration is in    the differential.         No evidence of interstitial pulmonary fibrosis.        patient has bronchiectasis right lower lobe greater than left  Basal . He has wheezing and purulent sputum . This was seen in ct abdomen and pelvis lung cuts of 2013 as well . 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 IMPRESSION:     Diagnosis Orders   1. Bronchiectasis without complication (HCC)  levalbuterol (XOPENEX HFA) 45 MCG/ACT inhaler        :                PLAN:      Continue Acapella for secretion clearance  Refill Xopenex which provides better bronchodilator response as compared to albuterol  Follow-up 1 year  Requested Prescriptions     Signed Prescriptions Disp Refills    levalbuterol (XOPENEX HFA) 45 MCG/ACT inhaler 1 each 5     Sig: Inhale 1 puff into the lungs every 6 hours as needed for Wheezing or Shortness of Breath       Medications Discontinued During This Encounter   Medication Reason    albuterol sulfate HFA (VENTOLIN HFA) 108 (90 Base) MCG/ACT inhaler LIST CLEANUP    levalbuterol (XOPENEX HFA) 45 MCG/ACT inhaler Rianna Puga received counseling on the following healthy behaviors: nutrition, exercise and medication adherence    Patient given educational materials : see patient instruction       Discussed use, benefit, and side effects of prescribed medications. Barriers to medication compliance addressed. All patient questions answered. Pt voiced understanding. I hope this updates you on my evaluation and clinical thinking. Thank you for allowing me to participate in his care. Sincerely,      Electronically signed by Jacoby Sung MD on 11/20/2021 at 12:01 AM       Please note that this chart was generated using voice recognition Dragon dictation software. Although every effort was made to ensure the accuracy of this automated transcription, some errors in transcription may have occurred.

## 2021-11-21 LAB
ESTIMATED AVERAGE GLUCOSE: 123 MG/DL
HBA1C MFR BLD: 5.9 % (ref 4–6)

## 2021-11-22 ENCOUNTER — OFFICE VISIT (OUTPATIENT)
Dept: PRIMARY CARE CLINIC | Age: 76
End: 2021-11-22
Payer: MEDICARE

## 2021-11-22 VITALS
TEMPERATURE: 96.9 F | WEIGHT: 154.13 LBS | BODY MASS INDEX: 24.88 KG/M2 | SYSTOLIC BLOOD PRESSURE: 180 MMHG | DIASTOLIC BLOOD PRESSURE: 84 MMHG | HEART RATE: 63 BPM

## 2021-11-22 DIAGNOSIS — M79.671 BILATERAL FOOT PAIN: ICD-10-CM

## 2021-11-22 DIAGNOSIS — M25.562 CHRONIC PAIN OF BOTH KNEES: Primary | ICD-10-CM

## 2021-11-22 DIAGNOSIS — G89.29 CHRONIC PAIN OF BOTH KNEES: Primary | ICD-10-CM

## 2021-11-22 DIAGNOSIS — M79.672 BILATERAL FOOT PAIN: ICD-10-CM

## 2021-11-22 DIAGNOSIS — M25.561 CHRONIC PAIN OF BOTH KNEES: Primary | ICD-10-CM

## 2021-11-22 PROCEDURE — 4040F PNEUMOC VAC/ADMIN/RCVD: CPT | Performed by: FAMILY MEDICINE

## 2021-11-22 PROCEDURE — 99214 OFFICE O/P EST MOD 30 MIN: CPT | Performed by: FAMILY MEDICINE

## 2021-11-22 PROCEDURE — G8427 DOCREV CUR MEDS BY ELIG CLIN: HCPCS | Performed by: FAMILY MEDICINE

## 2021-11-22 PROCEDURE — 1036F TOBACCO NON-USER: CPT | Performed by: FAMILY MEDICINE

## 2021-11-22 PROCEDURE — G8484 FLU IMMUNIZE NO ADMIN: HCPCS | Performed by: FAMILY MEDICINE

## 2021-11-22 PROCEDURE — 99214 OFFICE O/P EST MOD 30 MIN: CPT

## 2021-11-22 PROCEDURE — 1123F ACP DISCUSS/DSCN MKR DOCD: CPT | Performed by: FAMILY MEDICINE

## 2021-11-22 PROCEDURE — G8420 CALC BMI NORM PARAMETERS: HCPCS | Performed by: FAMILY MEDICINE

## 2021-11-22 RX ORDER — PREDNISONE 20 MG/1
TABLET ORAL
Qty: 15 TABLET | Refills: 0 | Status: SHIPPED | OUTPATIENT
Start: 2021-11-22 | End: 2022-05-20

## 2021-11-22 NOTE — PROGRESS NOTES
2021     Alessandro Ac (:  1945) is a 68 y.o. male, here for evaluation of the following medical concerns:    Knee Pain   The incident occurred 5 to 7 days ago. There was no injury mechanism. Pain location: pain in bilateral knee caps and in the tops of the feet. The quality of the pain is described as aching. The pain is moderate. The pain has been constant since onset. Associated symptoms include an inability to bear weight (pain with weight bearing). Associated symptoms comments: Patient has chronic knee and bilateral foot pain. Believes that the weather has caused it to flare in the last weeks. States that he is not able to take NSAIDs due to stomach issues and being on plavix. Tylenol doesn't really seem to help much. The symptoms are aggravated by movement, palpation and weight bearing. He has tried acetaminophen for the symptoms. The treatment provided no relief. Did review patient's med list, allergies, social history,pmhx and pshx today as noted in the record. Review of Systems   Constitutional: Negative for chills, fatigue and fever. Musculoskeletal: Positive for arthralgias, gait problem, joint swelling and myalgias. Prior to Visit Medications    Medication Sig Taking? Authorizing Provider   fexofenadine (ALLEGRA) 180 MG tablet Take 1 tablet by mouth daily Take 180 mg by mouth daily Yes Bhakti Tejada MD   levalbuterol (XOPENEX HFA) 45 MCG/ACT inhaler Inhale 1 puff into the lungs every 6 hours as needed for Wheezing or Shortness of Breath Yes Senia Moseley MD   metoprolol tartrate (LOPRESSOR) 50 MG tablet take 1 tablet by mouth twice a day Yes hBakti Tejada MD   HYDROcodone-acetaminophen (NORCO)  MG per tablet Take 1 tablet by mouth every 6 hours as needed for Pain.  Yes Historical Provider, MD   pantoprazole (PROTONIX) 40 MG tablet take 1 tablet by mouth once daily Yes Bhakti Tejada MD   atorvastatin (LIPITOR) 40 MG tablet take 1 tablet by mouth once daily Yes Priscilla Pickard MD   clopidogrel (PLAVIX) 75 MG tablet take 1 tablet by mouth once daily Yes Janelle Berrios MD   dicyclomine (BENTYL) 10 MG capsule take 1 capsule by mouth four times a day if needed for BOWEL CRAMPING OR BLOATING Yes Priscilla Pickard MD   ondansetron (ZOFRAN) 4 MG tablet take 1 tablet by mouth every 8 hours if needed for nausea and vomiting Yes Priscilla Pickard MD   triamcinolone (KENALOG) 0.1 % cream Apply topically 2 times daily. Yes Priscilla Pickard MD   cyclobenzaprine (FLEXERIL) 10 MG tablet Take 10 mg by mouth 3 times daily as needed for Muscle spasms  Yes Historical Provider, MD   aspirin 81 MG tablet Take 81 mg by mouth daily Yes Historical Provider, MD   nitroGLYCERIN (NITROSTAT) 0.4 MG SL tablet Place 1 tablet under the tongue every 5 minutes as needed for Chest pain  Patient not taking: Reported on 2021  Priscilla Pickard MD        Social History     Tobacco Use    Smoking status: Former Smoker     Packs/day: 0.50     Years: 20.00     Pack years: 10.00     Quit date: 1987     Years since quittin.5    Smokeless tobacco: Never Used    Tobacco comment: Quit 30 years ago. [facility]   Substance Use Topics    Alcohol use: No     Alcohol/week: 0.0 standard drinks        Vitals:    21 1526   BP: (!) 180/84   Pulse: 63   Temp: 96.9 °F (36.1 °C)   Weight: 154 lb 2 oz (69.9 kg)     Estimated body mass index is 24.88 kg/m² as calculated from the following:    Height as of 21: 5' 6\" (1.676 m). Weight as of this encounter: 154 lb 2 oz (69.9 kg). Physical Exam  Vitals and nursing note reviewed. Constitutional:       General: He is not in acute distress. Appearance: He is well-developed. He is not diaphoretic. HENT:      Head: Normocephalic and atraumatic. Eyes:      Conjunctiva/sclera: Conjunctivae normal.   Pulmonary:      Effort: Pulmonary effort is normal.   Musculoskeletal:         General: Swelling and tenderness present.  Normal range of motion. Cervical back: Normal range of motion. Comments: Pain with palpation to the bilateral knees and dorsal aspect of the feet. No effusion noted to the knee joints, but his feet have mild swelling noted. Moves both the knees and ankles in a normal range of motion without difficulty. Skin:     General: Skin is warm and dry. Coloration: Skin is not pale. Findings: No erythema or rash. Neurological:      Mental Status: He is alert and oriented to person, place, and time. Psychiatric:         Behavior: Behavior normal.         Thought Content: Thought content normal.         Judgment: Judgment normal.         ASSESSMENT/PLAN:  Encounter Diagnoses   Name Primary?  Chronic pain of both knees Yes    Bilateral foot pain      Orders Placed This Encounter   Medications    predniSONE (DELTASONE) 20 MG tablet     Si bid for 5 days, 1 qd for 5 days     Dispense:  15 tablet     Refill:  0    DISCONTD: diclofenac sodium (VOLTAREN) 1 % GEL     Sig: Apply topically 2 times daily     Dispense:  150 g     Refill:  2    diclofenac sodium (VOLTAREN) 1 % GEL     Sig: Apply 4 g topically 4 times daily as needed for Pain     Dispense:  150 g     Refill:  2     No orders of the defined types were placed in this encounter. I did give patient a burst of prednisone to help with acute inflammation. Will need to watch his blood sugars while on this medication, but he reports that his blood sugars are relatively well controlled at this time. Patient is given topical diclofenac to use if needed for recurrent inflammation and joint pain. Tylenol 1-2 tabs po q4h prn for pain. Could try over the counter tumeric or Omega 3/omega 6 supplements as anti-inflammatories. Return  if no improvement in symptoms or if any further symptoms arise. No follow-ups on file. An electronic signature was used to authenticate this note.     --Gray Bosworth, DO on 2021 at 3:41 PM

## 2022-01-17 RX ORDER — CLOPIDOGREL BISULFATE 75 MG/1
TABLET ORAL
Qty: 90 TABLET | Refills: 3 | Status: SHIPPED | OUTPATIENT
Start: 2022-01-17

## 2022-01-28 ENCOUNTER — TELEPHONE (OUTPATIENT)
Dept: CARDIOLOGY | Age: 77
End: 2022-01-28

## 2022-01-28 NOTE — TELEPHONE ENCOUNTER
Fax received and scanned, requesting cardiac clearance for Sander L5 transforaminal epidural injection scheduled 2/9/22 with Dr. Roxy Faye--  Asking to hold Plavix for 7 days prior.

## 2022-01-28 NOTE — LETTER
Cardiology Consultation  [facility] [address] [place]  [phone]      01/31/22       Regarding:  Sara Raza  1945      To Whom It May Concern,      Patient is Intermediate Cardiac Risk for planned surgery. Special instructions:  Patient is taking plavix and can be held for 5-7 days prior to procedure and resumed as soon as surgical bleeding risk has resolved. Please continue other meds.         Thank you,      Татьяна Alvarado DO, FACC, RPVI, SANTI, [facility]  Cardiology  [address]    Electronically signed by Татьяна Alvarado DO

## 2022-02-17 ENCOUNTER — OFFICE VISIT (OUTPATIENT)
Dept: CARDIOLOGY | Age: 77
End: 2022-02-17
Payer: MEDICARE

## 2022-02-17 VITALS
SYSTOLIC BLOOD PRESSURE: 131 MMHG | HEART RATE: 56 BPM | WEIGHT: 149 LBS | BODY MASS INDEX: 23.95 KG/M2 | DIASTOLIC BLOOD PRESSURE: 61 MMHG | HEIGHT: 66 IN

## 2022-02-17 DIAGNOSIS — I10 PRIMARY HYPERTENSION: Primary | ICD-10-CM

## 2022-02-17 PROCEDURE — 99214 OFFICE O/P EST MOD 30 MIN: CPT | Performed by: INTERNAL MEDICINE

## 2022-02-17 PROCEDURE — G8427 DOCREV CUR MEDS BY ELIG CLIN: HCPCS | Performed by: INTERNAL MEDICINE

## 2022-02-17 PROCEDURE — G8420 CALC BMI NORM PARAMETERS: HCPCS | Performed by: INTERNAL MEDICINE

## 2022-02-17 PROCEDURE — 93005 ELECTROCARDIOGRAM TRACING: CPT | Performed by: INTERNAL MEDICINE

## 2022-02-17 PROCEDURE — 1036F TOBACCO NON-USER: CPT | Performed by: INTERNAL MEDICINE

## 2022-02-17 PROCEDURE — 93010 ELECTROCARDIOGRAM REPORT: CPT | Performed by: INTERNAL MEDICINE

## 2022-02-17 PROCEDURE — 1123F ACP DISCUSS/DSCN MKR DOCD: CPT | Performed by: INTERNAL MEDICINE

## 2022-02-17 PROCEDURE — G8484 FLU IMMUNIZE NO ADMIN: HCPCS | Performed by: INTERNAL MEDICINE

## 2022-02-17 PROCEDURE — 4040F PNEUMOC VAC/ADMIN/RCVD: CPT | Performed by: INTERNAL MEDICINE

## 2022-02-17 NOTE — PROGRESS NOTES
[place] [address]  [address] [place] [address]  Dept: [phone]    Subjective: The patient is a 68y.o. year old, , male is in the office for a follow up he is stable from cardiac standpoint  Denies any episode of chest pain or discomfort, no dyspnea, orthopnea lower extremity edema  Functionally he remains active however capacity is limited by severe osteoarthritis involving multiple joints  Blood pressure is on high side in the office however patient reports that he is in pain because of left knee osteoarthritis. At home it is running within normal limits. No recent falls  No internal or external bleeding      Past Medical History:   has a past medical history of Chronic bronchitis ([facility] [address] DJD (degenerative joint disease), GERD (gastroesophageal reflux disease), Hearing loss, Hyperlipidemia, Hypertension, Osteoarthritis of left knee, Overactive bladder, Prostate cancer ([facility] [address] Seasonal allergies, Spondylosis, and Type II or unspecified type diabetes mellitus without mention of complication, not stated as uncontrolled. Past Surgical History:   has a past surgical history that includes Upper gastrointestinal endoscopy (03/15/2002); Colonoscopy (03/15/2002); Prostatectomy (02/09/2010); Total knee arthroplasty (Left, 04/23/2013); Tonsillectomy; Cardiac surgery; Coronary angioplasty with stent; other surgical history (08/25/2009); pr colon ca scrn not hi rsk ind (N/A, 2/27/2017); and Total knee arthroplasty (Right, 02/06/2018). Home Medications:  Prior to Admission medications    Medication Sig Start Date End Date Taking?  Authorizing Provider   clopidogrel (PLAVIX) 75 MG tablet take 1 tablet by mouth daily 1/17/22  Yes Tobias Ware, DO   predniSONE (DELTASONE) 20 MG tablet 1 bid for 5 days, 1 qd for 5 days 11/22/21  Yes Jonathan Hernández, DO   diclofenac sodium (VOLTAREN) 1 % GEL Apply 4 g topically 4 times daily as needed for Pain 11/22/21  Yes Teresa Fort, DO   fexofenadine (ALLEGRA) 180 MG tablet Take 1 tablet by mouth daily Take 180 mg by mouth daily 11/19/21  Yes Ever MD Kay   levalbuterol [facility]) 45 MCG/ACT inhaler Inhale 1 puff into the lungs every 6 hours as needed for Wheezing or Shortness of Breath 11/17/21 11/17/22 Yes Rayo Draper MD   metoprolol tartrate (LOPRESSOR) 50 MG tablet take 1 tablet by mouth twice a day 9/13/21  Yes Ever MD Kay   HYDROcodone-acetaminophen (NORCO)  MG per tablet Take 1 tablet by mouth every 6 hours as needed for Pain. Yes Historical Provider, MD   pantoprazole (PROTONIX) 40 MG tablet take 1 tablet by mouth once daily 6/14/21  Yes Ever MD Kay   atorvastatin (LIPITOR) 40 MG tablet take 1 tablet by mouth once daily 5/24/21  Yes Ever MD Kay   dicyclomine (BENTYL) 10 MG capsule take 1 capsule by mouth four times a day if needed for BOWEL CRAMPING OR BLOATING 10/12/20  Yes Ever MD Kay   ondansetron (ZOFRAN) 4 MG tablet take 1 tablet by mouth every 8 hours if needed for nausea and vomiting 5/15/20  Yes Ever MD Kay   triamcinolone (KENALOG) 0.1 % cream Apply topically 2 times daily. 5/15/20  Yes Ever MD Kay   nitroGLYCERIN (NITROSTAT) 0.4 MG SL tablet Place 1 tablet under the tongue every 5 minutes as needed for Chest pain 5/15/20  Yes Ever MD Kay   cyclobenzaprine (FLEXERIL) 10 MG tablet Take 10 mg by mouth 3 times daily as needed for Muscle spasms    Yes Historical Provider, MD   aspirin 81 MG tablet Take 81 mg by mouth daily   Yes Historical Provider, MD       Allergies:  Zonisamide    Social History:   reports that he quit smoking about 34 years ago. He has a 10.00 pack-year smoking history. He has never used smokeless tobacco. He reports that he does not drink alcohol and does not use drugs.     Review of Systems:  · Constitutional: there has been no decline 60%.  3. Basal inferior wall is hypokinetic. 4. Grade II diastolic dysfunction. 5. Normal right ventricular size and function. 6. Mild mitral annular calcification. Mild mitral regurgitation. 7. Mildly sclerotic aortic valve with mild regurgitation. 8. Mild tricuspid regurgitation. RVSP is 39 mm Hg. 9. No pericardial effusion. Last Cath: 7/7/2015 Has 2 RCA and 1 Circ stent. All were Resolute Integrity stents     Lexiscan stress test 9/21/2020     IMPRESSION:  1. Gut artifact inferiorly. No obvious ischemia or infarction. 2. Normal left ventricular ejection fraction 64%, normal wall motion. Labs:     CBC: No results for input(s): WBC, HGB, HCT, PLT in the last 72 hours. BMP: No results for input(s): NA, K, CO2, BUN, CREATININE, LABGLOM, GLUCOSE in the last 72 hours. PT/INR: No results for input(s): PROTIME, INR in the last 72 hours. FASTING LIPID PANEL:  Lab Results   Component Value Date    CHOL 132 07/07/2021    HDL 37 07/07/2021    LDLCHOLESTEROL 71 07/07/2021    TRIG 122 07/07/2021    CHOLHDLRATIO 3.6 07/07/2021     LIVER PROFILE:No results for input(s): AST, ALT, LABALBU in the last 72 hours. IMPRESSION:      CAD with hx of PCI to RCA/LCx in 2015. Xochilt can stress test 09/2021 negative for ischemia. Normal LVEF.    Hypertension  Hyperlipidemia   Osteoarthritis involving multiple joints      Patient Active Problem List   Diagnosis    Hypertension    Prostate cancer ([address]    Hyperlipidemia    Troponin level elevated    Enteritis    CAD (coronary artery disease) SP drug eluting stent 2 stents to RCA and 1 to L circ     NSTEMI (non-ST elevated myocardial infarction) ([address]    Type 2 diabetes mellitus without complication (HCC)    History of colon polyps    Chronic midline low back pain with bilateral sciatica    Bronchiectasis with acute lower respiratory infection ([address]       RECOMMENDATIONS:    Continue medical therapy with aspirin, Plavix, high intensity statin  Blood pressure controlled on metoprolol 50 twice daily  Risk factor modifications discussed with the patient   Educated regarding symptoms of ACS or CHF  Routine follow-up in 6 months or earlier if needed    Tatiana Teixeira MD, [facility] Cardiology Consult           [phone]

## 2022-03-21 ENCOUNTER — TELEPHONE (OUTPATIENT)
Dept: CARDIOLOGY | Age: 77
End: 2022-03-21

## 2022-03-21 DIAGNOSIS — Z01.810 PRE-OPERATIVE CARDIOVASCULAR EXAMINATION: Primary | ICD-10-CM

## 2022-03-22 ENCOUNTER — OFFICE VISIT (OUTPATIENT)
Dept: PRIMARY CARE CLINIC | Age: 77
End: 2022-03-22
Payer: MEDICARE

## 2022-03-22 VITALS
DIASTOLIC BLOOD PRESSURE: 70 MMHG | WEIGHT: 145 LBS | OXYGEN SATURATION: 97 % | SYSTOLIC BLOOD PRESSURE: 138 MMHG | BODY MASS INDEX: 23.4 KG/M2 | HEART RATE: 58 BPM | TEMPERATURE: 98.2 F

## 2022-03-22 DIAGNOSIS — K21.9 GASTROESOPHAGEAL REFLUX DISEASE WITHOUT ESOPHAGITIS: Primary | ICD-10-CM

## 2022-03-22 DIAGNOSIS — R10.9 ABDOMINAL CRAMPING: ICD-10-CM

## 2022-03-22 DIAGNOSIS — R11.0 NAUSEA: ICD-10-CM

## 2022-03-22 PROCEDURE — G8420 CALC BMI NORM PARAMETERS: HCPCS | Performed by: NURSE PRACTITIONER

## 2022-03-22 PROCEDURE — G8427 DOCREV CUR MEDS BY ELIG CLIN: HCPCS | Performed by: NURSE PRACTITIONER

## 2022-03-22 PROCEDURE — 4040F PNEUMOC VAC/ADMIN/RCVD: CPT | Performed by: NURSE PRACTITIONER

## 2022-03-22 PROCEDURE — 99213 OFFICE O/P EST LOW 20 MIN: CPT | Performed by: NURSE PRACTITIONER

## 2022-03-22 PROCEDURE — G8484 FLU IMMUNIZE NO ADMIN: HCPCS | Performed by: NURSE PRACTITIONER

## 2022-03-22 PROCEDURE — 99212 OFFICE O/P EST SF 10 MIN: CPT | Performed by: NURSE PRACTITIONER

## 2022-03-22 PROCEDURE — 1123F ACP DISCUSS/DSCN MKR DOCD: CPT | Performed by: NURSE PRACTITIONER

## 2022-03-22 PROCEDURE — 1036F TOBACCO NON-USER: CPT | Performed by: NURSE PRACTITIONER

## 2022-03-22 RX ORDER — DICYCLOMINE HYDROCHLORIDE 10 MG/1
CAPSULE ORAL
Qty: 30 CAPSULE | Refills: 0 | Status: SHIPPED | OUTPATIENT
Start: 2022-03-22 | End: 2022-05-20 | Stop reason: SDUPTHER

## 2022-03-22 RX ORDER — ONDANSETRON 4 MG/1
TABLET, FILM COATED ORAL
Qty: 30 TABLET | Refills: 0 | Status: SHIPPED | OUTPATIENT
Start: 2022-03-22

## 2022-03-22 ASSESSMENT — PATIENT HEALTH QUESTIONNAIRE - PHQ9
SUM OF ALL RESPONSES TO PHQ QUESTIONS 1-9: 0
SUM OF ALL RESPONSES TO PHQ QUESTIONS 1-9: 0
2. FEELING DOWN, DEPRESSED OR HOPELESS: 0
SUM OF ALL RESPONSES TO PHQ QUESTIONS 1-9: 0
1. LITTLE INTEREST OR PLEASURE IN DOING THINGS: 0
SUM OF ALL RESPONSES TO PHQ QUESTIONS 1-9: 0
SUM OF ALL RESPONSES TO PHQ9 QUESTIONS 1 & 2: 0

## 2022-03-22 ASSESSMENT — ENCOUNTER SYMPTOMS
ABDOMINAL PAIN: 1
VOMITING: 0
WHEEZING: 0
NAUSEA: 1
SHORTNESS OF BREATH: 0
COUGH: 0
DIARRHEA: 1

## 2022-03-22 NOTE — TELEPHONE ENCOUNTER
Cardiology Consultation  [facility]  [address] [place]  [phone]      3/22/22      Regarding:  Joycelyn Bull  1945      To Whom It May Concern,      Patient is Low Cardiac Risk for planned surgery. Special instructions:  Patient is taking aspirin and plavix and can be held for 5-7 days prior to procedure and resumed as soon as surgical bleeding risk has resolved. Please continue other meds.         Thank you,      Dr. Aston Fallon, DO  Cardiology    Electronically signed by Rodrigo Dlilard

## 2022-03-22 NOTE — PROGRESS NOTES
[address] [place] [address]  [address] [place] [address], [id number]  [phone]      HPI:     HPI  Pt presents to the clinic for evaluation of abdominal bloating and gas. He has a significant history of GERD and stomach issues. He is out of his bentyl and has not tried it with this episode. He states that he ate about 1 hour later than usual. He had baked beans, meatloaf, baked potatoes. The abdominal pain and epigastric pain started after this. He denies fevers or constipation. He has diarrhea associated with this. The pain is describes as cramping. He had some nausea. He is requesting a refill of his zofran as he does not have any left. He denies fever concerns. Current Outpatient Medications   Medication Sig Dispense Refill    dicyclomine (BENTYL) 10 MG capsule take 1 capsule by mouth four times a day if needed for BOWEL CRAMPING OR BLOATING 30 capsule 0    ondansetron (ZOFRAN) 4 MG tablet take 1 tablet by mouth every 8 hours if needed for nausea and vomiting 30 tablet 0    clopidogrel (PLAVIX) 75 MG tablet take 1 tablet by mouth daily 90 tablet 3    diclofenac sodium (VOLTAREN) 1 % GEL Apply 4 g topically 4 times daily as needed for Pain 150 g 2    fexofenadine (ALLEGRA) 180 MG tablet Take 1 tablet by mouth daily Take 180 mg by mouth daily 90 tablet 3    levalbuterol (XOPENEX HFA) 45 MCG/ACT inhaler Inhale 1 puff into the lungs every 6 hours as needed for Wheezing or Shortness of Breath 1 each 5    metoprolol tartrate (LOPRESSOR) 50 MG tablet take 1 tablet by mouth twice a day 180 tablet 3    HYDROcodone-acetaminophen (NORCO)  MG per tablet Take 1 tablet by mouth every 6 hours as needed for Pain.  pantoprazole (PROTONIX) 40 MG tablet take 1 tablet by mouth once daily 90 tablet 3    atorvastatin (LIPITOR) 40 MG tablet take 1 tablet by mouth once daily 90 tablet 3    triamcinolone (KENALOG) 0.1 % cream Apply topically 2 times daily.  45 g 3    nitroGLYCERIN (NITROSTAT) 0.4 MG SL tablet Place 1 tablet under the tongue every 5 minutes as needed for Chest pain 25 tablet 3    cyclobenzaprine (FLEXERIL) 10 MG tablet Take 10 mg by mouth 3 times daily as needed for Muscle spasms       aspirin 81 MG tablet Take 81 mg by mouth daily      predniSONE (DELTASONE) 20 MG tablet 1 bid for 5 days, 1 qd for 5 days (Patient not taking: Reported on 3/22/2022) 15 tablet 0     No current facility-administered medications for this visit. Allergies   Allergen Reactions    Zonisamide Other (See Comments)     ANXIETY       All patients pastmedical, surgical, social and family history has been reviewed. Subjective:      Review of Systems   Constitutional: Negative for activity change, appetite change, fatigue and fever. Respiratory: Negative for cough, shortness of breath and wheezing. Cardiovascular: Negative for chest pain and palpitations. Gastrointestinal: Positive for abdominal pain, diarrhea and nausea. Negative for vomiting. Skin: Negative. Objective:      Physical Exam  Vitals and nursing note reviewed. Constitutional:       Appearance: Normal appearance. HENT:      Head: Normocephalic and atraumatic. Cardiovascular:      Rate and Rhythm: Normal rate and regular rhythm. Heart sounds: Normal heart sounds. Pulmonary:      Effort: Pulmonary effort is normal.      Breath sounds: Normal breath sounds. Abdominal:      General: Bowel sounds are normal.      Palpations: Abdomen is soft. Tenderness: There is generalized abdominal tenderness. Skin:     Capillary Refill: Capillary refill takes less than 2 seconds. Neurological:      General: No focal deficit present. Mental Status: He is alert and oriented to person, place, and time. Assessment:       Diagnosis Orders   1. Gastroesophageal reflux disease without esophagitis     2. Abdominal cramping     3. Nausea  ondansetron (ZOFRAN) 4 MG tablet       Plan:       Will refill his bentyl to use PRN as well as the zofran   Advised bland diet  Push fluids  Return is  Symptoms do not improve or worsen   Return PRN   No follow-ups on file. No orders of the defined types were placed in this encounter. Orders Placed This Encounter   Medications    dicyclomine (BENTYL) 10 MG capsule     Sig: take 1 capsule by mouth four times a day if needed for BOWEL CRAMPING OR BLOATING     Dispense:  30 capsule     Refill:  0    ondansetron (ZOFRAN) 4 MG tablet     Sig: take 1 tablet by mouth every 8 hours if needed for nausea and vomiting     Dispense:  30 tablet     Refill:  0       Patient given educational materials - see patient instructions. All patient questionsanswered. Pt voiced understanding. Reviewed health maintenance.      Electronically signed by PAYTON Goodson CNP, CNP on 3/22/2022 at 5:24 PM

## 2022-04-26 ENCOUNTER — TELEPHONE (OUTPATIENT)
Dept: CARDIOLOGY | Age: 77
End: 2022-04-26

## 2022-05-10 DIAGNOSIS — E78.49 OTHER HYPERLIPIDEMIA: ICD-10-CM

## 2022-05-10 RX ORDER — ATORVASTATIN CALCIUM 40 MG/1
TABLET, FILM COATED ORAL
Qty: 90 TABLET | Refills: 3 | Status: SHIPPED | OUTPATIENT
Start: 2022-05-10

## 2022-05-20 ENCOUNTER — HOSPITAL ENCOUNTER (EMERGENCY)
Age: 77
Discharge: LEFT BEFORE DISCHARGE | End: 2022-05-20
Attending: EMERGENCY MEDICINE

## 2022-05-20 ENCOUNTER — OFFICE VISIT (OUTPATIENT)
Dept: INTERNAL MEDICINE | Age: 77
End: 2022-05-20
Payer: MEDICARE

## 2022-05-20 ENCOUNTER — HOSPITAL ENCOUNTER (OUTPATIENT)
Dept: LAB | Age: 77
Discharge: HOME | End: 2022-05-20
Payer: MEDICARE

## 2022-05-20 VITALS
HEART RATE: 71 BPM | SYSTOLIC BLOOD PRESSURE: 130 MMHG | DIASTOLIC BLOOD PRESSURE: 74 MMHG | HEIGHT: 66 IN | RESPIRATION RATE: 16 BRPM | BODY MASS INDEX: 21.86 KG/M2 | WEIGHT: 136 LBS

## 2022-05-20 DIAGNOSIS — C61 PROSTATIC ADENOCARCINOMA (HCC): ICD-10-CM

## 2022-05-20 DIAGNOSIS — E11.9 TYPE 2 DIABETES MELLITUS WITHOUT COMPLICATION, WITHOUT LONG-TERM CURRENT USE OF INSULIN (HCC): ICD-10-CM

## 2022-05-20 DIAGNOSIS — R11.0 NAUSEA: ICD-10-CM

## 2022-05-20 DIAGNOSIS — E78.5 HYPERLIPIDEMIA, UNSPECIFIED HYPERLIPIDEMIA TYPE: ICD-10-CM

## 2022-05-20 DIAGNOSIS — I10 PRIMARY HYPERTENSION: Primary | ICD-10-CM

## 2022-05-20 PROCEDURE — 1036F TOBACCO NON-USER: CPT | Performed by: INTERNAL MEDICINE

## 2022-05-20 PROCEDURE — 1123F ACP DISCUSS/DSCN MKR DOCD: CPT | Performed by: INTERNAL MEDICINE

## 2022-05-20 PROCEDURE — 99214 OFFICE O/P EST MOD 30 MIN: CPT | Performed by: INTERNAL MEDICINE

## 2022-05-20 PROCEDURE — G8427 DOCREV CUR MEDS BY ELIG CLIN: HCPCS | Performed by: INTERNAL MEDICINE

## 2022-05-20 PROCEDURE — 36415 COLL VENOUS BLD VENIPUNCTURE: CPT

## 2022-05-20 PROCEDURE — 83036 HEMOGLOBIN GLYCOSYLATED A1C: CPT

## 2022-05-20 PROCEDURE — G8420 CALC BMI NORM PARAMETERS: HCPCS | Performed by: INTERNAL MEDICINE

## 2022-05-20 RX ORDER — DICYCLOMINE HYDROCHLORIDE 10 MG/1
CAPSULE ORAL
Qty: 30 CAPSULE | Refills: 2 | Status: SHIPPED | OUTPATIENT
Start: 2022-05-20 | End: 2022-08-30 | Stop reason: SDUPTHER

## 2022-05-20 SDOH — ECONOMIC STABILITY: FOOD INSECURITY: WITHIN THE PAST 12 MONTHS, THE FOOD YOU BOUGHT JUST DIDN'T LAST AND YOU DIDN'T HAVE MONEY TO GET MORE.: NEVER TRUE

## 2022-05-20 SDOH — ECONOMIC STABILITY: FOOD INSECURITY: WITHIN THE PAST 12 MONTHS, YOU WORRIED THAT YOUR FOOD WOULD RUN OUT BEFORE YOU GOT MONEY TO BUY MORE.: NEVER TRUE

## 2022-05-20 ASSESSMENT — ENCOUNTER SYMPTOMS
ABDOMINAL PAIN: 0
BACK PAIN: 0
COUGH: 0
NAUSEA: 0
VOMITING: 0
CONSTIPATION: 0
DIARRHEA: 0
SHORTNESS OF BREATH: 0
BLOOD IN STOOL: 0
EYE PAIN: 0

## 2022-05-20 ASSESSMENT — SOCIAL DETERMINANTS OF HEALTH (SDOH): HOW HARD IS IT FOR YOU TO PAY FOR THE VERY BASICS LIKE FOOD, HOUSING, MEDICAL CARE, AND HEATING?: NOT HARD AT ALL

## 2022-05-20 NOTE — PROGRESS NOTES
[place] [address]  Dept: [phone]  Dept Fax: [phone]  Loc: [phone]     Arthur Nichole is a 68 y.o. male who presents today for his medical conditions/complaintsas noted below. Arthur Nichole is c/o of   Chief Complaint   Patient presents with    Hypertension     6 month    Hyperlipidemia         HPI:     Hypertension  This is a chronic problem. The current episode started more than 1 year ago. The problem has been waxing and waning since onset. The problem is controlled. Pertinent negatives include no chest pain, headaches, neck pain, palpitations or shortness of breath. Hyperlipidemia  This is a chronic problem. The current episode started more than 1 year ago. The problem is controlled. Recent lipid tests were reviewed and are variable. Pertinent negatives include no chest pain or shortness of breath. Diabetes  He presents for his follow-up diabetic visit. He has type 2 diabetes mellitus. His disease course has been fluctuating. Pertinent negatives for hypoglycemia include no confusion, dizziness, headaches, nervousness/anxiousness or pallor. Pertinent negatives for diabetes include no chest pain, no polydipsia, no polyuria and no weakness. Hemoglobin A1C (%)   Date Value   11/19/2021 5.9   07/07/2021 5.7   11/09/2020 5.9            Microalb/Crt.  Ratio (mcg/mg creat)   Date Value   11/19/2021 Can not be calculated     LDL Cholesterol (mg/dL)   Date Value   07/07/2021 71   11/08/2019 56   11/06/2018 59         AST (U/L)   Date Value   07/07/2021 18     ALT (U/L)   Date Value   07/07/2021 15     BUN (mg/dL)   Date Value   07/07/2021 19     BP Readings from Last 3 Encounters:   05/20/22 130/74   03/22/22 138/70   02/17/22 131/61              Past Medical History:   Diagnosis Date    Chronic bronchitis (HCC)     DJD (degenerative joint disease)     GERD (gastroesophageal reflux disease)     Hearing loss     Mild    Hyperlipidemia     Hypertension     Osteoarthritis of left knee     Overactive bladder     Prostate cancer ([address]     Status post radical prostatectomy in . Continues to follow with Dr. Rodrigue Jean every 6 months.  Seasonal allergies     Spondylosis     Type II or unspecified type diabetes mellitus without mention of complication, not stated as uncontrolled     Diet controlled      Past Surgical History:   Procedure Laterality Date    CARDIAC SURGERY      COLONOSCOPY  03/15/2002    Dr. Diana Wayne      2015 drug-eluting stents placed 2 to the RCA and one to the left circumflex    OTHER SURGICAL HISTORY  2009    bilateral L3-4, L4-5, L5-S1 steroid facet injection     GA COLON CA SCRN NOT HI RSK IND N/A 2017    COL[facility] Arnoldo Claude[facility]  diverticulosis    PROSTATECTOMY  2010    Dr. Tulio Swartz Left 2013    TOTAL KNEE ARTHROPLASTY Right 2018    [facility]. Becky Ortiz[facility] [address] [place] [address]    UPPER GASTROINTESTINAL ENDOSCOPY  03/15/2002    Dr. Zeinab Jang       Family History   Problem Relation Age of Onset    Stroke Father           Social History     Tobacco Use    Smoking status: Former Smoker     Packs/day: 0.50     Years: 20.00     Pack years: 10.00     Quit date: 1987     Years since quittin.0    Smokeless tobacco: Never Used    Tobacco comment: Quit 30 years ago.  [facility]   Substance Use Topics    Alcohol use: No     Alcohol/week: 0.0 standard drinks         Current Outpatient Medications   Medication Sig Dispense Refill    dicyclomine (BENTYL) 10 MG capsule take 1 capsule by mouth four times a day if needed for BOWEL CRAMPING OR BLOATING 30 capsule 2    atorvastatin (LIPITOR) 40 MG tablet take 1 tablet by mouth once daily 90 tablet 3    ondansetron (ZOFRAN) 4 MG tablet take 1 tablet by mouth every 8 hours if needed for nausea and vomiting 30 tablet 0    clopidogrel (PLAVIX) 75 MG tablet take 1 tablet by mouth daily 90 tablet 3    diclofenac sodium (VOLTAREN) 1 % GEL Apply 4 g topically 4 times daily as needed for Pain 150 g 2    fexofenadine (ALLEGRA) 180 MG tablet Take 1 tablet by mouth daily Take 180 mg by mouth daily 90 tablet 3    levalbuterol (XOPENEX HFA) 45 MCG/ACT inhaler Inhale 1 puff into the lungs every 6 hours as needed for Wheezing or Shortness of Breath 1 each 5    metoprolol tartrate (LOPRESSOR) 50 MG tablet take 1 tablet by mouth twice a day 180 tablet 3    HYDROcodone-acetaminophen (NORCO)  MG per tablet Take 1 tablet by mouth every 6 hours as needed for Pain.  pantoprazole (PROTONIX) 40 MG tablet take 1 tablet by mouth once daily 90 tablet 3    triamcinolone (KENALOG) 0.1 % cream Apply topically 2 times daily. 45 g 3    nitroGLYCERIN (NITROSTAT) 0.4 MG SL tablet Place 1 tablet under the tongue every 5 minutes as needed for Chest pain 25 tablet 3    cyclobenzaprine (FLEXERIL) 10 MG tablet Take 10 mg by mouth 3 times daily as needed for Muscle spasms       aspirin 81 MG tablet Take 81 mg by mouth daily       No current facility-administered medications for this visit.      Allergies   Allergen Reactions    Zonisamide Other (See Comments)     ANXIETY       Health Maintenance   Topic Date Due    Shingles vaccine (1 of 2) Never done    Lipids  07/07/2022    Annual Wellness Visit (AWV)  11/20/2022    Prostate Specific Antigen (PSA) Screening or Monitoring  12/23/2022    Depression Screen  03/22/2023    DTaP/Tdap/Td vaccine (2 - Td or Tdap) 11/25/2029    Flu vaccine  Completed    Pneumococcal 65+ years Vaccine  Completed    COVID-19 Vaccine  Completed    Hepatitis C screen  Completed    Hepatitis A vaccine  Aged Out    Hib vaccine  Aged Out    Meningococcal (ACWY) vaccine  Aged Out       Subjective:      Review of Systems   Constitutional: Negative for chills and fever. HENT: Negative for hearing loss. Eyes: Negative for pain and visual disturbance. Respiratory: Negative for cough and shortness of breath. Cardiovascular: Negative for chest pain, palpitations and leg swelling. Gastrointestinal: Negative for abdominal pain, blood in stool, constipation, diarrhea, nausea and vomiting. Endocrine: Negative for cold intolerance, polydipsia and polyuria. Genitourinary: Negative for difficulty urinating, dysuria and hematuria. Musculoskeletal: Negative for arthralgias, back pain, gait problem and neck pain. Skin: Negative for pallor and rash. Neurological: Negative for dizziness, weakness, numbness and headaches. Hematological: Negative for adenopathy. Does not bruise/bleed easily. Psychiatric/Behavioral: Negative for confusion. The patient is not nervous/anxious. Objective:     Physical Exam  Vitals reviewed. Constitutional:       Appearance: He is well-developed. HENT:      Head: Normocephalic and atraumatic. Eyes:      Pupils: Pupils are equal, round, and reactive to light. Cardiovascular:      Rate and Rhythm: Normal rate and regular rhythm. Heart sounds: No murmur heard. No friction rub. No gallop. Pulmonary:      Effort: Pulmonary effort is normal.      Breath sounds: Normal breath sounds. No wheezing or rales. Abdominal:      General: There is no distension. Palpations: Abdomen is soft. There is no mass. Tenderness: There is no abdominal tenderness. There is no rebound. Musculoskeletal:         General: Normal range of motion. Cervical back: Neck supple. Lymphadenopathy:      Cervical: No cervical adenopathy. Skin:     General: Skin is warm and dry. Findings: No rash. Neurological:      Mental Status: He is alert and oriented to person, place, and time. Cranial Nerves: No cranial nerve deficit (grossly). Psychiatric:         Thought Content:  Thought content normal.        /74 (Site: Left Upper Arm, Position: Sitting, Cuff Size: Large Adult)   Pulse 71   Resp 16   Ht 5' 6\" (1.676 m)   Wt 136 lb (61.7 kg)   BMI 21.95 kg/m²     Assessment:       Diagnosis Orders   1. Primary hypertension  Comprehensive Metabolic Panel   2. Type 2 diabetes mellitus without complication, without long-term current use of insulin (HCC)  Hemoglobin A1C    Microalbumin, Ur   3. Hyperlipidemia, unspecified hyperlipidemia type  Lipid Panel   4. Nausea  dicyclomine (BENTYL) 10 MG capsule   5. Prostatic adenocarcinoma ([facility] [address]  PSA, Diagnostic   Reviewed multiple test results for this appointment. 12/23/2021 normal diagnostic PSA less than 0.01    11/19/2021 normal A1c 5.9.    11/19/2021 normal microalbumin less than 12. Patient told to continue Lipitor and Plavix. Plan:       Return in about 6 months (around 11/21/2022) for medicare AWV, Hypertension, Hyperlipidemia, Diabetes. Orders Placed This Encounter   Procedures    Comprehensive Metabolic Panel     Standing Status:   Future     Standing Expiration Date:   5/20/2023    PSA, Diagnostic     Standing Status:   Future     Standing Expiration Date:   5/20/2023    Hemoglobin A1C     Standing Status:   Future     Standing Expiration Date:   5/20/2023    Microalbumin, Ur     Standing Status:   Future     Standing Expiration Date:   5/20/2023    Lipid Panel     Standing Status:   Future     Standing Expiration Date:   5/20/2023     Order Specific Question:   Is Patient Fasting?/# of Hours     Answer:   yes     Orders Placed This Encounter   Medications    dicyclomine (BENTYL) 10 MG capsule     Sig: take 1 capsule by mouth four times a day if needed for BOWEL CRAMPING OR BLOATING     Dispense:  30 capsule     Refill:  2       Patientgiven educational materials - see patient instructions. Discussed use, benefit,and side effects of prescribed medications. All patient questions answered. Ptvoiced understanding. Reviewed health maintenance. Instructed to continue currentmedications, diet and exercise. Patient agreed with treatment plan. Follow up asdirected.      Electronically signed by Dangelo Floyd MD on 5/20/2022 at 2:49 PM

## 2022-05-22 LAB
ESTIMATED AVERAGE GLUCOSE: 117 MG/DL
HBA1C MFR BLD: 5.7 % (ref 4–6)

## 2022-06-03 RX ORDER — PANTOPRAZOLE SODIUM 40 MG/1
TABLET, DELAYED RELEASE ORAL
Qty: 90 TABLET | Refills: 3 | Status: SHIPPED | OUTPATIENT
Start: 2022-06-03

## 2022-06-07 RX ORDER — MELOXICAM 15 MG/1
15 TABLET ORAL DAILY
COMMUNITY

## 2022-08-13 ENCOUNTER — OFFICE VISIT (OUTPATIENT)
Dept: PRIMARY CARE CLINIC | Age: 77
End: 2022-08-13
Payer: MEDICARE

## 2022-08-13 ENCOUNTER — HOSPITAL ENCOUNTER (OUTPATIENT)
Age: 77
Discharge: HOME | End: 2022-08-15
Payer: MEDICARE

## 2022-08-13 ENCOUNTER — HOSPITAL ENCOUNTER (OUTPATIENT)
Dept: GENERAL RADIOLOGY | Age: 77
Discharge: HOME | End: 2022-08-15
Payer: MEDICARE

## 2022-08-13 VITALS
OXYGEN SATURATION: 98 % | BODY MASS INDEX: 22.44 KG/M2 | TEMPERATURE: 98.2 F | DIASTOLIC BLOOD PRESSURE: 74 MMHG | SYSTOLIC BLOOD PRESSURE: 126 MMHG | WEIGHT: 139 LBS | HEART RATE: 74 BPM
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
## 2022-08-13 DIAGNOSIS — M62.838 MUSCLE SPASM: ICD-10-CM

## 2022-08-13 DIAGNOSIS — M54.50 ACUTE BILATERAL LOW BACK PAIN WITHOUT SCIATICA: ICD-10-CM
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
## 2022-08-13 PROCEDURE — G8427 DOCREV CUR MEDS BY ELIG CLIN: HCPCS | Performed by: NURSE PRACTITIONER

## 2022-08-13 PROCEDURE — 1123F ACP DISCUSS/DSCN MKR DOCD: CPT | Performed by: NURSE PRACTITIONER

## 2022-08-13 PROCEDURE — 99212 OFFICE O/P EST SF 10 MIN: CPT | Performed by: NURSE PRACTITIONER

## 2022-08-13 PROCEDURE — 99213 OFFICE O/P EST LOW 20 MIN: CPT | Performed by: NURSE PRACTITIONER

## 2022-08-13 PROCEDURE — G8420 CALC BMI NORM PARAMETERS: HCPCS | Performed by: NURSE PRACTITIONER

## 2022-08-13 PROCEDURE — 1036F TOBACCO NON-USER: CPT | Performed by: NURSE PRACTITIONER

## 2022-08-13 PROCEDURE — 72100 X-RAY EXAM L-S SPINE 2/3 VWS: CPT

## 2022-08-13 PROCEDURE — 72220 X-RAY EXAM SACRUM TAILBONE: CPT

## 2022-08-13 RX ORDER — PREDNISONE 20 MG/1
20 TABLET ORAL 2 TIMES DAILY
Qty: 10 TABLET | Refills: 0 | Status: SHIPPED | OUTPATIENT
Start: 2022-08-13 | End: 2022-08-18

## 2022-08-13 ASSESSMENT — PATIENT HEALTH QUESTIONNAIRE - PHQ9
SUM OF ALL RESPONSES TO PHQ QUESTIONS 1-9: 0
2. FEELING DOWN, DEPRESSED OR HOPELESS: 0
SUM OF ALL RESPONSES TO PHQ9 QUESTIONS 1 & 2: 0
SUM OF ALL RESPONSES TO PHQ QUESTIONS 1-9: 0
1. LITTLE INTEREST OR PLEASURE IN DOING THINGS: 0
SUM OF ALL RESPONSES TO PHQ QUESTIONS 1-9: 0
SUM OF ALL RESPONSES TO PHQ QUESTIONS 1-9: 0

## 2022-08-13 ASSESSMENT — ENCOUNTER SYMPTOMS
COUGH: 0
BOWEL INCONTINENCE: 0
BACK PAIN: 1
WHEEZING: 0
SHORTNESS OF BREATH: 0

## 2022-08-13 NOTE — PROGRESS NOTES
[address] [facility]  [address] [facility], [id number]  [phone]      HPI:     Back Pain  This is a chronic problem. The current episode started in the past 7 days. The problem occurs daily. The problem is unchanged. The pain is present in the lumbar spine. The quality of the pain is described as aching and burning. The pain radiates to the left thigh and right thigh. The pain is at a severity of 8/10. The pain is severe. The symptoms are aggravated by lying down, position and twisting. Pertinent negatives include no bladder incontinence, bowel incontinence, chest pain, fever, numbness, paresthesias, tingling or weakness. He has tried muscle relaxant (norco, flexeril) for the symptoms. The treatment provided moderate relief. Current Outpatient Medications   Medication Sig Dispense Refill    predniSONE (DELTASONE) 20 MG tablet Take 1 tablet by mouth in the morning and 1 tablet before bedtime. Do all this for 5 days.  10 tablet 0    meloxicam (MOBIC) 15 MG tablet Take 15 mg by mouth daily      pantoprazole (PROTONIX) 40 MG tablet take 1 tablet by mouth once daily 90 tablet 3    dicyclomine (BENTYL) 10 MG capsule take 1 capsule by mouth four times a day if needed for BOWEL CRAMPING OR BLOATING 30 capsule 2    atorvastatin (LIPITOR) 40 MG tablet take 1 tablet by mouth once daily 90 tablet 3    ondansetron (ZOFRAN) 4 MG tablet take 1 tablet by mouth every 8 hours if needed for nausea and vomiting 30 tablet 0    clopidogrel (PLAVIX) 75 MG tablet take 1 tablet by mouth daily 90 tablet 3    diclofenac sodium (VOLTAREN) 1 % GEL Apply 4 g topically 4 times daily as needed for Pain 150 g 2    fexofenadine (ALLEGRA) 180 MG tablet Take 1 tablet by mouth daily Take 180 mg by mouth daily 90 tablet 3    levalbuterol (XOPENEX HFA) 45 MCG/ACT inhaler Inhale 1 puff into the lungs every 6 hours as needed for Wheezing or Shortness of Breath 1 each 5    metoprolol tartrate (LOPRESSOR) 50 MG tablet take 1 tablet by mouth twice a day 180 tablet 3    HYDROcodone-acetaminophen (NORCO)  MG per tablet Take 1 tablet by mouth every 6 hours as needed for Pain.      triamcinolone (KENALOG) 0.1 % cream Apply topically 2 times daily. 45 g 3    nitroGLYCERIN (NITROSTAT) 0.4 MG SL tablet Place 1 tablet under the tongue every 5 minutes as needed for Chest pain 25 tablet 3    cyclobenzaprine (FLEXERIL) 10 MG tablet Take 10 mg by mouth 3 times daily as needed for Muscle spasms       aspirin 81 MG tablet Take 81 mg by mouth daily       No current facility-administered medications for this visit. Allergies   Allergen Reactions    Zonisamide Other (See Comments)     ANXIETY       All patients pastmedical, surgical, social and family history has been reviewed. Subjective:      Review of Systems   Constitutional:  Positive for activity change. Negative for appetite change, fatigue and fever. Respiratory:  Negative for cough, shortness of breath and wheezing. Cardiovascular:  Negative for chest pain and palpitations. Gastrointestinal:  Negative for bowel incontinence. Genitourinary:  Negative for bladder incontinence. Musculoskeletal:  Positive for back pain and myalgias. Neurological:  Negative for tingling, weakness, numbness and paresthesias. Objective:      Physical Exam  Vitals and nursing note reviewed. Constitutional:       Appearance: Normal appearance. HENT:      Head: Normocephalic and atraumatic. Musculoskeletal:      Thoracic back: Spasms and tenderness present. Lumbar back: Positive right straight leg raise test and positive left straight leg raise test.        Back:    Skin:     Capillary Refill: Capillary refill takes less than 2 seconds. Neurological:      General: No focal deficit present. Mental Status: He is alert and oriented to person, place, and time. Assessment:       Diagnosis Orders   1.  Fall, initial encounter  XR LUMBAR SPINE (2-3 VIEWS)    XR SACRUM COCCYX (MIN 2 VIEWS)      2. Acute bilateral low back pain without sciatica  XR LUMBAR SPINE (2-3 VIEWS)    XR SACRUM COCCYX (MIN 2 VIEWS)      3. Muscle spasm            Plan: Will xray the lower back  Will call tomorrow with results  Suspect muscle spasm  Continue norco and flexeril as needed   Heat/ice to affected area  Return if symptoms do not improve or worsen   Return PRN   No follow-ups on file. Orders Placed This Encounter   Procedures    XR LUMBAR SPINE (2-3 VIEWS)     Standing Status:   Future     Standing Expiration Date:   8/13/2023     Order Specific Question:   Reason for exam:     Answer:   fall about 1 week ago    XR SACRUM COCCYX (MIN 2 VIEWS)     Standing Status:   Future     Standing Expiration Date:   8/13/2023     Order Specific Question:   Reason for exam:     Answer:   fall, low back pain     Orders Placed This Encounter   Medications    predniSONE (DELTASONE) 20 MG tablet     Sig: Take 1 tablet by mouth in the morning and 1 tablet before bedtime. Do all this for 5 days. Dispense:  10 tablet     Refill:  0       Patient given educational materials - see patient instructions. All patient questionsanswered. Pt voiced understanding. Reviewed health maintenance.      Electronically signed by PAYTON Roberts CNP, CNP on 8/13/2022 at 5:10 PM

## 2022-08-18 ENCOUNTER — OFFICE VISIT (OUTPATIENT)
Dept: CARDIOLOGY | Age: 77
End: 2022-08-18
Payer: MEDICARE

## 2022-08-18 VITALS
BODY MASS INDEX: 22.6 KG/M2 | SYSTOLIC BLOOD PRESSURE: 152 MMHG | HEART RATE: 69 BPM | OXYGEN SATURATION: 98 % | WEIGHT: 140 LBS | DIASTOLIC BLOOD PRESSURE: 70 MMHG | RESPIRATION RATE: 16 BRPM

## 2022-08-18 DIAGNOSIS — I21.4 NSTEMI (NON-ST ELEVATED MYOCARDIAL INFARCTION) (HCC): ICD-10-CM

## 2022-08-18 DIAGNOSIS — I10 PRIMARY HYPERTENSION: Primary | ICD-10-CM

## 2022-08-18 DIAGNOSIS — I25.10 CORONARY ARTERY DISEASE INVOLVING NATIVE CORONARY ARTERY OF NATIVE HEART WITHOUT ANGINA PECTORIS: ICD-10-CM

## 2022-08-18 DIAGNOSIS — Z01.810 PREPROCEDURAL CARDIOVASCULAR EXAMINATION: ICD-10-CM

## 2022-08-18 DIAGNOSIS — J47.9 BRONCHIECTASIS WITHOUT COMPLICATION (HCC): ICD-10-CM

## 2022-08-18 DIAGNOSIS — E78.5 HYPERLIPIDEMIA, UNSPECIFIED HYPERLIPIDEMIA TYPE: ICD-10-CM

## 2022-08-18 DIAGNOSIS — I10 ESSENTIAL HYPERTENSION: ICD-10-CM

## 2022-08-18 DIAGNOSIS — E78.2 MIXED HYPERLIPIDEMIA: ICD-10-CM

## 2022-08-18 DIAGNOSIS — Z01.810 PRE-OPERATIVE CARDIOVASCULAR EXAMINATION: ICD-10-CM

## 2022-08-18 PROCEDURE — 99214 OFFICE O/P EST MOD 30 MIN: CPT | Performed by: INTERNAL MEDICINE

## 2022-08-18 PROCEDURE — G8420 CALC BMI NORM PARAMETERS: HCPCS | Performed by: INTERNAL MEDICINE

## 2022-08-18 PROCEDURE — 1036F TOBACCO NON-USER: CPT | Performed by: INTERNAL MEDICINE

## 2022-08-18 PROCEDURE — 1123F ACP DISCUSS/DSCN MKR DOCD: CPT | Performed by: INTERNAL MEDICINE

## 2022-08-18 PROCEDURE — 93010 ELECTROCARDIOGRAM REPORT: CPT | Performed by: INTERNAL MEDICINE

## 2022-08-18 PROCEDURE — 93005 ELECTROCARDIOGRAM TRACING: CPT | Performed by: INTERNAL MEDICINE

## 2022-08-18 PROCEDURE — G8427 DOCREV CUR MEDS BY ELIG CLIN: HCPCS | Performed by: INTERNAL MEDICINE

## 2022-08-18 RX ORDER — CYCLOBENZAPRINE HCL 10 MG
10 TABLET ORAL 3 TIMES DAILY PRN
Qty: 21 TABLET | Refills: 0 | Status: SHIPPED | OUTPATIENT
Start: 2022-08-18

## 2022-08-18 RX ORDER — LEVALBUTEROL TARTRATE 45 UG/1
1 AEROSOL, METERED ORAL EVERY 6 HOURS PRN
Qty: 1 EACH | Refills: 5 | Status: SHIPPED | OUTPATIENT
Start: 2022-08-18 | End: 2023-08-18

## 2022-08-18 RX ORDER — FEXOFENADINE HCL 180 MG/1
180 TABLET ORAL PRN
COMMUNITY

## 2022-08-18 ASSESSMENT — PATIENT HEALTH QUESTIONNAIRE - PHQ9
SUM OF ALL RESPONSES TO PHQ QUESTIONS 1-9: 0
2. FEELING DOWN, DEPRESSED OR HOPELESS: 0
SUM OF ALL RESPONSES TO PHQ9 QUESTIONS 1 & 2: 0
SUM OF ALL RESPONSES TO PHQ QUESTIONS 1-9: 0
SUM OF ALL RESPONSES TO PHQ QUESTIONS 1-9: 0
1. LITTLE INTEREST OR PLEASURE IN DOING THINGS: 0
SUM OF ALL RESPONSES TO PHQ QUESTIONS 1-9: 0

## 2022-08-18 NOTE — PROGRESS NOTES
[place] [address]  [address] [facility] [address]  Dept: [phone]    Subjective: The patient is a 68y.o. year old, , male is in the office for a follow up and preop risk stratification prior to urinary sphincter replacement. he is stable from cardiac standpoint  Denies any episode of chest pain or discomfort, no dyspnea, orthopnea lower extremity edema  Functionally he remains active however capacity is limited by severe osteoarthritis involving multiple joints  No recent falls  No internal or external bleeding  BP is high in office, reports he has been in pain from arthritis and also took meds late, BP at home has been normal according to the patient         Past Medical History:   has a past medical history of Chronic bronchitis ([address] DJD (degenerative joint disease), GERD (gastroesophageal reflux disease), Hearing loss, Hyperlipidemia, Hypertension, Osteoarthritis of left knee, Overactive bladder, Prostate cancer ([address] Seasonal allergies, Spondylosis, and Type II or unspecified type diabetes mellitus without mention of complication, not stated as uncontrolled. Past Surgical History:   has a past surgical history that includes Upper gastrointestinal endoscopy (03/15/2002); Colonoscopy (03/15/2002); Prostatectomy (02/09/2010); Total knee arthroplasty (Left, 04/23/2013); Tonsillectomy; Cardiac surgery; Coronary angioplasty with stent; other surgical history (08/25/2009); pr colon ca scrn not hi rsk ind (N/A, 2/27/2017); and Total knee arthroplasty (Right, 02/06/2018). Home Medications:  Prior to Admission medications    Medication Sig Start Date End Date Taking?  Authorizing Provider   fexofenadine (ALLEGRA) 180 MG tablet Take 180 mg by mouth as needed   Yes Historical Provider, MD   meloxicam (MOBIC) 15 MG tablet Take 15 mg by mouth daily   Yes Historical Provider, MD   pantoprazole (PROTONIX) 40 MG tablet take 1 tablet by mouth once daily 6/3/22  Yes Edgardo Olson MD   dicyclomine (BENTYL) 10 MG capsule take 1 capsule by mouth four times a day if needed for BOWEL CRAMPING OR BLOATING 5/20/22  Yes Edgardo Olson MD   atorvastatin (LIPITOR) 40 MG tablet take 1 tablet by mouth once daily 5/10/22  Yes Edgardo Olson MD   ondansetron (ZOFRAN) 4 MG tablet take 1 tablet by mouth every 8 hours if needed for nausea and vomiting 3/22/22  Yes PAYTON Rangel CNP   clopidogrel (PLAVIX) 75 MG tablet take 1 tablet by mouth daily 1/17/22  Yes Tobias Ware DO   diclofenac sodium (VOLTAREN) 1 % GEL Apply 4 g topically 4 times daily as needed for Pain 11/22/21  Yes Ilia Black DO   fexofenadine (ALLEGRA) 180 MG tablet Take 1 tablet by mouth daily Take 180 mg by mouth daily 11/19/21  Yes Edgardo Olson MD   levalbuterol [facility]) 45 MCG/ACT inhaler Inhale 1 puff into the lungs every 6 hours as needed for Wheezing or Shortness of Breath 11/17/21 11/17/22 Yes Aure Means MD   metoprolol tartrate (LOPRESSOR) 50 MG tablet take 1 tablet by mouth twice a day 9/13/21  Yes Edgardo Olson MD   HYDROcodone-acetaminophen (NORCO)  MG per tablet Take 1 tablet by mouth every 6 hours as needed for Pain. Yes Historical Provider, MD   triamcinolone (KENALOG) 0.1 % cream Apply topically 2 times daily. 5/15/20  Yes Edgardo Olson MD   cyclobenzaprine (FLEXERIL) 10 MG tablet Take 10 mg by mouth 3 times daily as needed for Muscle spasms    Yes Historical Provider, MD   aspirin 81 MG tablet Take 81 mg by mouth daily   Yes Historical Provider, MD   predniSONE (DELTASONE) 20 MG tablet Take 1 tablet by mouth in the morning and 1 tablet before bedtime. Do all this for 5 days.   Patient not taking: Reported on 8/18/2022 8/13/22 8/18/22  PAYTON Rangel CNP   nitroGLYCERIN (NITROSTAT) 0.4 MG SL tablet Place 1 tablet under the tongue every 5 minutes as needed for Chest pain  Patient not taking: Reported on 8/18/2022 5/15/20   Stormy Judd MD       Allergies:  Zonisamide    Social History:   reports that he quit smoking about 35 years ago. His smoking use included cigarettes. He has a 10.00 pack-year smoking history. He has never used smokeless tobacco. He reports that he does not drink alcohol and does not use drugs. Review of Systems:  Constitutional: there has been no decline in functional capacity  Eyes: No visual changes or diplopia. No scleral icterus. ENT: No Headaches, hearing loss or vertigo. No mouth sores or sore throat. Cardiovascular: As above. Respiratory: No SOB, cough or hemoptysis. Gastrointestinal: No abdominal pain, appetite loss, blood in stools. No change in bowel or bladder habits. Genitourinary: No dysuria, trouble voiding, or hematuria. Musculoskeletal: Positive for osteoarthritis in the back and knees and hips  Integumentary: No rash or pruritis. Psychiatric: No anxiety, or depression. Hematologic/Lymphatic: No abnormal bruising or bleeding, blood clots or swollen lymph nodes. Allergic/Immunologic: No nasal congestion or hives. Physical Exam:  BP (!) 152/70 (Site: Right Upper Arm, Position: Sitting, Cuff Size: Medium Adult)   Pulse 69   Resp 16   Wt 140 lb (63.5 kg)   SpO2 98%   BMI 22.60 kg/m²     Constitutional and General Appearance: alert, cooperative, no distress and appears stated age  HEENT: PERRL, no cervical lymphadenopathy. No masses palpable. Normal oral mucosa  Respiratory:  Normal excursion and expansion without use of accessory muscles  Resp Auscultation: Good respiratory effort. No for increased work of breathing. On auscultation: clear to auscultation bilaterally  Cardiovascular:   The apical impulse is not displaced  Heart tones are crisp and normal. regular S1 and S2.  Grade 2 x 6 ejection systolic murmur in the aortic area  Jugular venous pulsation Normal  The carotid upstroke is normal in amplitude and contour without delay or bruit  Peripheral pulses are symmetrical and full   Abdomen:   No masses or tenderness  Bowel sounds present  Extremities:   No Cyanosis or Clubbing   Lower extremity edema: No   Skin: Warm and dry    Cardiac Data:  EKG 8/18/2022: NSR, nonspecific t wave changes (limb lead reversal)     TTE 11/18/15  1. All cardiac chambers are normal in dimension. 2. Left ventricular systolic function is normal. Ejection  fraction is 60%. 3. Basal inferior wall is hypokinetic. 4. Grade II diastolic dysfunction. 5. Normal right ventricular size and function. 6. Mild mitral annular calcification. Mild mitral regurgitation. 7. Mildly sclerotic aortic valve with mild regurgitation. 8. Mild tricuspid regurgitation. RVSP is 39 mm Hg. 9. No pericardial effusion. Last Cath: 7/7/2015 Has 2 RCA and 1 Circ stent. All were Resolute Integrity stents     Lexiscan stress test 9/21/2020  IMPRESSION:  1. Gut artifact inferiorly. No obvious ischemia or infarction. 2. Normal left ventricular ejection fraction 64%, normal wall motion. Labs:     CBC: No results for input(s): WBC, HGB, HCT, PLT in the last 72 hours. BMP: No results for input(s): NA, K, CO2, BUN, CREATININE, LABGLOM, GLUCOSE in the last 72 hours. PT/INR: No results for input(s): PROTIME, INR in the last 72 hours. FASTING LIPID PANEL:  Lab Results   Component Value Date/Time    CHOL 132 07/07/2021 09:59 AM    HDL 37 07/07/2021 09:59 AM    LDLCHOLESTEROL 71 07/07/2021 09:59 AM    TRIG 122 07/07/2021 09:59 AM    CHOLHDLRATIO 3.6 07/07/2021 09:59 AM     IMPRESSION:    CAD with hx of PCI to RCA/LCx in 2015. Lexiscan stress test in 09/2020 negative for ischemia. Normal LVEF.    Hypertension  Hyperlipidemia   Diabetes mellitus   Osteoarthritis involving multiple joints      Patient Active Problem List   Diagnosis    Hypertension    Pro[address] cancer [address] [address]    Hyperlipidemia    Troponin level elevated    Enteritis    CAD (coronary artery disease) SP drug eluting stent 2 stents to RCA and 1 to L circ     NSTEMI (non-ST elevated myocardial infarction) ([address]    Type 2 diabetes mellitus without complication (HCC)    History of colon polyps    Chronic midline low back pain with bilateral sciatica    Bronchiectasis with acute lower respiratory infection (HCC)       RECOMMENDATIONS:    Continue medical therapy with Plavix, high intensity statin and lopressor  D/c ASA due to concomitant use of NSAIDS  Patient advised to monitor BP at home, if running persistently elevated will start norvasc   He will be low to moderate risk for urinary sphincter replacement   Ok to hold plavix x 5 days and resume post op when ok with urology   Risk factor modifications discussed with the patient   Educated regarding symptoms of ACS or CHF  Routine follow-up in 6 months or earlier if needed      Leonel Arauz MD, [facility]           [phone]

## 2022-08-18 NOTE — PROGRESS NOTES
P/W/D, easy RR, NAD Noted. Pt denies CP/SOB/Blurred vision, etc r/t HTN. Pt advises \"I took my meds late today, around noon. \"

## 2022-08-25 RX ORDER — CELECOXIB 200 MG/1
200 CAPSULE ORAL DAILY
COMMUNITY

## 2022-08-29 DIAGNOSIS — R11.0 NAUSEA: ICD-10-CM

## 2022-08-29 NOTE — TELEPHONE ENCOUNTER
Cheryl Schafer called requesting a refill of the below medication which has been pended for you:     Requested Prescriptions     Pending Prescriptions Disp Refills    dicyclomine (BENTYL) 10 MG capsule 30 capsule 2     Sig: take 1 capsule by mouth four times a day if needed for BOWEL CRAMPING OR BLOATING       Last Appointment Date: 5/20/2022  Next Appointment Date: 11/21/2022    Allergies   Allergen Reactions    Zonisamide Other (See Comments)     ANXIETY

## 2022-08-30 RX ORDER — DICYCLOMINE HYDROCHLORIDE 10 MG/1
CAPSULE ORAL
Qty: 30 CAPSULE | Refills: 2 | Status: SHIPPED | OUTPATIENT
Start: 2022-08-30

## 2022-10-31 DIAGNOSIS — I10 ESSENTIAL HYPERTENSION: ICD-10-CM

## 2022-10-31 RX ORDER — METOPROLOL TARTRATE 50 MG/1
TABLET, FILM COATED ORAL
Qty: 180 TABLET | Refills: 3 | Status: SHIPPED | OUTPATIENT
Start: 2022-10-31

## 2022-10-31 NOTE — TELEPHONE ENCOUNTER
Pharmacy requesting a refill of the below medication which has been pended for you:     Requested Prescriptions     Pending Prescriptions Disp Refills    metoprolol tartrate (LOPRESSOR) 50 MG tablet [Pharmacy Med Name: METOPROLOL TARTRATE 50 MG TAB] 180 tablet 3     Sig: take 1 tablet by mouth twice a day       Last Appointment Date: 5/20/2022  Next Appointment Date: 11/11/2022    Allergies   Allergen Reactions    Zonisamide Other (See Comments)     ANXIETY

## 2022-11-16 ENCOUNTER — HOSPITAL ENCOUNTER (OUTPATIENT)
Dept: GENERAL RADIOLOGY | Age: 77
Discharge: HOME | End: 2022-11-18
Payer: MEDICARE

## 2022-11-16 ENCOUNTER — OFFICE VISIT (OUTPATIENT)
Dept: PULMONOLOGY | Age: 77
End: 2022-11-16
Payer: MEDICARE

## 2022-11-16 VITALS
TEMPERATURE: 97 F | HEIGHT: 66 IN | OXYGEN SATURATION: 97 % | BODY MASS INDEX: 22.82 KG/M2 | HEART RATE: 57 BPM | DIASTOLIC BLOOD PRESSURE: 72 MMHG | WEIGHT: 142 LBS | SYSTOLIC BLOOD PRESSURE: 134 MMHG

## 2022-11-16 DIAGNOSIS — J47.0 BRONCHIECTASIS WITH ACUTE LOWER RESPIRATORY INFECTION (HCC): Primary | ICD-10-CM

## 2022-11-16 DIAGNOSIS — J47.0 BRONCHIECTASIS WITH ACUTE LOWER RESPIRATORY INFECTION (HCC): ICD-10-CM

## 2022-11-16 PROCEDURE — G8484 FLU IMMUNIZE NO ADMIN: HCPCS | Performed by: INTERNAL MEDICINE

## 2022-11-16 PROCEDURE — 99213 OFFICE O/P EST LOW 20 MIN: CPT | Performed by: INTERNAL MEDICINE

## 2022-11-16 PROCEDURE — 1123F ACP DISCUSS/DSCN MKR DOCD: CPT | Performed by: INTERNAL MEDICINE

## 2022-11-16 PROCEDURE — 99214 OFFICE O/P EST MOD 30 MIN: CPT | Performed by: INTERNAL MEDICINE

## 2022-11-16 PROCEDURE — 1036F TOBACCO NON-USER: CPT | Performed by: INTERNAL MEDICINE

## 2022-11-16 PROCEDURE — G8420 CALC BMI NORM PARAMETERS: HCPCS | Performed by: INTERNAL MEDICINE

## 2022-11-16 PROCEDURE — 3078F DIAST BP <80 MM HG: CPT | Performed by: INTERNAL MEDICINE

## 2022-11-16 PROCEDURE — 71046 X-RAY EXAM CHEST 2 VIEWS: CPT

## 2022-11-16 PROCEDURE — 3074F SYST BP LT 130 MM HG: CPT | Performed by: INTERNAL MEDICINE

## 2022-11-16 PROCEDURE — G8427 DOCREV CUR MEDS BY ELIG CLIN: HCPCS | Performed by: INTERNAL MEDICINE

## 2022-11-16 RX ORDER — DOXYCYCLINE HYCLATE 100 MG/1
100 CAPSULE ORAL 2 TIMES DAILY
Qty: 20 CAPSULE | Refills: 0 | Status: SHIPPED | OUTPATIENT
Start: 2022-11-16 | End: 2022-11-26

## 2022-11-16 NOTE — PROGRESS NOTES
REASON FOR THE CONSULTATION:  Cough . Chief Complaint   Patient presents with    Annual Exam     Bronchiectasis      HISTORY OF PRESENT ILLNESS:    Ting Owens is a 68y.o. year old male is doing well   Since last year chest feels more congested . He coughs several table spoons in a day   Uses his inhaler every night   No hemoptysis   Colour is yellow green   Weight loss - 10 lbs in last year   No fever   No antibiotics in last year         Last visit   Patient walks quite ways , lives in old house and can climb 20 stairs to his bedroom but gets winded after climbing stairs . He has cough with no hemoptysis , has grey , yellow colour , quarter of a cup in 1 day . He has albuterol mdi which he uses prn     When he was 6 years hold had severe pna and was hospitalized for 6 weeks . He had chills and fever for years and was seen at [facility] - was told has scar tissue in lung . By late teens chills and fever dec . Immunization   Immunization History   Administered Date(s) Administered    COVID-19, PFIZER PURPLE top, DILUTE for use, (age 15 y+), 30mcg/0.3mL 04/02/2021, 04/23/2021, 12/31/2021    Influenza, FLUAD, (age 72 y+), Adjuvanted, 0.5mL 11/19/2021    Influenza, High Dose (Fluzone 65 yrs and older) 12/23/2015, 11/04/2016, 11/08/2017    Influenza, Triv, inactivated, subunit, adjuvanted, IM (Fluad 65 yrs and older) 09/12/2019    Pneumococcal Conjugate 13-valent (Lorna Cure) 12/23/2015, 05/05/2017    Pneumococcal Polysaccharide ([id number]) 05/10/2019    Tdap (Boostrix, Adacel) 11/25/2019          PAST MEDICAL HISTORY:       Diagnosis Date    Chronic bronchitis (HCC)     DJD (degenerative joint disease)     GERD (gastroesophageal reflux disease)     Hearing loss     Mild    Hyperlipidemia     Hypertension     Osteoarthritis of left knee     Overactive bladder     Prostate cancer ([facility] [address]     Status post radical prostatectomy in 2009. Continues to follow with Dr. Karissa Bliss every 6 months.     Seasonal allergies     Spondylosis     Type II or unspecified type diabetes mellitus without mention of complication, not stated as uncontrolled     Diet controlled         Family History:       Problem Relation Age of Onset    Stroke Father        SURGICAL HISTORY:   Past Surgical History:   Procedure Laterality Date    CARDIAC SURGERY      COLONOSCOPY  03/15/2002    Dr. Jacinda Loyd      july 2015 drug-eluting stents placed 2 to the RCA and one to the left circumflex    OTHER SURGICAL HISTORY  08/25/2009    bilateral L3-4, L4-5, L5-S1 steroid facet injection     HI COLON CA SCRN NOT HI RSK IND N/A 2/27/2017    COLONOSCOPY performed by Jenifer Tillman MD at [facility] OR  diverticulosis    PROSTATECTOMY  02/09/2010    Dr. Gabbi Perez Left 04/23/2013    TOTAL KNEE ARTHROPLASTY Right 02/06/2018    Dr. Britney Ortiz/ [address], [facility], [facility] ENDOSCOPY  03/15/2002    Dr. Hernandez Cam:      There  No history of TB or TB exposure. There  No asbestos ,[place] dust exposure. The patient reports  No coal mine, [place], [facility] exposure. History of travel outside the country No  There  is use of   marijuana No   VapingNo  IV heroinNo  Crack cocaineNo  There  Nohot tub exposure. Pets -cats No, dogsYes  Birds No    Occupational history - tool and      TOBACCO:   reports that he quit smoking about 35 years ago. His smoking use included cigarettes. He has a 10.00 pack-year smoking history. He has never used smokeless tobacco.  ETOH:   reports no history of alcohol use. ALLERGIES:      Allergies   Allergen Reactions    Zonisamide Other (See Comments)     ANXIETY         Home Meds:   Prior to Admission medications    Medication Sig Start Date End Date Taking?  Authorizing Provider   doxycycline hyclate (VIBRAMYCIN) 100 MG capsule Take 1 capsule by mouth 2 times daily for 10 days 11/16/22 11/26/22 Yes Shona Vargas MD   metoprolol tartrate (LOPRESSOR) 50 MG tablet take 1 tablet by mouth twice a day 10/31/22  Yes Jannie Cates MD   dicyclomine (BENTYL) 10 MG capsule take 1 capsule by mouth four times a day if needed for BOWEL CRAMPING OR BLOATING 8/30/22  Yes Jannie Cates MD   celecoxib (CELEBREX) 200 MG capsule Take 200 mg by mouth daily   Yes Historical Provider, MD   fexofenadine (ALLEGRA) 180 MG tablet Take 180 mg by mouth as needed   Yes Historical Provider, MD   cyclobenzaprine (FLEXERIL) 10 MG tablet Take 1 tablet by mouth 3 times daily as needed for Muscle spasms 8/18/22  Yes Jannie Cates MD   levalbuterol [employer] HFA) 45 MCG/ACT inhaler Inhale 1 puff into the lungs every 6 hours as needed for Wheezing or Shortness of Breath 8/18/22 8/18/23 Yes Shona Vargas MD   pantoprazole (PROTONIX) 40 MG tablet take 1 tablet by mouth once daily 6/3/22  Yes Jannie Cates MD   atorvastatin (LIPITOR) 40 MG tablet take 1 tablet by mouth once daily 5/10/22  Yes Jannie Cates MD   clopidogrel (PLAVIX) 75 MG tablet take 1 tablet by mouth daily 1/17/22  Yes Tobias Ware DO   fexofenadine (ALLEGRA) 180 MG tablet Take 1 tablet by mouth daily Take 180 mg by mouth daily 11/19/21  Yes Jannie Cates MD   HYDROcodone-acetaminophen (NORCO)  MG per tablet Take 1 tablet by mouth every 6 hours as needed for Pain.    Yes Historical Provider, MD   meloxicam (MOBIC) 15 MG tablet Take 15 mg by mouth daily  Patient not taking: Reported on 11/16/2022    Historical Provider, MD   ondansetron (ZOFRAN) 4 MG tablet take 1 tablet by mouth every 8 hours if needed for nausea and vomiting  Patient not taking: Reported on 11/16/2022 3/22/22   Lauren Sanchez, APRN - CNP   diclofenac sodium (VOLTAREN) 1 % GEL Apply 4 g topically 4 times daily as needed for Pain  Patient not taking: Reported on 11/16/2022 11/22/21   Cisco Garcia, DO   triamcinolone (KENALOG) 0.1 % cream Apply topically 2 times daily. Patient not taking: Reported on 11/16/2022 5/15/20   Hue Doan MD   nitroGLYCERIN (NITROSTAT) 0.4 MG SL tablet Place 1 tablet under the tongue every 5 minutes as needed for Chest pain  Patient not taking: No sig reported 5/15/20   Hue Doan MD          Vitals:    11/16/22 1407   BP: 134/72   Pulse: 57   Temp: 97 °F (36.1 °C)   SpO2: 97%        Review of Systems -  General ROS: negative for - chills, fatigue, fever   ENT ROS: negative for - headaches, oral lesions or sore throat  Cardiovascular ROS: no chest pain , orthopnea or pnd   Gastrointestinal ROS: no abdominal pain, change in bowel habits, or black or bloody stools  Skin - no rash   Neuro - no blurry vision , no loc . No focal weakness   msk - no jt tenderness or swelling    Vascular - no claudication , rest completed and negative   Lymphatic - complete and negative   Hematology - oncology - complete and negative   Allergy immunology - complete and negative    no burning or hematuria      Exam   Head and neck atraumatic, normocephalic    Lymph nodes-no cervical, supraclavicular lymphadenopathy    Neck-no JVP elevation    Lungs - Ventilating all lobes  prolonged exp has rhonchi b/l   Rales rt base >left  No bronchial breath sounds. Chest expansion equal bilaterally    CVS- S1, S2 regular. No S3 no S4, no murmurs    Abdomen-nontender, nondistended. Bowel sounds are present. No organomegaly    Lower extremity-no edema    Upper extremity-no edema    Neurological-grossly normal cranial nerves. No overt motor deficit   Thyroid:   Lab Results   Component Value Date/Time    TSH 0.89 07/11/2015 07:30 PM         hrct   Impression    Right lower lobe bronchiectasis with peribronchial cuffing and opacification    distal bronchi as well as tree-in-bud nodules in the right lower lobe. Findings suggest infectious/inflammatory bronchopneumonia.   Aspiration is in    the differential.         No evidence of interstitial pulmonary fibrosis. patient has bronchiectasis right lower lobe greater than left  Basal . He has wheezing and purulent sputum . This was seen in ct abdomen and pelvis lung cuts of 2013 as well . 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 XR CHEST (2 VW)    Result Date: 11/16/2022  No acute process. IMPRESSION:     Diagnosis Orders   1. Bronchiectasis with acute lower respiratory infection ([address]  doxycycline hyclate (VIBRAMYCIN) 100 MG capsule    XR CHEST (2 VW)             :                PLAN:      I am concerned that patient has acute lower respiratory tract infection and exacerbation of his bronchiectasis. We will give him doxycycline for 10 days. Chest x-ray does not show consolidation. Continue secretion clearance with Acapella and Xopenex pretreatment before using Acapella at least twice a day. Follow-up in 1 month. Requested Prescriptions     Signed Prescriptions Disp Refills    doxycycline hyclate (VIBRAMYCIN) 100 MG capsule 20 capsule 0     Sig: Take 1 capsule by mouth 2 times daily for 10 days       There are no discontinued medications. Dirk Angle received counseling on the following healthy behaviors: nutrition, exercise and medication adherence    Patient given educational materials : see patient instruction       Discussed use, benefit, and side effects of prescribed medications. Barriers to medication compliance addressed. All patient questions answered. Pt voiced understanding. I hope this updates you on my evaluation and clinical thinking. Thank you for allowing me to participate in his care. Sincerely,      Electronically signed by Magy Patel MD on 11/22/2022 at 3:53 PM       Please note that this chart was generated using voice recognition Dragon dictation software. Although every effort was made to ensure the accuracy of this automated transcription, some errors in transcription may have occurred.

## 2022-12-06 ENCOUNTER — HOSPITAL ENCOUNTER (OUTPATIENT)
Age: 77
Discharge: HOME | End: 2022-12-06
Payer: MEDICARE

## 2022-12-06 DIAGNOSIS — C61 PROSTATIC ADENOCARCINOMA (HCC): ICD-10-CM

## 2022-12-06 DIAGNOSIS — I10 PRIMARY HYPERTENSION: ICD-10-CM

## 2022-12-06 DIAGNOSIS — E78.5 HYPERLIPIDEMIA, UNSPECIFIED HYPERLIPIDEMIA TYPE: ICD-10-CM

## 2022-12-06 DIAGNOSIS — E11.9 TYPE 2 DIABETES MELLITUS WITHOUT COMPLICATION, WITHOUT LONG-TERM CURRENT USE OF INSULIN (HCC): ICD-10-CM

## 2022-12-06 LAB
ALBUMIN SERPL-MCNC: 4 G/DL (ref 3.5–5.2)
ALBUMIN/GLOBULIN RATIO: 1.5 (ref 1–2.5)
ALP BLD-CCNC: 171 U/L (ref 40–129)
ALT SERPL-CCNC: 13 U/L (ref 5–41)
ANION GAP SERPL CALCULATED.3IONS-SCNC: 10 MMOL/L (ref 9–17)
AST SERPL-CCNC: 19 U/L
BILIRUB SERPL-MCNC: 0.8 MG/DL (ref 0.3–1.2)
BUN BLDV-MCNC: 19 MG/DL (ref 8–23)
BUN/CREAT BLD: 23 (ref 9–20)
CALCIUM SERPL-MCNC: 9.9 MG/DL (ref 8.6–10.4)
CHLORIDE BLD-SCNC: 102 MMOL/L (ref 98–107)
CHOLESTEROL/HDL RATIO: 3.3
CHOLESTEROL: 121 MG/DL
CO2: 27 MMOL/L (ref 20–31)
CREAT SERPL-MCNC: 0.82 MG/DL (ref 0.7–1.2)
GFR SERPL CREATININE-BSD FRML MDRD: >60 ML/MIN/1.73M2
GLUCOSE BLD-MCNC: 98 MG/DL (ref 70–99)
HDLC SERPL-MCNC: 37 MG/DL
LDL CHOLESTEROL: 63 MG/DL (ref 0–130)
POTASSIUM SERPL-SCNC: 4.5 MMOL/L (ref 3.7–5.3)
PROSTATE SPECIFIC ANTIGEN: <0.02 NG/ML
SODIUM BLD-SCNC: 139 MMOL/L (ref 135–144)
TOTAL PROTEIN: 6.7 G/DL (ref 6.4–8.3)
TRIGL SERPL-MCNC: 105 MG/DL

## 2022-12-06 PROCEDURE — 36415 COLL VENOUS BLD VENIPUNCTURE: CPT

## 2022-12-06 PROCEDURE — 80061 LIPID PANEL: CPT

## 2022-12-06 PROCEDURE — 84153 ASSAY OF PSA TOTAL: CPT

## 2022-12-06 PROCEDURE — 83036 HEMOGLOBIN GLYCOSYLATED A1C: CPT

## 2022-12-06 PROCEDURE — 80053 COMPREHEN METABOLIC PANEL: CPT

## 2022-12-07 LAB
ESTIMATED AVERAGE GLUCOSE: 117 MG/DL
HBA1C MFR BLD: 5.7 % (ref 4–6)

## 2022-12-13 ENCOUNTER — OFFICE VISIT (OUTPATIENT)
Dept: INTERNAL MEDICINE | Age: 77
End: 2022-12-13
Payer: MEDICARE

## 2022-12-13 ENCOUNTER — HOSPITAL ENCOUNTER (OUTPATIENT)
Age: 77
Setting detail: SPECIMEN
Discharge: HOME | End: 2022-12-13
Payer: MEDICARE

## 2022-12-13 VITALS
BODY MASS INDEX: 23.14 KG/M2 | SYSTOLIC BLOOD PRESSURE: 128 MMHG | OXYGEN SATURATION: 96 % | HEIGHT: 66 IN | DIASTOLIC BLOOD PRESSURE: 70 MMHG | HEART RATE: 62 BPM | RESPIRATION RATE: 16 BRPM | WEIGHT: 144 LBS

## 2022-12-13 DIAGNOSIS — Z00.00 MEDICARE ANNUAL WELLNESS VISIT, SUBSEQUENT: ICD-10-CM

## 2022-12-13 DIAGNOSIS — E11.9 TYPE 2 DIABETES MELLITUS WITHOUT COMPLICATION, WITHOUT LONG-TERM CURRENT USE OF INSULIN (HCC): ICD-10-CM

## 2022-12-13 DIAGNOSIS — R79.89 ELEVATED LFTS: ICD-10-CM

## 2022-12-13 DIAGNOSIS — E78.49 OTHER HYPERLIPIDEMIA: ICD-10-CM

## 2022-12-13 DIAGNOSIS — Z00.00 GENERAL MEDICAL EXAM: Primary | ICD-10-CM

## 2022-12-13 DIAGNOSIS — R11.0 NAUSEA: ICD-10-CM

## 2022-12-13 DIAGNOSIS — J42 CHRONIC BRONCHITIS, UNSPECIFIED CHRONIC BRONCHITIS TYPE (HCC): ICD-10-CM

## 2022-12-13 DIAGNOSIS — I10 PRIMARY HYPERTENSION: ICD-10-CM

## 2022-12-13 LAB
CREATININE URINE: 87.1 MG/DL (ref 39–259)
MICROALBUMIN/CREAT 24H UR: <12 MG/L
MICROALBUMIN/CREAT UR-RTO: NORMAL MCG/MG CREAT

## 2022-12-13 PROCEDURE — 1036F TOBACCO NON-USER: CPT | Performed by: INTERNAL MEDICINE

## 2022-12-13 PROCEDURE — 3074F SYST BP LT 130 MM HG: CPT | Performed by: INTERNAL MEDICINE

## 2022-12-13 PROCEDURE — 99214 OFFICE O/P EST MOD 30 MIN: CPT | Performed by: INTERNAL MEDICINE

## 2022-12-13 PROCEDURE — 82043 UR ALBUMIN QUANTITATIVE: CPT

## 2022-12-13 PROCEDURE — G8427 DOCREV CUR MEDS BY ELIG CLIN: HCPCS | Performed by: INTERNAL MEDICINE

## 2022-12-13 PROCEDURE — 3078F DIAST BP <80 MM HG: CPT | Performed by: INTERNAL MEDICINE

## 2022-12-13 PROCEDURE — 3023F SPIROM DOC REV: CPT | Performed by: INTERNAL MEDICINE

## 2022-12-13 PROCEDURE — 82570 ASSAY OF URINE CREATININE: CPT

## 2022-12-13 PROCEDURE — 3044F HG A1C LEVEL LT 7.0%: CPT | Performed by: INTERNAL MEDICINE

## 2022-12-13 PROCEDURE — 1123F ACP DISCUSS/DSCN MKR DOCD: CPT | Performed by: INTERNAL MEDICINE

## 2022-12-13 PROCEDURE — G8420 CALC BMI NORM PARAMETERS: HCPCS | Performed by: INTERNAL MEDICINE

## 2022-12-13 PROCEDURE — G8484 FLU IMMUNIZE NO ADMIN: HCPCS | Performed by: INTERNAL MEDICINE

## 2022-12-13 PROCEDURE — G0439 PPPS, SUBSEQ VISIT: HCPCS | Performed by: INTERNAL MEDICINE

## 2022-12-13 RX ORDER — DICYCLOMINE HYDROCHLORIDE 10 MG/1
CAPSULE ORAL
Qty: 30 CAPSULE | Refills: 2 | Status: SHIPPED | OUTPATIENT
Start: 2022-12-13

## 2022-12-13 ASSESSMENT — ENCOUNTER SYMPTOMS
BACK PAIN: 0
NAUSEA: 0
EYE PAIN: 0
COUGH: 0
BLOOD IN STOOL: 0
ABDOMINAL PAIN: 0
VOMITING: 0
CONSTIPATION: 0
SHORTNESS OF BREATH: 0
DIARRHEA: 0

## 2022-12-13 ASSESSMENT — PATIENT HEALTH QUESTIONNAIRE - PHQ9
SUM OF ALL RESPONSES TO PHQ9 QUESTIONS 1 & 2: 0
SUM OF ALL RESPONSES TO PHQ QUESTIONS 1-9: 0
1. LITTLE INTEREST OR PLEASURE IN DOING THINGS: 0
SUM OF ALL RESPONSES TO PHQ QUESTIONS 1-9: 0
2. FEELING DOWN, DEPRESSED OR HOPELESS: 0
SUM OF ALL RESPONSES TO PHQ QUESTIONS 1-9: 0
SUM OF ALL RESPONSES TO PHQ QUESTIONS 1-9: 0

## 2022-12-13 ASSESSMENT — LIFESTYLE VARIABLES
HOW MANY STANDARD DRINKS CONTAINING ALCOHOL DO YOU HAVE ON A TYPICAL DAY: PATIENT DOES NOT DRINK
HOW OFTEN DO YOU HAVE A DRINK CONTAINING ALCOHOL: NEVER

## 2022-12-13 NOTE — PATIENT INSTRUCTIONS
Personalized Preventive Plan for Nichol Ordonez - 12/13/2022  Medicare offers a range of preventive health benefits. Some of the tests and screenings are paid in full while other may be subject to a deductible, co-insurance, and/or copay. Some of these benefits include a comprehensive review of your medical history including lifestyle, illnesses that may run in your family, and various assessments and screenings as appropriate. After reviewing your medical record and screening and assessments performed today your provider may have ordered immunizations, labs, imaging, and/or referrals for you. A list of these orders (if applicable) as well as your Preventive Care list are included within your After Visit Summary for your review. Other Preventive Recommendations:    A preventive eye exam performed by an eye specialist is recommended every 1-2 years to screen for glaucoma; cataracts, macular degeneration, and other eye disorders. A preventive dental visit is recommended every 6 months. Try to get at least 150 minutes of exercise per week or 10,000 steps per day on a pedometer . Order or download the FREE \"Exercise & Physical Activity: Your Everyday Guide\" from [employer]. Call 5-196.530.3070 or search [employer] online. You need 8313-4036 mg of calcium and 9000-9315 IU of vitamin D per day. It is possible to meet your calcium requirement with diet alone, but a vitamin D supplement is usually necessary to meet this goal.  When exposed to the sun, use a sunscreen that protects against both UVA and UVB radiation with an SPF of 30 or greater. Reapply every 2 to 3 hours or after sweating, drying off with a towel, or swimming. Always wear a seat belt when traveling in a car. Always wear a helmet when riding a bicycle or motorcycle.

## 2022-12-13 NOTE — PROGRESS NOTES
Medicare Annual Wellness Visit    Preeti Calle is here for Medicare AWV (6 month), Hypertension, Hyperlipidemia, and Diabetes    Assessment & Plan   General medical exam  Nausea  -     dicyclomine (BENTYL) 10 MG capsule; take 1 capsule by mouth four times a day if needed for BOWEL CRAMPING OR BLOATING, Disp-30 capsule, R-2Normal  Medicare annual wellness visit, subsequent  Other hyperlipidemia  Type 2 diabetes mellitus without complication, without long-term current use of insulin (HCC)  -     Hemoglobin A1C; Future  Primary hypertension  Chronic bronchitis, unspecified chronic bronchitis type ([address]  Elevated LFTs  -     Hepatic Function Panel; Future    Recommendations for Preventive Services Due: see orders and patient instructions/AVS.  Recommended screening schedule for the next 5-10 years is provided to the patient in written form: see Patient Instructions/AVS.     Return in about 6 months (around 6/19/2023) for Hypertension, Hyperlipidemia, Diabetes. Subjective       Patient's complete Health Risk Assessment and screening values have been reviewed and are found in Flowsheets. The following problems were reviewed today and where indicated follow up appointments were made and/or referrals ordered. Positive Risk Factor Screenings with Interventions:                Opioid Risk: (Low risk score <55) Opioid risk score: 14    Patient is low risk for opioid use disorder or overdose. Last PDMP Luis Fernando Parents as Reviewed:  Review User Review Instant Review Result              Last Controlled Substance Monitoring Documentation      [address] [facility] ED from 3/1/2016 in [facility] Emergency Department   Comments Patient was given a prescription for tramadol by Dr. Emmy Olivarez. patient states he does have a pain contract at the arthritis center where he is treated for his back pain. Patient is not certain if he should take the prescription or not.   Dr. Андрей West advises patient to take the prescription, call his pain center and ask if it is ok.  If not he is not rip it up and throw it away. Patient verbalizes understanding. filed at 03/01/2016 1313                 Vision Screen:  Do you have difficulty driving, watching TV, or doing any of your daily activities because of your eyesight?: No  Have you had an eye exam within the past year?: (!) No  No results found. Interventions:   Patient declines any further evaluation or treatment      Advanced Directives:  Do you have a Living Will?: (!) No    Intervention:  . Patient declines living will at this time            Objective   Vitals:    12/13/22 1443   BP: 128/70   Site: Left Upper Arm   Position: Sitting   Cuff Size: Large Adult   Pulse: 62   Resp: 16   SpO2: 96%   Weight: 144 lb (65.3 kg)   Height: 5' 6\" (1.676 m)      Body mass index is 23.24 kg/m². Allergies   Allergen Reactions    Zonisamide Other (See Comments)     ANXIETY     Prior to Visit Medications    Medication Sig Taking?  Authorizing Provider   dicyclomine (BENTYL) 10 MG capsule take 1 capsule by mouth four times a day if needed for BOWEL CRAMPING OR BLOATING Yes Saadia Fnik MD   metoprolol tartrate (LOPRESSOR) 50 MG tablet take 1 tablet by mouth twice a day Yes Saadia Fink MD   celecoxib (CELEBREX) 200 MG capsule Take 200 mg by mouth daily Yes Historical Provider, MD   cyclobenzaprine (FLEXERIL) 10 MG tablet Take 1 tablet by mouth 3 times daily as needed for Muscle spasms Yes Saadia Fink MD   levalbuterol (XOPENEX HFA) 45 MCG/ACT inhaler Inhale 1 puff into the lungs every 6 hours as needed for Wheezing or Shortness of Breath Yes Ariana Harringtonoma, MD   pantoprazole (PROTONIX) 40 MG tablet take 1 tablet by mouth once daily Yes Saadia Fink MD   atorvastatin (LIPITOR) 40 MG tablet take 1 tablet by mouth once daily Yes Saadia Fink MD   ondansetron (ZOFRAN) 4 MG tablet take 1 tablet by mouth every 8 hours if needed for nausea and vomiting Yes Madeline Loredo, APRN - CNP   clopidogrel (PLAVIX) 75 MG tablet take 1 tablet by mouth daily Yes Tobias Ware DO   fexofenadine (ALLEGRA) 180 MG tablet Take 1 tablet by mouth daily Take 180 mg by mouth daily Yes Sammie Lopez MD   HYDROcodone-acetaminophen (NORCO)  MG per tablet Take 1 tablet by mouth every 6 hours as needed for Pain. Yes Historical Provider, MD   triamcinolone (KENALOG) 0.1 % cream Apply topically 2 times daily. Yes Sammie Lopez MD   meloxicam (MOBIC) 15 MG tablet Take 15 mg by mouth daily  Historical Provider, MD   diclofenac sodium (VOLTAREN) 1 % GEL Apply 4 g topically 4 times daily as needed for Pain  Patient not taking: Reported on 11/16/2022  Yakov Toledo DO   nitroGLYCERIN (NITROSTAT) 0.4 MG SL tablet Place 1 tablet under the tongue every 5 minutes as needed for Chest pain  Patient not taking: No sig reported  Sammie Lopez MD       CareTeam (Including outside providers/suppliers regularly involved in providing care):   Patient Care Team:  Sammie Lopez MD as PCP - General (Internal Medicine)  Sammie Lopez MD as PCP - [facility] Empaneled Provider  MD Laci España DO as Consulting Physician (Cardiology)     Reviewed and updated this visit:  Tobacco  Allergies  Meds  Problems  Med Hx  Surg Hx  Soc Hx  Fam Hx             I, Dr. Mikael Kwan, directly supervised the performance of this Medicare annual wellness visit.

## 2022-12-13 NOTE — PROGRESS NOTES
[place] [address]  Dept: [phone]  Dept Fax: [phone]  Loc: [phone]     Raj Burrell is a 68 y.o. male who presents today for his medical conditions/complaintsas noted below. Raj Burrell is c/o of   Chief Complaint   Patient presents with    Medicare AWV     6 month    Hypertension    Hyperlipidemia    Diabetes         HPI:     Hypertension  This is a chronic problem. The current episode started more than 1 year ago. The problem has been waxing and waning since onset. The problem is controlled. Pertinent negatives include no chest pain, headaches, neck pain, palpitations or shortness of breath. Hyperlipidemia  This is a chronic problem. The current episode started more than 1 year ago. The problem is controlled. Recent lipid tests were reviewed and are variable. Pertinent negatives include no chest pain or shortness of breath. Diabetes  He presents for his follow-up diabetic visit. He has type 2 diabetes mellitus. The initial diagnosis of diabetes was made 11 years ago. His disease course has been fluctuating. Pertinent negatives for hypoglycemia include no confusion, dizziness, headaches, nervousness/anxiousness or pallor. Pertinent negatives for diabetes include no chest pain, no polydipsia, no polyuria and no weakness. Other  This is a recurrent (4-general medical exam,  5-chronic bronchitis) problem. The current episode started today. The problem occurs intermittently. The problem has been waxing and waning. Pertinent negatives include no abdominal pain, arthralgias, chest pain, chills, coughing, fever, headaches, nausea, neck pain, numbness, rash, vomiting or weakness. Hemoglobin A1C (%)   Date Value   12/06/2022 5.7   05/20/2022 5.7   11/19/2021 5.9            Microalb/Crt.  Ratio (mcg/mg creat)   Date Value   11/19/2021 Can not be calculated     LDL Cholesterol (mg/dL)   Date Value   2022 63   2021 71   2019 56         AST (U/L)   Date Value   2022 19     ALT (U/L)   Date Value   2022 13     BUN (mg/dL)   Date Value   2022 19     BP Readings from Last 3 Encounters:   22 128/70   22 134/72   22 (!) 152/70              Past Medical History:   Diagnosis Date    Chronic bronchitis (HCC)     DJD (degenerative joint disease)     GERD (gastroesophageal reflux disease)     Hearing loss     Mild    Hyperlipidemia     Hypertension     Osteoarthritis of left knee     Overactive bladder     Prostate cancer ([facility] [address]     Status post radical prostatectomy in . Continues to follow with Dr. Cynthia Laguna every 6 months. Seasonal allergies     Spondylosis     Type II or unspecified type diabetes mellitus without mention of complication, not stated as uncontrolled     Diet controlled      Past Surgical History:   Procedure Laterality Date    CARDIAC SURGERY      COLONOSCOPY  03/15/2002    Dr. Simone Dillon      2015 drug-eluting stents placed 2 to the RCA and one to the left circumflex    OTHER SURGICAL HISTORY  2009    bilateral L3-4, L4-5, L5-S1 steroid facet injection     WI COLON CA SCRN NOT HI RSK IND N/A 2017    COLONOSCOPY performed by Chance Leos MD at [facility] OR  diverticulosis    PROSTATECTOMY  2010    Dr. Deepak Shelton Left 2013    TOTAL KNEE ARTHROPLASTY Right 2018    Dr. Nina Ortiz/ Kody Apodaca, [facility], [place]    UPPER GASTROINTESTINAL ENDOSCOPY  03/15/2002    Dr. Brianne Dubon       Family History   Problem Relation Age of Onset    Stroke Father           Social History     Tobacco Use    Smoking status: Former     Packs/day: 0.50     Years: 20.00     Pack years: 10.00     Types: Cigarettes     Quit date: 1987     Years since quittin.6    Smokeless tobacco: Never    Tobacco comments:     Quit 30 years ago.  Tatiana Segura RCP   Substance Use Topics    Alcohol use: No     Alcohol/week: 0.0 standard drinks         Current Outpatient Medications   Medication Sig Dispense Refill    dicyclomine (BENTYL) 10 MG capsule take 1 capsule by mouth four times a day if needed for BOWEL CRAMPING OR BLOATING 30 capsule 2    metoprolol tartrate (LOPRESSOR) 50 MG tablet take 1 tablet by mouth twice a day 180 tablet 3    celecoxib (CELEBREX) 200 MG capsule Take 200 mg by mouth daily      cyclobenzaprine (FLEXERIL) 10 MG tablet Take 1 tablet by mouth 3 times daily as needed for Muscle spasms 21 tablet 0    levalbuterol (XOPENEX HFA) 45 MCG/ACT inhaler Inhale 1 puff into the lungs every 6 hours as needed for Wheezing or Shortness of Breath 1 each 5    pantoprazole (PROTONIX) 40 MG tablet take 1 tablet by mouth once daily 90 tablet 3    atorvastatin (LIPITOR) 40 MG tablet take 1 tablet by mouth once daily 90 tablet 3    ondansetron (ZOFRAN) 4 MG tablet take 1 tablet by mouth every 8 hours if needed for nausea and vomiting 30 tablet 0    clopidogrel (PLAVIX) 75 MG tablet take 1 tablet by mouth daily 90 tablet 3    fexofenadine (ALLEGRA) 180 MG tablet Take 1 tablet by mouth daily Take 180 mg by mouth daily 90 tablet 3    HYDROcodone-acetaminophen (NORCO)  MG per tablet Take 1 tablet by mouth every 6 hours as needed for Pain.      triamcinolone (KENALOG) 0.1 % cream Apply topically 2 times daily. 45 g 3    meloxicam (MOBIC) 15 MG tablet Take 15 mg by mouth daily      diclofenac sodium (VOLTAREN) 1 % GEL Apply 4 g topically 4 times daily as needed for Pain (Patient not taking: Reported on 11/16/2022) 150 g 2    nitroGLYCERIN (NITROSTAT) 0.4 MG SL tablet Place 1 tablet under the tongue every 5 minutes as needed for Chest pain (Patient not taking: No sig reported) 25 tablet 3     No current facility-administered medications for this visit.      Allergies   Allergen Reactions    Zonisamide Other (See Comments)     ANXIETY       Health Maintenance   Topic Date Due    Shingles vaccine (1 of 2) Never done    COVID-19 Vaccine (4 - Booster for Pfizer series) 02/25/2022    Flu vaccine (1) 08/01/2022    Depression Screen  08/18/2023    Lipids  12/06/2023    Prostate Specific Antigen (PSA) Screening or Monitoring  12/06/2023    Annual Wellness Visit (AWV)  12/14/2023    DTaP/Tdap/Td vaccine (2 - Td or Tdap) 11/25/2029    Pneumococcal 65+ years Vaccine  Completed    Hepatitis C screen  Completed    Hepatitis A vaccine  Aged Out    Hib vaccine  Aged Out    Meningococcal (ACWY) vaccine  Aged Out       Subjective:      Review of Systems   Constitutional:  Negative for chills and fever. HENT:  Negative for hearing loss. Eyes:  Negative for pain and visual disturbance. Respiratory:  Negative for cough and shortness of breath. Cardiovascular:  Negative for chest pain, palpitations and leg swelling. Gastrointestinal:  Negative for abdominal pain, blood in stool, constipation, diarrhea, nausea and vomiting. Endocrine: Negative for cold intolerance, polydipsia and polyuria. Genitourinary:  Negative for difficulty urinating, dysuria and hematuria. Musculoskeletal:  Negative for arthralgias, back pain, gait problem and neck pain. Skin:  Negative for pallor and rash. Neurological:  Negative for dizziness, weakness, numbness and headaches. Hematological:  Negative for adenopathy. Does not bruise/bleed easily. Psychiatric/Behavioral:  Negative for confusion. The patient is not nervous/anxious. Objective:     Physical Exam  Vitals reviewed. Constitutional:       Appearance: He is well-developed. HENT:      Head: Normocephalic and atraumatic. Eyes:      Pupils: Pupils are equal, round, and reactive to light. Cardiovascular:      Rate and Rhythm: Normal rate and regular rhythm. Heart sounds: No murmur heard. No friction rub. No gallop. Pulmonary:      Effort: Pulmonary effort is normal.      Breath sounds: Normal breath sounds.  No wheezing or rales.   Abdominal:      General: There is no distension. Palpations: Abdomen is soft. There is no mass. Tenderness: There is no abdominal tenderness. There is no rebound. Musculoskeletal:         General: Normal range of motion. Cervical back: Neck supple. Lymphadenopathy:      Cervical: No cervical adenopathy. Skin:     General: Skin is warm and dry. Findings: No rash. Neurological:      Mental Status: He is alert and oriented to person, place, and time. Cranial Nerves: No cranial nerve deficit (grossly). Psychiatric:         Thought Content: Thought content normal.      /70 (Site: Left Upper Arm, Position: Sitting, Cuff Size: Large Adult)   Pulse 62   Resp 16   Ht 5' 6\" (1.676 m)   Wt 144 lb (65.3 kg)   SpO2 96%   BMI 23.24 kg/m²     Assessment:       Diagnosis Orders   1. General medical exam        2. Nausea  dicyclomine (BENTYL) 10 MG capsule      3. Medicare annual wellness visit, subsequent        4. Other hyperlipidemia        5. Type 2 diabetes mellitus without complication, without long-term current use of insulin ([facility])  Hemoglobin A1C      6. Primary hypertension        7. Chronic bronchitis, unspecified chronic bronchitis type ([facility] [address]        8. Elevated LFTs  Hepatic Function Panel      Reviewed multiple test results this appointment. 12/6/2022 normal creatinine 0.82    12/6/2022 normal total cholesterol 141    12/6/2022 normal diagnostic PSA less than 0.02    12/6/2022 normal A1c 5.7    Patient told to continue Lipitor and Plavix. Plan:       Return in about 6 months (around 6/19/2023) for Hypertension, Hyperlipidemia, Diabetes.     Orders Placed This Encounter   Procedures    Hepatic Function Panel     Standing Status:   Future     Standing Expiration Date:   12/13/2023    Hemoglobin A1C     Standing Status:   Future     Standing Expiration Date:   12/13/2023       Orders Placed This Encounter   Medications    dicyclomine (BENTYL) 10 MG capsule Sig: take 1 capsule by mouth four times a day if needed for BOWEL CRAMPING OR BLOATING     Dispense:  30 capsule     Refill:  2         Patientgiven educational materials - see patient instructions. Discussed use, benefit,and side effects of prescribed medications. All patient questions answered. Ptvoiced understanding. Reviewed health maintenance. Instructed to continue currentmedications, diet and exercise. Patient agreed with treatment plan. Follow up asdirected.      Electronically signed by Hue Doan MD on 12/13/2022 at 3:12 PM

## 2022-12-24 DIAGNOSIS — Z91.09 ENVIRONMENTAL ALLERGIES: ICD-10-CM

## 2022-12-27 RX ORDER — FEXOFENADINE HCL 180 MG/1
TABLET ORAL
Qty: 90 TABLET | Refills: 3 | Status: SHIPPED | OUTPATIENT
Start: 2022-12-27

## 2022-12-27 NOTE — TELEPHONE ENCOUNTER
David Arevalo called requesting a refill of the below medication which has been pended for you:     Requested Prescriptions     Pending Prescriptions Disp Refills    fexofenadine (ALLEGRA) 180 MG tablet [Pharmacy Med Name: FEXOFENADINE  MG TABLET] 90 tablet 3     Sig: take 1 tablet by mouth once daily       Last Appointment Date: 12/13/2022  Next Appointment Date: 6/13/2023    Allergies   Allergen Reactions    Zonisamide Other (See Comments)     ANXIETY

## 2023-02-13 ENCOUNTER — HOSPITAL ENCOUNTER (EMERGENCY)
Age: 78
Discharge: HOME | End: 2023-02-13
Attending: EMERGENCY MEDICINE
Payer: MEDICARE

## 2023-02-13 ENCOUNTER — APPOINTMENT (OUTPATIENT)
Dept: CT IMAGING | Age: 78
End: 2023-02-13
Payer: MEDICARE

## 2023-02-13 ENCOUNTER — OFFICE VISIT (OUTPATIENT)
Dept: PRIMARY CARE CLINIC | Age: 78
End: 2023-02-13
Payer: MEDICARE

## 2023-02-13 VITALS
SYSTOLIC BLOOD PRESSURE: 140 MMHG | OXYGEN SATURATION: 96 % | TEMPERATURE: 98.3 F | WEIGHT: 145 LBS | HEART RATE: 66 BPM | RESPIRATION RATE: 20 BRPM | DIASTOLIC BLOOD PRESSURE: 70 MMHG | HEIGHT: 66 IN | BODY MASS INDEX: 23.3 KG/M2

## 2023-02-13 VITALS
HEART RATE: 56 BPM | WEIGHT: 145 LBS | SYSTOLIC BLOOD PRESSURE: 183 MMHG | RESPIRATION RATE: 17 BRPM | BODY MASS INDEX: 23.3 KG/M2 | OXYGEN SATURATION: 98 % | TEMPERATURE: 98.1 F | DIASTOLIC BLOOD PRESSURE: 73 MMHG | HEIGHT: 66 IN

## 2023-02-13 DIAGNOSIS — R19.7 DIARRHEA, UNSPECIFIED TYPE: ICD-10-CM

## 2023-02-13 DIAGNOSIS — R10.84 GENERALIZED ABDOMINAL PAIN: Primary | ICD-10-CM

## 2023-02-13 DIAGNOSIS — R10.11 RUQ PAIN: Primary | ICD-10-CM

## 2023-02-13 DIAGNOSIS — S22.080A COMPRESSION FRACTURE OF T12 VERTEBRA, INITIAL ENCOUNTER (HCC): ICD-10-CM

## 2023-02-13 DIAGNOSIS — R11.2 NAUSEA AND VOMITING, UNSPECIFIED VOMITING TYPE: ICD-10-CM

## 2023-02-13 LAB
ABSOLUTE EOS #: 0.43 K/UL (ref 0–0.44)
ABSOLUTE IMMATURE GRANULOCYTE: <0.03 K/UL (ref 0–0.3)
ABSOLUTE LYMPH #: 1.07 K/UL (ref 1.1–3.7)
ABSOLUTE MONO #: 0.51 K/UL (ref 0.1–1.2)
ALBUMIN SERPL-MCNC: 4.2 G/DL (ref 3.5–5.2)
ALBUMIN/GLOBULIN RATIO: 1.8 (ref 1–2.5)
ALP SERPL-CCNC: 137 U/L (ref 40–129)
ALT SERPL-CCNC: 29 U/L (ref 5–41)
AMYLASE SERPL-CCNC: 89 U/L (ref 28–100)
ANION GAP SERPL CALCULATED.3IONS-SCNC: 11 MMOL/L (ref 9–17)
AST SERPL-CCNC: 28 U/L
BASOPHILS # BLD: 1 % (ref 0–2)
BASOPHILS ABSOLUTE: 0.03 K/UL (ref 0–0.2)
BILIRUB DIRECT SERPL-MCNC: <0.1 MG/DL
BILIRUB INDIRECT SERPL-MCNC: ABNORMAL MG/DL (ref 0–1)
BILIRUB SERPL-MCNC: 0.4 MG/DL (ref 0.3–1.2)
BILIRUBIN URINE: NEGATIVE
BUN SERPL-MCNC: 21 MG/DL (ref 8–23)
BUN/CREAT BLD: 24 (ref 9–20)
CALCIUM SERPL-MCNC: 9.1 MG/DL (ref 8.6–10.4)
CHLORIDE SERPL-SCNC: 102 MMOL/L (ref 98–107)
CO2 SERPL-SCNC: 29 MMOL/L (ref 20–31)
COLOR: YELLOW
COMMENT UA: ABNORMAL
CREAT SERPL-MCNC: 0.89 MG/DL (ref 0.7–1.2)
EOSINOPHILS RELATIVE PERCENT: 8 % (ref 1–4)
GFR SERPL CREATININE-BSD FRML MDRD: >60 ML/MIN/1.73M2
GLOBULIN SER-MCNC: 2.4 G/DL (ref 1.5–3.8)
GLUCOSE SERPL-MCNC: 106 MG/DL (ref 70–99)
GLUCOSE UR STRIP.AUTO-MCNC: NEGATIVE MG/DL
HCT VFR BLD AUTO: 41.1 % (ref 40.7–50.3)
HGB BLD-MCNC: 14 G/DL (ref 13–17)
IMMATURE GRANULOCYTES: 0 %
KETONES UR STRIP.AUTO-MCNC: NEGATIVE MG/DL
LACTATE PLASV-SCNC: 0.8 MMOL/L (ref 0.5–2.2)
LEUKOCYTE ESTERASE UR QL STRIP.AUTO: NEGATIVE
LIPASE SERPL-CCNC: 12 U/L (ref 13–60)
LYMPHOCYTES # BLD: 20 % (ref 24–43)
MCH RBC QN AUTO: 29 PG (ref 25.2–33.5)
MCHC RBC AUTO-ENTMCNC: 34.1 G/DL (ref 25.2–33.5)
MCV RBC AUTO: 85.3 FL (ref 82.6–102.9)
MONOCYTES # BLD: 10 % (ref 3–12)
NITRITE UR QL STRIP.AUTO: NEGATIVE
NRBC AUTOMATED: 0 PER 100 WBC
PDW BLD-RTO: 12.6 % (ref 11.8–14.4)
PLATELET # BLD AUTO: 229 K/UL (ref 138–453)
PMV BLD AUTO: 9 FL (ref 8.1–13.5)
POTASSIUM SERPL-SCNC: 4.4 MMOL/L (ref 3.7–5.3)
PROT SERPL-MCNC: 6.6 G/DL (ref 6.4–8.3)
PROT UR STRIP.AUTO-MCNC: 8.5 MG/DL (ref 5–6)
PROT UR STRIP.AUTO-MCNC: NEGATIVE MG/DL
RBC # BLD: 4.82 M/UL (ref 4.21–5.77)
SEG NEUTROPHILS: 61 % (ref 36–65)
SEGMENTED NEUTROPHILS ABSOLUTE COUNT: 3.32 K/UL (ref 1.5–8.1)
SODIUM SERPL-SCNC: 142 MMOL/L (ref 135–144)
SPECIFIC GRAVITY UA: 1.02 (ref 1.01–1.02)
TROPONIN I SERPL DL<=0.01 NG/ML-MCNC: 9 NG/L (ref 0–22)
TURBIDITY: CLEAR
URINE HGB: NEGATIVE
UROBILINOGEN, URINE: NORMAL
WBC # BLD AUTO: 5.4 K/UL (ref 3.5–11.3)

## 2023-02-13 PROCEDURE — 82150 ASSAY OF AMYLASE: CPT

## 2023-02-13 PROCEDURE — 74176 CT ABD & PELVIS W/O CONTRAST: CPT

## 2023-02-13 PROCEDURE — 2580000003 HC RX 258: Performed by: EMERGENCY MEDICINE

## 2023-02-13 PROCEDURE — 80048 BASIC METABOLIC PNL TOTAL CA: CPT

## 2023-02-13 PROCEDURE — 99284 EMERGENCY DEPT VISIT MOD MDM: CPT

## 2023-02-13 PROCEDURE — 99211 OFF/OP EST MAY X REQ PHY/QHP: CPT | Performed by: NURSE PRACTITIONER

## 2023-02-13 PROCEDURE — 96375 TX/PRO/DX INJ NEW DRUG ADDON: CPT

## 2023-02-13 PROCEDURE — 99999 PR OFFICE/OUTPT VISIT,PROCEDURE ONLY: CPT | Performed by: NURSE PRACTITIONER

## 2023-02-13 PROCEDURE — 81003 URINALYSIS AUTO W/O SCOPE: CPT

## 2023-02-13 PROCEDURE — 80076 HEPATIC FUNCTION PANEL: CPT

## 2023-02-13 PROCEDURE — 96374 THER/PROPH/DIAG INJ IV PUSH: CPT

## 2023-02-13 PROCEDURE — 6360000002 HC RX W HCPCS: Performed by: EMERGENCY MEDICINE

## 2023-02-13 PROCEDURE — 83605 ASSAY OF LACTIC ACID: CPT

## 2023-02-13 PROCEDURE — 36415 COLL VENOUS BLD VENIPUNCTURE: CPT

## 2023-02-13 PROCEDURE — 83690 ASSAY OF LIPASE: CPT

## 2023-02-13 PROCEDURE — 84484 ASSAY OF TROPONIN QUANT: CPT

## 2023-02-13 PROCEDURE — 85025 COMPLETE CBC W/AUTO DIFF WBC: CPT

## 2023-02-13 RX ORDER — ONDANSETRON 2 MG/ML
4 INJECTION INTRAMUSCULAR; INTRAVENOUS ONCE
Status: COMPLETED | OUTPATIENT
Start: 2023-02-13 | End: 2023-02-13

## 2023-02-13 RX ORDER — 0.9 % SODIUM CHLORIDE 0.9 %
500 INTRAVENOUS SOLUTION INTRAVENOUS ONCE
Status: COMPLETED | OUTPATIENT
Start: 2023-02-13 | End: 2023-02-13

## 2023-02-13 RX ORDER — MORPHINE SULFATE 4 MG/ML
4 INJECTION, SOLUTION INTRAMUSCULAR; INTRAVENOUS ONCE
Status: COMPLETED | OUTPATIENT
Start: 2023-02-13 | End: 2023-02-13

## 2023-02-13 RX ADMIN — MORPHINE SULFATE 4 MG: 4 INJECTION, SOLUTION INTRAMUSCULAR; INTRAVENOUS at 18:23

## 2023-02-13 RX ADMIN — SODIUM CHLORIDE 500 ML: 9 INJECTION, SOLUTION INTRAVENOUS at 18:26

## 2023-02-13 RX ADMIN — ONDANSETRON 4 MG: 2 INJECTION INTRAMUSCULAR; INTRAVENOUS at 18:23

## 2023-02-13 SDOH — ECONOMIC STABILITY: FOOD INSECURITY: WITHIN THE PAST 12 MONTHS, YOU WORRIED THAT YOUR FOOD WOULD RUN OUT BEFORE YOU GOT MONEY TO BUY MORE.: NEVER TRUE

## 2023-02-13 SDOH — ECONOMIC STABILITY: FOOD INSECURITY: WITHIN THE PAST 12 MONTHS, THE FOOD YOU BOUGHT JUST DIDN'T LAST AND YOU DIDN'T HAVE MONEY TO GET MORE.: NEVER TRUE

## 2023-02-13 SDOH — ECONOMIC STABILITY: HOUSING INSECURITY
IN THE LAST 12 MONTHS, WAS THERE A TIME WHEN YOU DID NOT HAVE A STEADY PLACE TO SLEEP OR SLEPT IN A SHELTER (INCLUDING NOW)?: NO

## 2023-02-13 SDOH — ECONOMIC STABILITY: INCOME INSECURITY: HOW HARD IS IT FOR YOU TO PAY FOR THE VERY BASICS LIKE FOOD, HOUSING, MEDICAL CARE, AND HEATING?: NOT HARD AT ALL

## 2023-02-13 ASSESSMENT — ENCOUNTER SYMPTOMS
RESPIRATORY NEGATIVE: 1
DIARRHEA: 1
VOMITING: 1
BLOOD IN STOOL: 0
ABDOMINAL PAIN: 0
BLOATING: 1
NAUSEA: 0
COUGH: 0

## 2023-02-13 ASSESSMENT — PATIENT HEALTH QUESTIONNAIRE - PHQ9
SUM OF ALL RESPONSES TO PHQ QUESTIONS 1-9: 1
SUM OF ALL RESPONSES TO PHQ9 QUESTIONS 1 & 2: 1
SUM OF ALL RESPONSES TO PHQ QUESTIONS 1-9: 1
1. LITTLE INTEREST OR PLEASURE IN DOING THINGS: 1
SUM OF ALL RESPONSES TO PHQ QUESTIONS 1-9: 1
SUM OF ALL RESPONSES TO PHQ QUESTIONS 1-9: 1
2. FEELING DOWN, DEPRESSED OR HOPELESS: 0

## 2023-02-13 ASSESSMENT — PAIN SCALES - GENERAL
PAINLEVEL_OUTOF10: 3
PAINLEVEL_OUTOF10: 3
PAINLEVEL_OUTOF10: 2

## 2023-02-13 ASSESSMENT — PAIN DESCRIPTION - LOCATION
LOCATION: ABDOMEN;BACK
LOCATION: BACK

## 2023-02-13 ASSESSMENT — PAIN - FUNCTIONAL ASSESSMENT: PAIN_FUNCTIONAL_ASSESSMENT: 0-10

## 2023-02-13 NOTE — PROGRESS NOTES
[facility]             [address], [address]                        Telephone [phone]             Fax [phone]     Katarzyna King  1945  [facility]:[id number]   Date of visit:  2/13/2023    Subjective:    Katarzyna King is a 68 y.o.  male who presents to [facility] today (2/13/2023) for evaluation of:    Chief Complaint   Patient presents with    Diarrhea     Pt reports severe \"indigestion\" with abd bloating/gas and diarrhea x 3 weeks. Pt reports he has been using a \"low fat diet\". The diarrhea seems to follow the \"indigestion\" and he will have a few good days and then symptoms return. Pt also with nausea and vomiting about 4 times in the last 3 weeks which helps his abd feel better. Diarrhea   This is a new problem. The current episode started 1 to 4 weeks ago (X 2-3 weeks). The problem occurs less than 2 times per day. The problem has been waxing and waning. The stool consistency is described as Watery. The patient states that diarrhea does not awaken him from sleep. Associated symptoms include bloating and vomiting (3 episodes over the 2-3 weeks). Pertinent negatives include no abdominal pain, coughing, fever, headaches, URI or weight loss. Associated symptoms comments: Acid reflux intermittent then explosive diarrhea 12 hours later. This clears up for 2-3 days then returns. This morning he ate frozen breakfast bowl with ham, eggs, shredded potatoes, and Can't Believe it's not butter. This afternoon he ate a pastry roll. Diarrhea stool about 4 hours after eating. History of chronic constipation d/t Norco.. Nothing aggravates the symptoms. There are no known risk factors. Treatments tried: rolaids, mylanta, isadora seltzer, Bentyl, Protonix, bland diet. The treatment provided no relief.      He has the following problem list:  Patient Active Problem List   Diagnosis    Hypertension Prostate cancer ([facility] [address]    Hyperlipidemia    Troponin level elevated    Enteritis    CAD (coronary artery disease) SP drug eluting stent 2 stents to RCA and 1 to L circ     NSTEMI (non-ST elevated myocardial infarction) ([facility] [address]    Type 2 diabetes mellitus without complication (HCC)    History of colon polyps    Chronic midline low back pain with bilateral sciatica    Bronchiectasis with acute lower respiratory infection (HCC)        Current medications are:  Current Outpatient Medications   Medication Sig Dispense Refill    dicyclomine (BENTYL) 10 MG capsule take 1 capsule by mouth four times a day if needed for BOWEL CRAMPING OR BLOATING 30 capsule 2    metoprolol tartrate (LOPRESSOR) 50 MG tablet take 1 tablet by mouth twice a day 180 tablet 3    levalbuterol (XOPENEX HFA) 45 MCG/ACT inhaler Inhale 1 puff into the lungs every 6 hours as needed for Wheezing or Shortness of Breath 1 each 5    pantoprazole (PROTONIX) 40 MG tablet take 1 tablet by mouth once daily 90 tablet 3    atorvastatin (LIPITOR) 40 MG tablet take 1 tablet by mouth once daily 90 tablet 3    ondansetron (ZOFRAN) 4 MG tablet take 1 tablet by mouth every 8 hours if needed for nausea and vomiting 30 tablet 0    clopidogrel (PLAVIX) 75 MG tablet take 1 tablet by mouth daily 90 tablet 3    diclofenac sodium (VOLTAREN) 1 % GEL Apply 4 g topically 4 times daily as needed for Pain 150 g 2    HYDROcodone-acetaminophen (NORCO)  MG per tablet Take 1 tablet by mouth every 6 hours as needed for Pain.       nitroGLYCERIN (NITROSTAT) 0.4 MG SL tablet Place 1 tablet under the tongue every 5 minutes as needed for Chest pain 25 tablet 3    fexofenadine (ALLEGRA) 180 MG tablet take 1 tablet by mouth once daily (Patient not taking: Reported on 2/13/2023) 90 tablet 3    celecoxib (CELEBREX) 200 MG capsule Take 200 mg by mouth daily (Patient not taking: Reported on 2/13/2023)      cyclobenzaprine (FLEXERIL) 10 MG tablet Take 1 tablet by mouth 3 times daily as needed for Muscle spasms (Patient not taking: Reported on 2/13/2023) 21 tablet 0    meloxicam (MOBIC) 15 MG tablet Take 15 mg by mouth daily (Patient not taking: Reported on 2/13/2023)      triamcinolone (KENALOG) 0.1 % cream Apply topically 2 times daily. 45 g 3     No current facility-administered medications for this visit. He is allergic to zonisamide. Otto Macdonald He  reports that he quit smoking about 35 years ago. His smoking use included cigarettes. He has a 10.00 pack-year smoking history. He has never used smokeless tobacco.      Objective:    Vitals:    02/13/23 1655 02/13/23 1704   BP: (!) 160/78 (!) 140/70   Site: Left Upper Arm Left Upper Arm   Position: Sitting Sitting   Cuff Size: Medium Adult Medium Adult   Pulse: 66    Resp: 20    Temp: 98.3 °F (36.8 °C)    TempSrc: Tympanic    SpO2: 96%    Weight: 145 lb (65.8 kg)    Height: 5' 6\" (1.676 m)      Body mass index is 23.4 kg/m². Review of Systems   Constitutional: Negative. Negative for fever and weight loss. Respiratory: Negative. Negative for cough. Cardiovascular: Negative. Gastrointestinal:  Positive for bloating, diarrhea and vomiting (3 episodes over the 2-3 weeks). Negative for abdominal pain, blood in stool and nausea. Neurological:  Negative for headaches. Physical Exam  Vitals and nursing note reviewed. Constitutional:       Appearance: Normal appearance. He is well-developed. HENT:      Head: Normocephalic. Jaw: There is normal jaw occlusion. Mouth/Throat:      Lips: Pink. Mouth: Mucous membranes are moist.      Pharynx: Oropharynx is clear. Uvula midline. Eyes:      Pupils: Pupils are equal, round, and reactive to light. Cardiovascular:      Rate and Rhythm: Normal rate and regular rhythm. Heart sounds: Normal heart sounds. Pulmonary:      Effort: Pulmonary effort is normal.      Breath sounds: Normal breath sounds and air entry. Abdominal:      General: Abdomen is flat.  Bowel sounds are increased. There is no distension. Palpations: Abdomen is soft. Tenderness: There is abdominal tenderness (moderate) in the right upper quadrant. Musculoskeletal:      Cervical back: Normal range of motion and neck supple. Skin:     General: Skin is warm and dry. Neurological:      General: No focal deficit present. Mental Status: He is alert and oriented to person, place, and time. Psychiatric:         Behavior: Behavior normal.         Thought Content: Thought content normal.       Assessment and Plan:    No results found for this visit on 02/13/23. Diagnosis Orders   1. RUQ pain          I discussed with Juanis Santiago that due to RUQ pain and diarrhea, I thought it would be best for him to be evaluated at the emergency room. Juanis Santiago was in agreement, and he was transported via wheelchair by Urgent Care staff to [facility] [place] ER. Report given to Dr. Pricilla Atkinson in ER. The use, risks, benefits, and side effects of prescribed or recommended medications were discussed. All questions were answered and the patient/caregiver voiced understanding. No orders of the defined types were placed in this encounter.         Electronically signed by PAYTON Rivera CNP on 2/13/23 at 5:31 PM EST

## 2023-02-13 NOTE — ED PROVIDER NOTES
[address]      Pt Name: Annamaria Harris  MRN: [id number]  [place] 1945  Date of evaluation: 2/13/2023      CHIEF COMPLAINT       Chief Complaint   Patient presents with    Emesis    Abdominal Pain    Diarrhea         HISTORY OF PRESENT ILLNESS      The patient was sent here from urgent care for vomiting, diarrhea, and abdominal bloating. This has been going on for couple of weeks. The patient has a history of prostate cancer. He has incontinence chronically. The patient denies recent fever. He has not noted blood in his stool or emesis. He has not noted a fever. He says he feels less bloated today. He has not had a cholecystectomy or appendectomy. REVIEW OF SYSTEMS       All systems reviewed and negative unless noted in HPI. The patient denies fever or constitutional symptoms. Denies vision change. Denies any sore throat or rhinorrhea. Denies any neck pain or stiffness. Denies chest pain or shortness of breath. Vomiting, diarrhea, and bloating as noted in HPI. Urinary incontinence  Denies musculoskeletal injury or pain. Denies any weakness, numbness or focal neurologic deficit. Denies any skin rash or edema. No recent psychiatric issues. No easy bruising or bleeding. Denies any polyuria, polydypsia or history of immunocompromise. PAST MEDICAL HISTORY    has a past medical history of Chronic bronchitis ([address] DJD (degenerative joint disease), GERD (gastroesophageal reflux disease), Hearing loss, Hyperlipidemia, Hypertension, Osteoarthritis of left knee, Overactive bladder, Prostate cancer ([address] Seasonal allergies, Spondylosis, and Type II or unspecified type diabetes mellitus without mention of complication, not stated as uncontrolled. SURGICAL HISTORY      has a past surgical history that includes Upper gastrointestinal endoscopy (03/15/2002); Colonoscopy (03/15/2002); Prostatectomy (02/09/2010);  Total knee arthroplasty (Left, 2013); Tonsillectomy; Cardiac surgery; Coronary angioplasty with stent; other surgical history (2009); pr colon ca scrn not hi rsk ind (N/A, 2017); and Total knee arthroplasty (Right, 2018). CURRENT MEDICATIONS       Previous Medications    ATORVASTATIN (LIPITOR) 40 MG TABLET    take 1 tablet by mouth once daily    CELECOXIB (CELEBREX) 200 MG CAPSULE    Take 200 mg by mouth daily    CLOPIDOGREL (PLAVIX) 75 MG TABLET    take 1 tablet by mouth daily    CYCLOBENZAPRINE (FLEXERIL) 10 MG TABLET    Take 1 tablet by mouth 3 times daily as needed for Muscle spasms    DICLOFENAC SODIUM (VOLTAREN) 1 % GEL    Apply 4 g topically 4 times daily as needed for Pain    DICYCLOMINE (BENTYL) 10 MG CAPSULE    take 1 capsule by mouth four times a day if needed for BOWEL CRAMPING OR BLOATING    FEXOFENADINE (ALLEGRA) 180 MG TABLET    take 1 tablet by mouth once daily    HYDROCODONE-ACETAMINOPHEN (NORCO)  MG PER TABLET    Take 1 tablet by mouth every 6 hours as needed for Pain. LEVALBUTEROL (XOPENEX HFA) 45 MCG/ACT INHALER    Inhale 1 puff into the lungs every 6 hours as needed for Wheezing or Shortness of Breath    MELOXICAM (MOBIC) 15 MG TABLET    Take 15 mg by mouth daily    METOPROLOL TARTRATE (LOPRESSOR) 50 MG TABLET    take 1 tablet by mouth twice a day    NITROGLYCERIN (NITROSTAT) 0.4 MG SL TABLET    Place 1 tablet under the tongue every 5 minutes as needed for Chest pain    ONDANSETRON (ZOFRAN) 4 MG TABLET    take 1 tablet by mouth every 8 hours if needed for nausea and vomiting    PANTOPRAZOLE (PROTONIX) 40 MG TABLET    take 1 tablet by mouth once daily    TRIAMCINOLONE (KENALOG) 0.1 % CREAM    Apply topically 2 times daily. ALLERGIES     is allergic to zonisamide. FAMILY HISTORY     He indicated that his mother is . He indicated that his father is . He indicated that his maternal grandmother is .  He indicated that his maternal grandfather is . He indicated that his paternal grandmother is . He indicated that his paternal grandfather is . family history includes Stroke in his father. SOCIAL HISTORY      reports that he quit smoking about 35 years ago. His smoking use included cigarettes. He has a 10.00 pack-year smoking history. He has never used smokeless tobacco. He reports that he does not drink alcohol and does not use drugs. PHYSICAL EXAM     INITIAL VITALS:  height is 5' 6\" (1.676 m) and weight is 145 lb (65.8 kg). His tympanic temperature is 98.1 °F (36.7 °C). His blood pressure is 152/65 (abnormal) and his pulse is 62. His respiration is 18 and oxygen saturation is 97%. The patient is alert and oriented, in no apparent distress. HEENT is atraumatic. Pupils are PERRL at 4 mm. Mucous membranes moist.    Neck is supple . Heart sounds regular rate and rhythm with no gallops, murmurs, or rubs. Lungs clear, no wheezes, rales or rhonchi. Abdomen: soft, with moderate tenderness in right lower quadrant and mild tenderness in the left upper quadrant. No pulsatile mass. Normal bowel sounds are noted. No rebound or guarding. Musculoskeletal exam shows no evidence of trauma. Normal distal pulses in all extremities. Skin: no rash or edema. Neurological exam reveals cranial nerves 2 through 12 grossly intact. Patient has equal  and normal deep tendon reflexes. Psychiatric: no hallucinations or suicidal ideation. Lymphatics.:  No lymphadenopathy. DIFFERENTIAL DIAGNOSIS/ MDM:     Differential diagnosis considered:  Bowel obstruction, diarrhea, colitis, cholecystitis, dehydration, electrolyte imbalance, UTI    Chronic Conditions affecting care (DM,HTN,CA, etc): None      Social Determinants of Health affecting care (unable to care for self, lives alone, unemployed, homeless,etc): None      History source(s) (patient,spouse,parent,family,friend,EMS,etc): Patient      Review of external sources ([facility],Hospital records,EMS report, radiology reports, etc): Previous hospital records      Tests considered but not ordered: None      Independent interpretation of tests (eg.  X-ray, CAT scan, Doppler studies, EKG): None      Discussion of x-ray results with radiology: Not applicable      Consults: None      Consideration for admission/observation (even if discharged):  Not applicable      Prescription considerations: None      Sepsis considered: Not applicable      Critical Care note written: Not applicable        DIAGNOSTIC RESULTS         RADIOLOGY:   I reviewed the radiologist interpretations:  CT ABDOMEN PELVIS WO CONTRAST Additional Contrast? None    (Results Pending)         LABS:  Results for orders placed or performed during the hospital encounter of 02/13/23   CBC with Auto Differential   Result Value Ref Range    WBC 5.4 3.5 - 11.3 k/uL    RBC 4.82 4.21 - 5.77 m/uL    Hemoglobin 14.0 13.0 - 17.0 g/dL    Hematocrit 41.1 40.7 - 50.3 %    MCV 85.3 82.6 - 102.9 fL    MCH 29.0 25.2 - 33.5 pg    MCHC 34.1 (H) 25.2 - 33.5 g/dL    RDW 12.6 11.8 - 14.4 %    Platelets 239 728 - 418 k/uL    MPV 9.0 8.1 - 13.5 fL    NRBC Automated 0.0 0.0 per 100 WBC    Seg Neutrophils 61 36 - 65 %    Lymphocytes 20 (L) 24 - 43 %    Monocytes 10 3 - 12 %    Eosinophils % 8 (H) 1 - 4 %    Basophils 1 0 - 2 %    Immature Granulocytes 0 0 %    Segs Absolute 3.32 1.50 - 8.10 k/uL    Absolute Lymph # 1.07 (L) 1.10 - 3.70 k/uL    Absolute Mono # 0.51 0.10 - 1.20 k/uL    Absolute Eos # 0.43 0.00 - 0.44 k/uL    Basophils Absolute 0.03 0.00 - 0.20 k/uL    Absolute Immature Granulocyte <0.03 0.00 - 0.30 k/uL   Basic Metabolic Panel   Result Value Ref Range    Glucose 106 (H) 70 - 99 mg/dL    BUN 21 8 - 23 mg/dL    Creatinine 0.89 0.70 - 1.20 mg/dL    Est, Glom Filt Rate >60 >60 mL/min/1.73m2    Bun/Cre Ratio 24 (H) 9 - 20    Calcium 9.1 8.6 - 10.4 mg/dL    Sodium 142 135 - 144 mmol/L    Potassium 4.4 3.7 - 5.3 mmol/L    Chloride 102 98 - 107 mmol/L    CO2 29 20 - 31 mmol/L    Anion Gap 11 9 - 17 mmol/L   Hepatic Function Panel   Result Value Ref Range    Albumin 4.2 3.5 - 5.2 g/dL    Alkaline Phosphatase 137 (H) 40 - 129 U/L    ALT 29 5 - 41 U/L    AST 28 <40 U/L    Total Bilirubin 0.4 0.3 - 1.2 mg/dL    Bilirubin, Direct <0.1 <0.3 mg/dL    Bilirubin, Indirect Can not be calculated 0.0 - 1.0 mg/dL    Total Protein 6.6 6.4 - 8.3 g/dL    Globulin 2.4 1.5 - 3.8 g/dL    Albumin/Globulin Ratio 1.8 1.0 - 2.5   Lipase   Result Value Ref Range    Lipase 12 (L) 13 - 60 U/L   Amylase   Result Value Ref Range    Amylase 89 28 - 100 U/L         EMERGENCY DEPARTMENT COURSE:   Vitals:    Vitals:    02/13/23 1810 02/13/23 1832   BP: (!) 175/89 (!) 152/65   Pulse: 62    Resp: 18    Temp: 98.1 °F (36.7 °C)    TempSrc: Tympanic    SpO2: 97%    Weight: 145 lb (65.8 kg)    Height: 5' 6\" (1.676 m)      -------------------------  BP: (!) 152/65, Temp: 98.1 °F (36.7 °C), Heart Rate: 62, Resp: 18      Re-evaluation Notes    The patient be endorsed to Dr. Rox Sandoval secondary to end of shift. Please refer to her documentation. FINAL IMPRESSION    No diagnosis found. DISPOSITION/PLAN   DISPOSITION        Condition on Disposition    stable    PATIENT REFERRED TO:  No follow-up provider specified.     DISCHARGE MEDICATIONS:  New Prescriptions    No medications on file       (Please note that portions of this note were completed with a voice recognition program.  Efforts were made to edit the dictations but occasionally words are mis-transcribed.)    Marilia Avilez MD,, MD   Attending Emergency Physician         Fabiola Lutz MD  02/14/23 [id number]

## 2023-02-14 NOTE — DISCHARGE INSTRUCTIONS
Please follow-up with your PCP within 1 to 2 days. Please call your spine doctor to let them know of your incidental finding on CT scan. Return to the emergency department immediately if you have new numbness or weakness of the legs, incontinence of bowel or bladder function, numbness between the legs, or if you experience further abdominal pain, fevers, persistent nausea vomiting, not tolerating fluids by mouth, chest pain or difficulty breathing, bloody stool, or any other acute concerns.

## 2023-02-14 NOTE — ED PROVIDER NOTES
ADDENDUM:      Care of this patient was assumed from Dr. Savannah Gutierrez. The patient was seen for Emesis, Abdominal Pain, and Diarrhea  . The patient's initial evaluation and plan have been discussed with the prior provider who initially evaluated the patient. Nursing Notes, Past Medical Hx, Past Surgical Hx, Social Hx, Family Hx, Medications and Allergies, and  were all reviewed. Diagnostic Results     EKG   None    RADIOLOGY:  CT ABDOMEN PELVIS WO CONTRAST Additional Contrast? None    Result Date: 2/13/2023  EXAMINATION: CT OF THE ABDOMEN AND PELVIS WITHOUT CONTRAST 2/13/2023 6:48 pm TECHNIQUE: CT of the abdomen and pelvis was performed without the administration of intravenous contrast. Multiplanar reformatted images are provided for review. Automated exposure control, iterative reconstruction, and/or weight based adjustment of the mA/kV was utilized to reduce the radiation dose to as low as reasonably achievable. COMPARISON: 03/03/2020 HISTORY: ORDERING SYSTEM PROVIDED HISTORY: bloating and diarrhea TECHNOLOGIST PROVIDED HISTORY: bloating and diarrhea Decision Support Exception - unselect if not a suspected or confirmed emergency medical condition->Emergency Medical Condition (MA) Reason for Exam: low back pain FINDINGS: LOWER CHEST:  Visualized portion of the lower chest demonstrates no acute abnormality. KIDNEYS AND URINARY TRACT: No renal calculi are identified. There is no evidence for hydronephrosis. The ureters are of normal course and caliber. ORGANS: Visualized portions of the liver, spleen, pancreas, gallbladder, and adrenal glands demonstrate no acute abnormality. GI/BOWEL: No bowel obstruction. Appendix is not well visualized however there are no findings to suggest acute appendicitis. .  Diverticulosis with no evidence of diverticulitis. PELVIS: The bladder and pelvic organs are unremarkable. Penile prosthesis is in place. PERITONEUM/RETROPERITONEUM: No free air or free fluid is noted.   No pathologically enlarged lymphadenopathy. The vasculature do not demonstrate acute abnormality. BONES/SOFT TISSUES: The osseous structures demonstrate no acute abnormality. Age indeterminate superior endplate compression fracture of T12 with 10% height loss. Multilevel disc and facet degenerative disease most pronounced at L4-L5 and L5-S1 and T12-L1 and T11-T12. No acute pathology within the abdomen and pelvis. No obstructive uropathy. Appendix is not well visualized however there are no findings to suggest acute appendicitis. Age indeterminate superior endplate compression fracture of T12 with 10% height loss. Diverticulosis with no evidence of diverticulitis.         LABS:   Results for orders placed or performed during the hospital encounter of 02/13/23   CBC with Auto Differential   Result Value Ref Range    WBC 5.4 3.5 - 11.3 k/uL    RBC 4.82 4.21 - 5.77 m/uL    Hemoglobin 14.0 13.0 - 17.0 g/dL    Hematocrit 41.1 40.7 - 50.3 %    MCV 85.3 82.6 - 102.9 fL    MCH 29.0 25.2 - 33.5 pg    MCHC 34.1 (H) 25.2 - 33.5 g/dL    RDW 12.6 11.8 - 14.4 %    Platelets 249 412 - 142 k/uL    MPV 9.0 8.1 - 13.5 fL    NRBC Automated 0.0 0.0 per 100 WBC    Seg Neutrophils 61 36 - 65 %    Lymphocytes 20 (L) 24 - 43 %    Monocytes 10 3 - 12 %    Eosinophils % 8 (H) 1 - 4 %    Basophils 1 0 - 2 %    Immature Granulocytes 0 0 %    Segs Absolute 3.32 1.50 - 8.10 k/uL    Absolute Lymph # 1.07 (L) 1.10 - 3.70 k/uL    Absolute Mono # 0.51 0.10 - 1.20 k/uL    Absolute Eos # 0.43 0.00 - 0.44 k/uL    Basophils Absolute 0.03 0.00 - 0.20 k/uL    Absolute Immature Granulocyte <0.03 0.00 - 0.30 k/uL   Basic Metabolic Panel   Result Value Ref Range    Glucose 106 (H) 70 - 99 mg/dL    BUN 21 8 - 23 mg/dL    Creatinine 0.89 0.70 - 1.20 mg/dL    Est, Glom Filt Rate >60 >60 mL/min/1.73m2    Bun/Cre Ratio 24 (H) 9 - 20    Calcium 9.1 8.6 - 10.4 mg/dL    Sodium 142 135 - 144 mmol/L    Potassium 4.4 3.7 - 5.3 mmol/L    Chloride 102 98 - 107 mmol/L CO2 29 20 - 31 mmol/L    Anion Gap 11 9 - 17 mmol/L   Hepatic Function Panel   Result Value Ref Range    Albumin 4.2 3.5 - 5.2 g/dL    Alkaline Phosphatase 137 (H) 40 - 129 U/L    ALT 29 5 - 41 U/L    AST 28 <40 U/L    Total Bilirubin 0.4 0.3 - 1.2 mg/dL    Bilirubin, Direct <0.1 <0.3 mg/dL    Bilirubin, Indirect Can not be calculated 0.0 - 1.0 mg/dL    Total Protein 6.6 6.4 - 8.3 g/dL    Globulin 2.4 1.5 - 3.8 g/dL    Albumin/Globulin Ratio 1.8 1.0 - 2.5   Lipase   Result Value Ref Range    Lipase 12 (L) 13 - 60 U/L   Amylase   Result Value Ref Range    Amylase 89 28 - 100 U/L   Urinalysis with Reflex to Culture    Specimen: Urine, clean catch   Result Value Ref Range    Color, UA Yellow Yellow    Turbidity UA Clear Clear    Glucose, Ur NEGATIVE NEGATIVE    Bilirubin Urine NEGATIVE NEGATIVE    Ketones, Urine NEGATIVE NEGATIVE    Specific Gravity, UA 1.020 1.010 - 1.025    Urine Hgb NEGATIVE NEGATIVE    pH, UA 8.5 (H) 5.0 - 6.0    Protein, UA NEGATIVE NEGATIVE    Urobilinogen, Urine Normal Normal    Nitrite, Urine NEGATIVE NEGATIVE    Leukocyte Esterase, Urine NEGATIVE NEGATIVE    Urinalysis Comments       Microscopic exam not performed based on chemical results unless requested in original order. Urinalysis Comments          Urinalysis Comments       Utilizing a urinalysis as the only screening method to exclude a potential uropathogen can be unreliable in many patient populations. Rapid screening tests are less sensitive than culture and if UTI is a clinical possibility, culture should be considered despite a negative urinalysis.      Lactic Acid   Result Value Ref Range    Lactic Acid 0.8 0.5 - 2.2 mmol/L   Troponin   Result Value Ref Range    Troponin, High Sensitivity 9 0 - 22 ng/L       RECENT VITALS:  BP: (!) 183/73 (pt states he is due for BP medication and will take it when he gets home), Temp: 98.1 °F (36.7 °C), Heart Rate: 56, Resp: 17     ED Course     The patient was given the following medications:  Orders Placed This Encounter   Medications    0.9 % sodium chloride bolus    morphine injection 4 mg    ondansetron (ZOFRAN) injection 4 mg       Medical Decision Making      Case signed out to me by Dr. Emmett Suh. I reviewed the history and physical.  Patient states this starts as indigestion and then nausea and occasionally bloating and diarrheal symptoms. He has 3 episodes of emesis within the last 2 weeks as long as these symptoms have been going on. Patient has mild tenderness but without rebound rigidity or guarding. Work-up is reassuring. He was agreeable to further blood work such as a lactic and troponin both within normal limits. Patient was observed in the emergency department for up to 4 hours that he had the IV morphine. He also sees pain management at. He was unaware incidental finding of a T12 compression fracture. He does not have any new numbness or weakness incontinence saddle paresthesia. He will follow-up with his back team about this. Disposition     FINAL IMPRESSION      1. Generalized abdominal pain    2. Nausea and vomiting, unspecified vomiting type    3. Diarrhea, unspecified type    4.  Compression fracture of T12 vertebra, initial encounter [facility])          DISPOSITION/PLAN   DISPOSITION Decision To Discharge 02/13/2023 09:27:41 PM      PATIENT REFERRED TO:  Greg Lozada MD  [facility] [address]  [address] [place] [id number]    In 2 days      DISCHARGE MEDICATIONS:  Discharge Medication List as of 2/13/2023 10:45 PM                (Please note that portions of this note were completed with a voice recognition program.  Efforts were made to edit the dictations but occasionally words are mis-transcribed.)    Vince Johnson MD, [facility]  Attending Emergency Medicine Physician                 Vince Johnson MD  02/14/23 [id number]

## 2023-02-16 ENCOUNTER — TELEPHONE (OUTPATIENT)
Dept: CARDIOLOGY | Age: 78
End: 2023-02-16

## 2023-02-16 NOTE — TELEPHONE ENCOUNTER
I spoke to pt. He denies cp or other cardiac symptoms. Cardio appt rescheduled to March. Cardiac clearance completed by Dr. Trudy Wilson and faxed with records. See scan.

## 2023-02-23 RX ORDER — CLOPIDOGREL BISULFATE 75 MG/1
TABLET ORAL
Qty: 90 TABLET | Refills: 3 | Status: SHIPPED | OUTPATIENT
Start: 2023-02-23

## 2023-02-23 NOTE — TELEPHONE ENCOUNTER
Last Appt:  8/18/2022  Next Appt:   3/10/2023  Med verified in [facility] [address]

## 2023-03-03 ENCOUNTER — HOSPITAL ENCOUNTER (INPATIENT)
Age: 78
LOS: 2 days | Discharge: HOME | DRG: 394 | End: 2023-03-05
Attending: EMERGENCY MEDICINE | Admitting: FAMILY MEDICINE
Payer: MEDICARE

## 2023-03-03 ENCOUNTER — OFFICE VISIT (OUTPATIENT)
Dept: PRIMARY CARE CLINIC | Age: 78
End: 2023-03-03
Payer: MEDICARE

## 2023-03-03 ENCOUNTER — APPOINTMENT (OUTPATIENT)
Dept: GENERAL RADIOLOGY | Age: 78
DRG: 394 | End: 2023-03-03
Payer: MEDICARE

## 2023-03-03 ENCOUNTER — APPOINTMENT (OUTPATIENT)
Dept: CT IMAGING | Age: 78
DRG: 394 | End: 2023-03-03
Payer: MEDICARE

## 2023-03-03 VITALS
WEIGHT: 135.8 LBS | BODY MASS INDEX: 21.92 KG/M2 | DIASTOLIC BLOOD PRESSURE: 64 MMHG | TEMPERATURE: 97.2 F | SYSTOLIC BLOOD PRESSURE: 80 MMHG | OXYGEN SATURATION: 98 % | HEART RATE: 54 BPM

## 2023-03-03 DIAGNOSIS — K66.8 PNEUMOPERITONEUM OF UNKNOWN ETIOLOGY: Primary | ICD-10-CM

## 2023-03-03 DIAGNOSIS — R10.9 ABDOMINAL PAIN, UNSPECIFIED ABDOMINAL LOCATION: ICD-10-CM

## 2023-03-03 DIAGNOSIS — R19.7 DIARRHEA, UNSPECIFIED TYPE: Primary | ICD-10-CM

## 2023-03-03 PROBLEM — M12.812 ROTATOR CUFF TEAR ARTHROPATHY OF BOTH SHOULDERS: Status: ACTIVE | Noted: 2019-04-16

## 2023-03-03 PROBLEM — M48.062 SPINAL STENOSIS, LUMBAR REGION, WITH NEUROGENIC CLAUDICATION: Status: ACTIVE | Noted: 2021-03-15

## 2023-03-03 PROBLEM — C61 ADENOCARCINOMA OF PROSTATE (HCC): Status: ACTIVE | Noted: 2017-11-28

## 2023-03-03 PROBLEM — M12.811 ROTATOR CUFF TEAR ARTHROPATHY OF BOTH SHOULDERS: Status: ACTIVE | Noted: 2019-04-16

## 2023-03-03 PROBLEM — K21.9 GASTROESOPHAGEAL REFLUX DISEASE WITHOUT ESOPHAGITIS: Status: ACTIVE | Noted: 2022-04-05

## 2023-03-03 PROBLEM — G95.19 NEUROGENIC CLAUDICATION (HCC): Status: ACTIVE | Noted: 2023-03-03

## 2023-03-03 PROBLEM — M75.102 ROTATOR CUFF TEAR ARTHROPATHY OF BOTH SHOULDERS: Status: ACTIVE | Noted: 2019-04-16

## 2023-03-03 PROBLEM — R29.818 NEUROGENIC CLAUDICATION: Status: ACTIVE | Noted: 2023-03-03

## 2023-03-03 PROBLEM — T83.111A: Status: ACTIVE | Noted: 2022-03-07

## 2023-03-03 PROBLEM — M51.37 DEGENERATION OF INTERVERTEBRAL DISC OF LUMBOSACRAL REGION: Status: ACTIVE | Noted: 2020-09-30

## 2023-03-03 PROBLEM — M75.101 ROTATOR CUFF TEAR ARTHROPATHY OF BOTH SHOULDERS: Status: ACTIVE | Noted: 2019-04-16

## 2023-03-03 PROBLEM — M51.379 DEGENERATION OF INTERVERTEBRAL DISC OF LUMBOSACRAL REGION: Status: ACTIVE | Noted: 2020-09-30

## 2023-03-03 PROBLEM — J47.0 BRONCHIECTASIS WITH ACUTE LOWER RESPIRATORY INFECTION (HCC): Status: RESOLVED | Noted: 2020-07-01 | Resolved: 2023-03-03

## 2023-03-03 LAB
ABSOLUTE EOS #: 0.36 K/UL (ref 0–0.44)
ABSOLUTE IMMATURE GRANULOCYTE: <0.03 K/UL (ref 0–0.3)
ABSOLUTE LYMPH #: 0.74 K/UL (ref 1.1–3.7)
ABSOLUTE MONO #: 0.41 K/UL (ref 0.1–1.2)
ALBUMIN SERPL-MCNC: 3.9 G/DL (ref 3.5–5.2)
ALBUMIN/GLOBULIN RATIO: 2 (ref 1–2.5)
ALP SERPL-CCNC: 149 U/L (ref 40–129)
ALT SERPL-CCNC: 22 U/L (ref 5–41)
ANION GAP SERPL CALCULATED.3IONS-SCNC: 8 MMOL/L (ref 9–17)
AST SERPL-CCNC: 26 U/L
BASOPHILS # BLD: 1 % (ref 0–2)
BASOPHILS ABSOLUTE: 0.03 K/UL (ref 0–0.2)
BILIRUB SERPL-MCNC: 0.6 MG/DL (ref 0.3–1.2)
BUN SERPL-MCNC: 11 MG/DL (ref 8–23)
BUN/CREAT BLD: 13 (ref 9–20)
CALCIUM SERPL-MCNC: 9 MG/DL (ref 8.6–10.4)
CHLORIDE SERPL-SCNC: 102 MMOL/L (ref 98–107)
CO2 SERPL-SCNC: 28 MMOL/L (ref 20–31)
CREAT SERPL-MCNC: 0.85 MG/DL (ref 0.7–1.2)
EOSINOPHILS RELATIVE PERCENT: 9 % (ref 1–4)
GFR SERPL CREATININE-BSD FRML MDRD: >60 ML/MIN/1.73M2
GLUCOSE SERPL-MCNC: 120 MG/DL (ref 70–99)
HCT VFR BLD AUTO: 40.8 % (ref 40.7–50.3)
HGB BLD-MCNC: 13.8 G/DL (ref 13–17)
IMMATURE GRANULOCYTES: 1 %
LACTATE PLASV-SCNC: 1.4 MMOL/L (ref 0.5–2.2)
LYMPHOCYTES # BLD: 18 % (ref 24–43)
MCH RBC QN AUTO: 28.8 PG (ref 25.2–33.5)
MCHC RBC AUTO-ENTMCNC: 33.8 G/DL (ref 25.2–33.5)
MCV RBC AUTO: 85.2 FL (ref 82.6–102.9)
MONOCYTES # BLD: 10 % (ref 3–12)
NRBC AUTOMATED: 0 PER 100 WBC
PDW BLD-RTO: 12.4 % (ref 11.8–14.4)
PLATELET # BLD AUTO: 217 K/UL (ref 138–453)
PMV BLD AUTO: 9.2 FL (ref 8.1–13.5)
POTASSIUM SERPL-SCNC: 4.4 MMOL/L (ref 3.7–5.3)
PROT SERPL-MCNC: 5.9 G/DL (ref 6.4–8.3)
RBC # BLD: 4.79 M/UL (ref 4.21–5.77)
SARS-COV-2 RDRP RESP QL NAA+PROBE: NOT DETECTED
SEG NEUTROPHILS: 61 % (ref 36–65)
SEGMENTED NEUTROPHILS ABSOLUTE COUNT: 2.46 K/UL (ref 1.5–8.1)
SODIUM SERPL-SCNC: 138 MMOL/L (ref 135–144)
SPECIMEN DESCRIPTION: NORMAL
TROPONIN I SERPL DL<=0.01 NG/ML-MCNC: 8 NG/L (ref 0–22)
WBC # BLD AUTO: 4 K/UL (ref 3.5–11.3)

## 2023-03-03 PROCEDURE — 99999 PR OFFICE/OUTPT VISIT,PROCEDURE ONLY: CPT | Performed by: NURSE PRACTITIONER

## 2023-03-03 PROCEDURE — 99212 OFFICE O/P EST SF 10 MIN: CPT | Performed by: NURSE PRACTITIONER

## 2023-03-03 PROCEDURE — 99285 EMERGENCY DEPT VISIT HI MDM: CPT

## 2023-03-03 PROCEDURE — 2060000000 HC ICU INTERMEDIATE R&B

## 2023-03-03 PROCEDURE — 93005 ELECTROCARDIOGRAM TRACING: CPT | Performed by: EMERGENCY MEDICINE

## 2023-03-03 PROCEDURE — 85025 COMPLETE CBC W/AUTO DIFF WBC: CPT

## 2023-03-03 PROCEDURE — 2709999900 CT ABDOMEN PELVIS W IV CONTRAST

## 2023-03-03 PROCEDURE — 6360000002 HC RX W HCPCS: Performed by: EMERGENCY MEDICINE

## 2023-03-03 PROCEDURE — 2580000003 HC RX 258: Performed by: NURSE PRACTITIONER

## 2023-03-03 PROCEDURE — 87449 NOS EACH ORGANISM AG IA: CPT

## 2023-03-03 PROCEDURE — 2580000003 HC RX 258: Performed by: EMERGENCY MEDICINE

## 2023-03-03 PROCEDURE — 6370000000 HC RX 637 (ALT 250 FOR IP): Performed by: EMERGENCY MEDICINE

## 2023-03-03 PROCEDURE — C9113 INJ PANTOPRAZOLE SODIUM, VIA: HCPCS | Performed by: EMERGENCY MEDICINE

## 2023-03-03 PROCEDURE — 71045 X-RAY EXAM CHEST 1 VIEW: CPT

## 2023-03-03 PROCEDURE — 84484 ASSAY OF TROPONIN QUANT: CPT

## 2023-03-03 PROCEDURE — 80053 COMPREHEN METABOLIC PANEL: CPT

## 2023-03-03 PROCEDURE — A4216 STERILE WATER/SALINE, 10 ML: HCPCS | Performed by: EMERGENCY MEDICINE

## 2023-03-03 PROCEDURE — 99222 1ST HOSP IP/OBS MODERATE 55: CPT | Performed by: NURSE PRACTITIONER

## 2023-03-03 PROCEDURE — 87324 CLOSTRIDIUM AG IA: CPT

## 2023-03-03 PROCEDURE — 99222 1ST HOSP IP/OBS MODERATE 55: CPT | Performed by: SURGERY

## 2023-03-03 PROCEDURE — 96375 TX/PRO/DX INJ NEW DRUG ADDON: CPT

## 2023-03-03 PROCEDURE — 87635 SARS-COV-2 COVID-19 AMP PRB: CPT

## 2023-03-03 PROCEDURE — 96365 THER/PROPH/DIAG IV INF INIT: CPT

## 2023-03-03 PROCEDURE — 6360000004 HC RX CONTRAST MEDICATION: Performed by: EMERGENCY MEDICINE

## 2023-03-03 PROCEDURE — 83605 ASSAY OF LACTIC ACID: CPT

## 2023-03-03 PROCEDURE — 87506 IADNA-DNA/RNA PROBE TQ 6-11: CPT

## 2023-03-03 PROCEDURE — 2500000003 HC RX 250 WO HCPCS: Performed by: NURSE PRACTITIONER

## 2023-03-03 RX ORDER — SODIUM CHLORIDE 0.9 % (FLUSH) 0.9 %
5-40 SYRINGE (ML) INJECTION EVERY 12 HOURS SCHEDULED
Status: DISCONTINUED | OUTPATIENT
Start: 2023-03-04 | End: 2023-03-05 | Stop reason: HOSPADM

## 2023-03-03 RX ORDER — SODIUM CHLORIDE FOR INHALATION 0.9 %
3 VIAL, NEBULIZER (ML) INHALATION EVERY 4 HOURS PRN
Status: DISCONTINUED | OUTPATIENT
Start: 2023-03-03 | End: 2023-03-05 | Stop reason: HOSPADM

## 2023-03-03 RX ORDER — SODIUM CHLORIDE 9 MG/ML
INJECTION, SOLUTION INTRAVENOUS CONTINUOUS
Status: DISCONTINUED | OUTPATIENT
Start: 2023-03-04 | End: 2023-03-05

## 2023-03-03 RX ORDER — METOPROLOL TARTRATE 5 MG/5ML
2.5 INJECTION INTRAVENOUS EVERY 4 HOURS PRN
Status: DISCONTINUED | OUTPATIENT
Start: 2023-03-03 | End: 2023-03-05 | Stop reason: HOSPADM

## 2023-03-03 RX ORDER — ACETAMINOPHEN 650 MG/1
650 SUPPOSITORY RECTAL EVERY 6 HOURS PRN
Status: DISCONTINUED | OUTPATIENT
Start: 2023-03-03 | End: 2023-03-05 | Stop reason: HOSPADM

## 2023-03-03 RX ORDER — ACETAMINOPHEN 325 MG/1
650 TABLET ORAL EVERY 6 HOURS PRN
Status: DISCONTINUED | OUTPATIENT
Start: 2023-03-03 | End: 2023-03-05 | Stop reason: HOSPADM

## 2023-03-03 RX ORDER — SODIUM CHLORIDE FOR INHALATION 0.9 %
3 VIAL, NEBULIZER (ML) INHALATION
Status: DISCONTINUED | OUTPATIENT
Start: 2023-03-03 | End: 2023-03-05 | Stop reason: HOSPADM

## 2023-03-03 RX ORDER — SODIUM CHLORIDE 0.9 % (FLUSH) 0.9 %
5-40 SYRINGE (ML) INJECTION PRN
Status: DISCONTINUED | OUTPATIENT
Start: 2023-03-03 | End: 2023-03-05 | Stop reason: HOSPADM

## 2023-03-03 RX ORDER — 0.9 % SODIUM CHLORIDE 0.9 %
1000 INTRAVENOUS SOLUTION INTRAVENOUS ONCE
Status: COMPLETED | OUTPATIENT
Start: 2023-03-03 | End: 2023-03-03

## 2023-03-03 RX ORDER — ONDANSETRON 4 MG/1
4 TABLET, ORALLY DISINTEGRATING ORAL EVERY 8 HOURS PRN
Status: DISCONTINUED | OUTPATIENT
Start: 2023-03-03 | End: 2023-03-05 | Stop reason: HOSPADM

## 2023-03-03 RX ORDER — LEVALBUTEROL 1.25 MG/.5ML
1.25 SOLUTION, CONCENTRATE RESPIRATORY (INHALATION) EVERY 4 HOURS PRN
Status: DISCONTINUED | OUTPATIENT
Start: 2023-03-03 | End: 2023-03-05 | Stop reason: HOSPADM

## 2023-03-03 RX ORDER — SODIUM CHLORIDE 9 MG/ML
25 INJECTION, SOLUTION INTRAVENOUS PRN
Status: DISCONTINUED | OUTPATIENT
Start: 2023-03-03 | End: 2023-03-05 | Stop reason: HOSPADM

## 2023-03-03 RX ORDER — ONDANSETRON 2 MG/ML
4 INJECTION INTRAMUSCULAR; INTRAVENOUS EVERY 6 HOURS PRN
Status: DISCONTINUED | OUTPATIENT
Start: 2023-03-03 | End: 2023-03-05 | Stop reason: HOSPADM

## 2023-03-03 RX ADMIN — PIPERACILLIN AND TAZOBACTAM 3375 MG: 3; .375 INJECTION, POWDER, FOR SOLUTION INTRAVENOUS at 20:17

## 2023-03-03 RX ADMIN — IOPAMIDOL 100 ML: 755 INJECTION, SOLUTION INTRAVENOUS at 18:36

## 2023-03-03 RX ADMIN — SODIUM CHLORIDE, PRESERVATIVE FREE 40 MG: 5 INJECTION INTRAVENOUS at 19:30

## 2023-03-03 RX ADMIN — BENZOCAINE: 220 SPRAY, METERED PERIODONTAL at 20:18

## 2023-03-03 RX ADMIN — SODIUM CHLORIDE 1000 ML: 9 INJECTION, SOLUTION INTRAVENOUS at 18:48

## 2023-03-03 RX ADMIN — SODIUM CHLORIDE: 9 INJECTION, SOLUTION INTRAVENOUS at 23:55

## 2023-03-03 RX ADMIN — METOPROLOL TARTRATE 2.5 MG: 5 INJECTION, SOLUTION INTRAVENOUS at 23:59

## 2023-03-03 ASSESSMENT — ENCOUNTER SYMPTOMS
ABDOMINAL DISTENTION: 1
CHEST TIGHTNESS: 0
CONSTIPATION: 0
BLOATING: 1
ABDOMINAL PAIN: 1
VOMITING: 1
SORE THROAT: 0
SHORTNESS OF BREATH: 0
WHEEZING: 0
TROUBLE SWALLOWING: 0
ABDOMINAL PAIN: 1
BLOOD IN STOOL: 0
EYES NEGATIVE: 1
COUGH: 1
VOMITING: 0
NAUSEA: 0
SHORTNESS OF BREATH: 1
BACK PAIN: 1
BELCHING: 1
VOICE CHANGE: 0
DIARRHEA: 1
HEMATOCHEZIA: 0
BACK PAIN: 0
SINUS PRESSURE: 0
DIARRHEA: 1
SINUS PAIN: 0

## 2023-03-03 ASSESSMENT — LIFESTYLE VARIABLES
HOW MANY STANDARD DRINKS CONTAINING ALCOHOL DO YOU HAVE ON A TYPICAL DAY: PATIENT DOES NOT DRINK
HOW MANY STANDARD DRINKS CONTAINING ALCOHOL DO YOU HAVE ON A TYPICAL DAY: PATIENT DOES NOT DRINK
HOW OFTEN DO YOU HAVE A DRINK CONTAINING ALCOHOL: NEVER
HOW OFTEN DO YOU HAVE A DRINK CONTAINING ALCOHOL: NEVER

## 2023-03-03 ASSESSMENT — CROHNS DISEASE ACTIVITY INDEX (CDAI): CDAI SCORE: 0

## 2023-03-03 NOTE — ED TRIAGE NOTES
Diarrhea stools for 4 weeks, abdominal distention, gas, emesis. Feeling very fatigued. Sent from [facility] for further evaluation. BP was 80/64 and skin color pale when in UC.

## 2023-03-03 NOTE — ED PROVIDER NOTES
[id number] [facility]  eMERGENCY dEPARTMENT eNCOUnter      Pt Name: Mily Titus  MRN: [id number]  [place] 1945  Date of evaluation: 3/3/2023      CHIEF COMPLAINT       Chief Complaint   Patient presents with    Diarrhea    Fatigue    Abdominal Pain    Emesis         HISTORY OF PRESENT ILLNESS    Mily Titus is a 68 y.o. male who presents with complaints of increasing abdominal pain distention and watery diarrhea says been going on about for 5 weeks now he was seen a couple weeks ago had a work-up at that time which was negative including a CAT scan which did not show anything obvious had a history of chronic back pain and chronic compression fractures. He said that he can really eat is very bland toast and anything else he started getting abdominal cramping and vomiting he has had no prior abdominal surgeries he tells me. He had no nausea vomiting  There is no chest pain  Patient went to urgent care he had a pressure in the 80s so they sent him over here for further evaluation  REVIEW OF SYSTEMS         Review of Systems   Constitutional:  Positive for activity change, appetite change and fatigue. Negative for chills and fever. HENT:  Negative for congestion, dental problem, sore throat and trouble swallowing. Eyes:  Negative for visual disturbance. Respiratory:  Negative for shortness of breath and wheezing. Cardiovascular:  Negative for chest pain, palpitations and leg swelling. Gastrointestinal:  Positive for abdominal distention, abdominal pain and diarrhea. Negative for blood in stool, constipation, nausea and vomiting. Genitourinary:  Negative for difficulty urinating, dysuria and testicular pain. Musculoskeletal:  Negative for back pain, joint swelling and neck pain. Skin:  Negative for rash. Neurological:  Positive for light-headedness. Negative for dizziness, syncope, weakness and headaches. Hematological:  Negative for adenopathy. Does not bruise/bleed easily. Psychiatric/Behavioral:  Negative for confusion and suicidal ideas. PAST MEDICAL HISTORY    has a past medical history of Chronic bronchitis ([facility] [address] DJD (degenerative joint disease), GERD (gastroesophageal reflux disease), Hearing loss, Hyperlipidemia, Hypertension, Osteoarthritis of left knee, Overactive bladder, Prostate cancer ([facility] [address] Seasonal allergies, Spondylosis, and Type II or unspecified type diabetes mellitus without mention of complication, not stated as uncontrolled. SURGICAL HISTORY      has a past surgical history that includes Upper gastrointestinal endoscopy (03/15/2002); Colonoscopy (03/15/2002); Prostatectomy (02/09/2010); Total knee arthroplasty (Left, 04/23/2013); Tonsillectomy; Cardiac surgery; Coronary angioplasty with stent; other surgical history (08/25/2009); pr colon ca scrn not hi rsk ind (N/A, 2/27/2017); and Total knee arthroplasty (Right, 02/06/2018). CURRENT MEDICATIONS       Previous Medications    ATORVASTATIN (LIPITOR) 40 MG TABLET    take 1 tablet by mouth once daily    CELECOXIB (CELEBREX) 200 MG CAPSULE    Take 200 mg by mouth daily    CLOPIDOGREL (PLAVIX) 75 MG TABLET    take 1 tablet by mouth daily    CYCLOBENZAPRINE (FLEXERIL) 10 MG TABLET    Take 1 tablet by mouth 3 times daily as needed for Muscle spasms    DICLOFENAC SODIUM (VOLTAREN) 1 % GEL    Apply 4 g topically 4 times daily as needed for Pain    DICYCLOMINE (BENTYL) 10 MG CAPSULE    take 1 capsule by mouth four times a day if needed for BOWEL CRAMPING OR BLOATING    FEXOFENADINE (ALLEGRA) 180 MG TABLET    take 1 tablet by mouth once daily    HYDROCODONE-ACETAMINOPHEN (NORCO)  MG PER TABLET    Take 1 tablet by mouth every 6 hours as needed for Pain.     LEVALBUTEROL (XOPENEX HFA) 45 MCG/ACT INHALER    Inhale 1 puff into the lungs every 6 hours as needed for Wheezing or Shortness of Breath    MELOXICAM (MOBIC) 15 MG TABLET    Take 15 mg by mouth daily    METOPROLOL TARTRATE (LOPRESSOR) 50 MG TABLET take 1 tablet by mouth twice a day    NITROGLYCERIN (NITROSTAT) 0.4 MG SL TABLET    Place 1 tablet under the tongue every 5 minutes as needed for Chest pain    ONDANSETRON (ZOFRAN) 4 MG TABLET    take 1 tablet by mouth every 8 hours if needed for nausea and vomiting    PANTOPRAZOLE (PROTONIX) 40 MG TABLET    take 1 tablet by mouth once daily    TRIAMCINOLONE (KENALOG) 0.1 % CREAM    Apply topically 2 times daily. ALLERGIES     is allergic to zonisamide. FAMILY HISTORY     He indicated that his mother is . He indicated that his father is . He indicated that his maternal grandmother is . He indicated that his maternal grandfather is . He indicated that his paternal grandmother is . He indicated that his paternal grandfather is . family history includes Stroke in his father. SOCIAL HISTORY      reports that he quit smoking about 35 years ago. His smoking use included cigarettes. He has a 10.00 pack-year smoking history. He has never used smokeless tobacco. He reports that he does not drink alcohol and does not use drugs. PHYSICAL EXAM     INITIAL VITALS:  weight is 135 lb (61.2 kg). His tympanic temperature is 98.2 °F (36.8 °C). His blood pressure is 171/72 (abnormal) and his pulse is 59. His respiration is 16 and oxygen saturation is 98%. Physical Exam  Constitutional:       General: He is not in acute distress. Appearance: He is well-developed. He is not ill-appearing or toxic-appearing. HENT:      Head: Normocephalic and atraumatic. Right Ear: External ear normal.      Left Ear: External ear normal.   Eyes:      Pupils: Pupils are equal, round, and reactive to light. Cardiovascular:      Rate and Rhythm: Normal rate and regular rhythm. Pulmonary:      Effort: Pulmonary effort is normal.      Breath sounds: Normal breath sounds. Abdominal:      General: Bowel sounds are normal.      Palpations: Abdomen is soft.       Tenderness: There is generalized abdominal tenderness. There is no guarding or rebound. Negative signs include Brown's sign, Rovsing's sign, McBurney's sign and obturator sign. Musculoskeletal:         General: Normal range of motion. Cervical back: Normal range of motion and neck supple. Skin:     General: Skin is warm and dry. Neurological:      General: No focal deficit present. Mental Status: He is alert and oriented to person, place, and time. Psychiatric:         Behavior: Behavior normal.       DIFFERENTIAL DIAGNOSIS/ MDM:     Differential diagnosis considered: Colitis, diverticulitis, bowel obstruction, C. difficile although no recent antibiotics also to be a cardiac equivalent we will do EKG and check labs    Chronic Conditions affecting care (DM,HTN,CA, etc): None    Social Determinants of Health affecting care (unable to care for self, lives alone, unemployed, homeless,etc): Patient here with his wife    History source(s) (patient,spouse,parent,family,friend,EMS,etc): Patient and wife    Review of external sources (ECF,Hospital records,EMS report, radiology reports, etc): I reviewed his previous CAT scan    Tests considered but not ordered: Not applicable    Independent interpretation of tests (eg.  X-ray, CAT scan, Doppler studies, EKG): EKG shows no acute findings sinus bradycardia    Discussion of x-ray results with radiology: I discussed the case with the radiologist to inform me there was free air possibly from a gastric ulcer but not clearly    Consults:  The case was discussed with general surgery Dr. Chevy Faulkner who wanted an NG placed antibiotics and he will come in and evaluate the patient    Consideration for admission/observation (even if discharged):     Prescription considerations:     Sepsis considered: No evidence of sepsis    Critical Care note written: None    DIAGNOSTIC RESULTS     EKG: All EKG's are interpreted by the Emergency Department Physician who either signs or Co-signs this chart in the absence of a cardiologist.  Sinus bradycardia rate of 53 bpm parables 206 ms QRS durations 106 ms QT corrected 441 ms axis is 13 there is no acute ST or T wave changes seen      RADIOLOGY:   I directly visualized the following  images and reviewed the radiologist interpretations:       EXAMINATION:   CT OF THE ABDOMEN AND PELVIS WITH CONTRAST 3/3/2023 3:32 pm       TECHNIQUE:   CT of the abdomen and pelvis was performed with the administration of   intravenous contrast. Multiplanar reformatted images are provided for review. Automated exposure control, iterative reconstruction, and/or weight based   adjustment of the mA/kV was utilized to reduce the radiation dose to as low   as reasonably achievable. COMPARISON:   02/13/2023 and 03/03/2020. HISTORY:   ORDERING SYSTEM PROVIDED HISTORY: Abdominal pain diarrhea   TECHNOLOGIST PROVIDED HISTORY:       Abdominal pain diarrhea   Decision Support Exception - unselect if not a suspected or confirmed   emergency medical condition->Emergency Medical Condition (MA)   Reason for Exam: Posterior Abd pain and diarrhea       FINDINGS:   Lower Chest: Right lower lobe bronchiectasis and mild reticulonodular   opacities appear unchanged and most recent study and are improved from the   earlier comparison. Organs: Small cyst in the right hepatic lobe and spleen are unchanged. The   adrenal glands are unremarkable. The pancreas is atrophic and otherwise   unremarkable. A very small cyst at the upper pole the right kidney is   unchanged. The kidneys are otherwise unremarkable. GI/Bowel: There is no bowel dilatation or bowel wall thickening. The   appendix is not identified and may be absent. There is no enlarged appendix   and there are no inflammatory changes near the cecum. There are colonic   diverticula. The stomach is unremarkable. Pelvis: Bladder is nearly empty and otherwise unremarkable.   There has   probably been a prostatectomy. Peritoneum/Retroperitoneum: No evidence of lymphadenopathy. Aorta is normal   in caliber. There is free peritoneal air in the abdomen anteriorly and in   the lesser sac. There is air in close proximity to the gastric antrum. No   free fluid or focal fluid collection is noted. There is no fat stranding. Bones/Soft Tissues: No fracture or destructive bone lesion is identified. Mild height loss at T12 is unchanged. Impression   Free peritoneal air in the abdomen suggesting bowel perforation. The source   is uncertain, but there is air in close proximity the gastric antrum and a   perforated ulcer is a consideration. There are colonic diverticula, but   there are no acute findings in the vicinity of the colon. Critical results were called by Dr. Stan Ocasio to Dr. Dania Dickey on   3/3/2023 at 7:25 p.m. [facility]              ED BEDSIDE ULTRASOUND:       LABS:  Labs Reviewed   CBC WITH AUTO DIFFERENTIAL - Abnormal; Notable for the following components:       Result Value    MCHC 33.8 (*)     Lymphocytes 18 (*)     Eosinophils % 9 (*)     Immature Granulocytes 1 (*)     Absolute Lymph # 0.74 (*)     All other components within normal limits   COMPREHENSIVE METABOLIC PANEL W/ REFLEX TO MG FOR LOW K - Abnormal; Notable for the following components:    Glucose 120 (*)     Anion Gap 8 (*)     Alkaline Phosphatase 149 (*)     Total Protein 5.9 (*)     All other components within normal limits   COVID-19, RAPID   GASTROINTESTINAL PANEL, MOLECULAR   C DIFF TOXIN/ANTIGEN   LACTIC ACID   TROPONIN         EMERGENCY DEPARTMENT COURSE:   Vitals:    Vitals:    03/03/23 1728 03/03/23 1800 03/03/23 1940   BP: (!) 169/60 127/62 (!) 171/72   Pulse: 55 60 59   Resp: 16 16 16   Temp: 98.2 °F (36.8 °C)     TempSrc: Tympanic     SpO2: 96% 96% 98%   Weight: 135 lb (61.2 kg)       -------------------------  BP: (!) 171/72, Temp: 98.2 °F (36.8 °C), Heart Rate: 59, Resp: 16        Re-evaluation Notes        CRITICAL CARE:   IP CONSULT TO GENERAL SURGERY        CONSULTS:      PROCEDURES:  None    FINAL IMPRESSION      1. Pneumoperitoneum of unknown etiology          DISPOSITION/PLAN   DISPOSITION   Care of this patient is this past to the oncoming physician at the end of my shift 1930 hrs. Condition on Disposition    Stable    PATIENT REFERRED TO:  No follow-up provider specified. DISCHARGE MEDICATIONS:  New Prescriptions    No medications on file       (Please note that portions of this note were completed with a voice recognition program.  Efforts were made to edit the dictations but occasionally words are mis-transcribed.)    Belkys Alexander MD,, MD, F.A.A.E.M.   Attending Emergency Physician                           Belkys Alexander MD  03/03/23 Schuyler Tobin MD  03/03/23 1736

## 2023-03-03 NOTE — PROGRESS NOTES
[facility] A department of [facility]  Phone: [phone]  Fax: [phone]      Constance Soto is a 68 y.o. male who presents to the [facility] or [address] [place] [address] today for his medical conditions/complaints as noted below. Constance Soto is c/o of Diarrhea (Seen in Er 2/13/2023 diarrhea no better, stools are \"water\" on a mile diet.  )      Assessment and Plan     1. Diarrhea, unspecified type      2. Abdominal pain, unspecified abdominal location    Continues to have watery diarrhea daily for the past 4 weeks. Has had 10 stools today alone. Concern about low BP and fatigue. Patient was advised to go to the ER for further evaluation and possible IV fluids. Patient verbalized understanding and agreeable to the plan. ER was called and notified of patient's presentation and recommendations to go to ER for eval. Report was called to [facility]. Patient was taken to ER per Wheelchair by staff member. Wife accompanied patient. Subjective:         Diarrhea   This is a chronic problem. The current episode started more than 1 month ago. The problem occurs more than 10 times per day. The problem has been unchanged. The stool consistency is described as Watery. The patient states that diarrhea awakens him from sleep. Associated symptoms include abdominal pain and myalgias. Associated symptoms comments: Chronic low back pain  . Nothing aggravates the symptoms. Risk factors: Was seen and treated in ER on 2/13/23. Review of Systems   Gastrointestinal:  Positive for abdominal pain and diarrhea. Musculoskeletal:  Positive for myalgias.      Past Medical History:   Past Medical History:   Diagnosis Date    Chronic bronchitis (HCC)     DJD (degenerative joint disease)     GERD (gastroesophageal reflux disease)     Hearing loss     Mild    Hyperlipidemia     Hypertension     Osteoarthritis of left knee     Overactive bladder     Prostate cancer [facility])     Status post radical prostatectomy in 2009. Continues to follow with Dr. Len Huff every 6 months. Seasonal allergies     Spondylosis     Type II or unspecified type diabetes mellitus without mention of complication, not stated as uncontrolled     Diet controlled        Past Surgical History:  has a past surgical history that includes Upper gastrointestinal endoscopy (03/15/2002); Colonoscopy (03/15/2002); Prostatectomy (02/09/2010); Total knee arthroplasty (Left, 04/23/2013); Tonsillectomy; Cardiac surgery; Coronary angioplasty with stent; other surgical history (08/25/2009); pr colon ca scrn not hi rsk ind (N/A, 2/27/2017); and Total knee arthroplasty (Right, 02/06/2018). Allergies: Allergies   Allergen Reactions    Zonisamide Other (See Comments)     ANXIETY       Social History:  reports that he quit smoking about 35 years ago. His smoking use included cigarettes. He has a 10.00 pack-year smoking history. He has never used smokeless tobacco. He reports that he does not drink alcohol and does not use drugs. Objective:     Vitals:    03/03/23 1705   BP: 80/64   Site: Left Upper Arm   Position: Sitting   Cuff Size: Large Adult   Pulse: 54   Temp: 97.2 °F (36.2 °C)   TempSrc: Tympanic   SpO2: 98%   Weight: 135 lb 12.8 oz (61.6 kg)     Body mass index is 21.92 kg/m². BP 80/64 (Site: Left Upper Arm, Position: Sitting, Cuff Size: Large Adult)   Pulse 54   Temp 97.2 °F (36.2 °C) (Tympanic)   Wt 135 lb 12.8 oz (61.6 kg)   SpO2 98%   BMI 21.92 kg/m²   Physical Exam  Vitals and nursing note reviewed. Constitutional:       Appearance: He is ill-appearing. Neurological:      Mental Status: He is alert. Discussed exam, POCT findings, plan of care, and follow-up at length with patient and/or their caregiver. Reviewed all prescribed and recommended medications, administration and side effects.  Encouraged patient to follow up with PCP or return to the clinic for no improvement and or worsening of symptoms. All questions were addressed and answered with verbalization of understanding. The patient and/or the caregiver was agreeable with the plan.      Electronically signed by PAYTON Helm CNP on 3/3/2023 at 5:23 PM

## 2023-03-04 LAB
ABSOLUTE EOS #: 0.37 K/UL (ref 0–0.44)
ABSOLUTE IMMATURE GRANULOCYTE: <0.03 K/UL (ref 0–0.3)
ABSOLUTE LYMPH #: 0.89 K/UL (ref 1.1–3.7)
ABSOLUTE MONO #: 0.49 K/UL (ref 0.1–1.2)
ANION GAP SERPL CALCULATED.3IONS-SCNC: 9 MMOL/L (ref 9–17)
BASOPHILS # BLD: 1 % (ref 0–2)
BASOPHILS ABSOLUTE: 0.04 K/UL (ref 0–0.2)
BUN SERPL-MCNC: 8 MG/DL (ref 8–23)
BUN/CREAT BLD: 10 (ref 9–20)
C DIFF GDH + TOXINS A+B STL QL IA.RAPID: NEGATIVE
CALCIUM SERPL-MCNC: 8.6 MG/DL (ref 8.6–10.4)
CHLORIDE SERPL-SCNC: 105 MMOL/L (ref 98–107)
CO2 SERPL-SCNC: 25 MMOL/L (ref 20–31)
CREAT SERPL-MCNC: 0.82 MG/DL (ref 0.7–1.2)
EOSINOPHILS RELATIVE PERCENT: 8 % (ref 1–4)
GFR SERPL CREATININE-BSD FRML MDRD: >60 ML/MIN/1.73M2
GLUCOSE SERPL-MCNC: 103 MG/DL (ref 70–99)
HCT VFR BLD AUTO: 40.6 % (ref 40.7–50.3)
HGB BLD-MCNC: 13.8 G/DL (ref 13–17)
IMMATURE GRANULOCYTES: 0 %
LYMPHOCYTES # BLD: 18 % (ref 24–43)
MCH RBC QN AUTO: 28.7 PG (ref 25.2–33.5)
MCHC RBC AUTO-ENTMCNC: 34 G/DL (ref 25.2–33.5)
MCV RBC AUTO: 84.4 FL (ref 82.6–102.9)
MONOCYTES # BLD: 10 % (ref 3–12)
NRBC AUTOMATED: 0 PER 100 WBC
PDW BLD-RTO: 12.3 % (ref 11.8–14.4)
PLATELET # BLD AUTO: 212 K/UL (ref 138–453)
PMV BLD AUTO: 9.2 FL (ref 8.1–13.5)
POTASSIUM SERPL-SCNC: 3.7 MMOL/L (ref 3.7–5.3)
RBC # BLD: 4.81 M/UL (ref 4.21–5.77)
SEG NEUTROPHILS: 63 % (ref 36–65)
SEGMENTED NEUTROPHILS ABSOLUTE COUNT: 3.08 K/UL (ref 1.5–8.1)
SODIUM SERPL-SCNC: 139 MMOL/L (ref 135–144)
SPECIMEN DESCRIPTION: NORMAL
TROPONIN I SERPL DL<=0.01 NG/ML-MCNC: 9 NG/L (ref 0–22)
WBC # BLD AUTO: 4.9 K/UL (ref 3.5–11.3)

## 2023-03-04 PROCEDURE — 93005 ELECTROCARDIOGRAM TRACING: CPT | Performed by: NURSE PRACTITIONER

## 2023-03-04 PROCEDURE — 99222 1ST HOSP IP/OBS MODERATE 55: CPT | Performed by: FAMILY MEDICINE

## 2023-03-04 PROCEDURE — 85025 COMPLETE CBC W/AUTO DIFF WBC: CPT

## 2023-03-04 PROCEDURE — 36415 COLL VENOUS BLD VENIPUNCTURE: CPT

## 2023-03-04 PROCEDURE — 6360000002 HC RX W HCPCS: Performed by: NURSE PRACTITIONER

## 2023-03-04 PROCEDURE — 2580000003 HC RX 258: Performed by: NURSE PRACTITIONER

## 2023-03-04 PROCEDURE — 2060000000 HC ICU INTERMEDIATE R&B

## 2023-03-04 PROCEDURE — 6370000000 HC RX 637 (ALT 250 FOR IP): Performed by: NURSE PRACTITIONER

## 2023-03-04 PROCEDURE — 93005 ELECTROCARDIOGRAM TRACING: CPT | Performed by: SURGERY

## 2023-03-04 PROCEDURE — 2500000003 HC RX 250 WO HCPCS: Performed by: NURSE PRACTITIONER

## 2023-03-04 PROCEDURE — 84484 ASSAY OF TROPONIN QUANT: CPT

## 2023-03-04 PROCEDURE — C9113 INJ PANTOPRAZOLE SODIUM, VIA: HCPCS | Performed by: NURSE PRACTITIONER

## 2023-03-04 PROCEDURE — 80048 BASIC METABOLIC PNL TOTAL CA: CPT

## 2023-03-04 RX ADMIN — LEVALBUTEROL HYDROCHLORIDE 1.25 MG: 1.25 SOLUTION, CONCENTRATE RESPIRATORY (INHALATION) at 15:52

## 2023-03-04 RX ADMIN — HYDROMORPHONE HYDROCHLORIDE 1 MG: 1 INJECTION, SOLUTION INTRAMUSCULAR; INTRAVENOUS; SUBCUTANEOUS at 14:00

## 2023-03-04 RX ADMIN — PIPERACILLIN AND TAZOBACTAM 3375 MG: 3; .375 INJECTION, POWDER, FOR SOLUTION INTRAVENOUS at 04:13

## 2023-03-04 RX ADMIN — HYDROMORPHONE HYDROCHLORIDE 1 MG: 1 INJECTION, SOLUTION INTRAMUSCULAR; INTRAVENOUS; SUBCUTANEOUS at 17:58

## 2023-03-04 RX ADMIN — PIPERACILLIN AND TAZOBACTAM 3375 MG: 3; .375 INJECTION, POWDER, FOR SOLUTION INTRAVENOUS at 20:10

## 2023-03-04 RX ADMIN — HYDROMORPHONE HYDROCHLORIDE 1 MG: 1 INJECTION, SOLUTION INTRAMUSCULAR; INTRAVENOUS; SUBCUTANEOUS at 23:10

## 2023-03-04 RX ADMIN — HYDROMORPHONE HYDROCHLORIDE 1 MG: 1 INJECTION, SOLUTION INTRAMUSCULAR; INTRAVENOUS; SUBCUTANEOUS at 08:56

## 2023-03-04 RX ADMIN — SODIUM CHLORIDE, PRESERVATIVE FREE 40 MG: 5 INJECTION INTRAVENOUS at 08:56

## 2023-03-04 RX ADMIN — SODIUM CHLORIDE, PRESERVATIVE FREE 10 ML: 5 INJECTION INTRAVENOUS at 09:03

## 2023-03-04 RX ADMIN — ISODIUM CHLORIDE 3 ML: 0.03 SOLUTION RESPIRATORY (INHALATION) at 15:52

## 2023-03-04 RX ADMIN — PIPERACILLIN AND TAZOBACTAM 3375 MG: 3; .375 INJECTION, POWDER, FOR SOLUTION INTRAVENOUS at 12:03

## 2023-03-04 RX ADMIN — METOPROLOL TARTRATE 2.5 MG: 5 INJECTION, SOLUTION INTRAVENOUS at 17:58

## 2023-03-04 RX ADMIN — HYDROMORPHONE HYDROCHLORIDE 1 MG: 1 INJECTION, SOLUTION INTRAMUSCULAR; INTRAVENOUS; SUBCUTANEOUS at 00:05

## 2023-03-04 RX ADMIN — HYDROMORPHONE HYDROCHLORIDE 1 MG: 1 INJECTION, SOLUTION INTRAMUSCULAR; INTRAVENOUS; SUBCUTANEOUS at 12:00

## 2023-03-04 RX ADMIN — SODIUM CHLORIDE: 9 INJECTION, SOLUTION INTRAVENOUS at 22:42

## 2023-03-04 RX ADMIN — HYDROMORPHONE HYDROCHLORIDE 1 MG: 1 INJECTION, SOLUTION INTRAMUSCULAR; INTRAVENOUS; SUBCUTANEOUS at 04:06

## 2023-03-04 RX ADMIN — SODIUM CHLORIDE: 9 INJECTION, SOLUTION INTRAVENOUS at 10:45

## 2023-03-04 ASSESSMENT — PAIN SCALES - GENERAL
PAINLEVEL_OUTOF10: 3
PAINLEVEL_OUTOF10: 2
PAINLEVEL_OUTOF10: 8
PAINLEVEL_OUTOF10: 5
PAINLEVEL_OUTOF10: 6
PAINLEVEL_OUTOF10: 7
PAINLEVEL_OUTOF10: 8
PAINLEVEL_OUTOF10: 8
PAINLEVEL_OUTOF10: 9
PAINLEVEL_OUTOF10: 4
PAINLEVEL_OUTOF10: 7

## 2023-03-04 ASSESSMENT — PAIN DESCRIPTION - DESCRIPTORS
DESCRIPTORS: ACHING;SHOOTING
DESCRIPTORS: ACHING
DESCRIPTORS: ACHING;SHOOTING
DESCRIPTORS: ACHING;SHARP
DESCRIPTORS: ACHING;STABBING

## 2023-03-04 ASSESSMENT — PAIN - FUNCTIONAL ASSESSMENT
PAIN_FUNCTIONAL_ASSESSMENT: PREVENTS OR INTERFERES SOME ACTIVE ACTIVITIES AND ADLS
PAIN_FUNCTIONAL_ASSESSMENT: PREVENTS OR INTERFERES WITH MANY ACTIVE NOT PASSIVE ACTIVITIES

## 2023-03-04 ASSESSMENT — PAIN DESCRIPTION - LOCATION
LOCATION: BACK
LOCATION: BACK
LOCATION: BACK;LEG
LOCATION: BACK;LEG
LOCATION: BACK;NECK
LOCATION: ABDOMEN;ANKLE
LOCATION: BACK

## 2023-03-04 ASSESSMENT — PAIN DESCRIPTION - ORIENTATION
ORIENTATION: LEFT;LOWER
ORIENTATION: LOWER
ORIENTATION: LOWER
ORIENTATION: LEFT;LOWER
ORIENTATION: LEFT;RIGHT;LOWER
ORIENTATION: MID;LOWER
ORIENTATION: MID;UPPER

## 2023-03-04 ASSESSMENT — PAIN SCALES - WONG BAKER: WONGBAKER_NUMERICALRESPONSE: 2

## 2023-03-04 NOTE — H&P
HOSPITALIST ADMISSION H&P    REASON FOR ADMISSION:  pneumoperitoneum  ESTIMATED LENGTH OF STAY:>2 midnights, 3-4 days    ATTENDING/ADMITTING PHYSICIAN: Meet Godoy MD    HISTORY OF PRESENT ILLNESS:      The patient is a 68 y.o. male patient of Ildefonso Grace MD who presents from the ER (sent from Urgent Care) with c/o diarrhea, abdominal bloating, nausea, vomiting, acid reflux, and fatigue worsening over the past 4-5 weeks. His stools have been watery, with about 8 bowel movements per day. His abdominal pain began in the right upper abdomen and is now more epigastric to generalized. His symptoms are worse if he tries to eat and slightly better after he has a bowel movement. He has tried Bentyl, Norco, bland low fat diet, and antacids (rolaids, isadora seltzer, mylanta, protonix) with some temporary relief. He has underlying gerd, but reports he has been taking his protonix daily. He was seen in the ER on 02/13/2023 for his symptoms and CT of the abdomen was negative for acute process at that time. He denies previous abdominal surgery. Last colonoscopy was in 2017. He denies chest pain or fevers. In ER, CT of the abdomen impression: Free peritoneal air in the abdomen suggesting bowel perforation. The source is uncertain, but there is air in close proximity the gastric antrum and a perforated ulcer is a consideration. There are colonic diverticula, but there are no acute findings in the vicinity of the colon. WBC 4, lactic acid 1.4. EKG showed sinus bradycardia with NS ST wave abnormality, troponin 8. Dr. Jevon Gomes, general surgery, was consulted from ER and saw the patient in ER and requested hospitalization here. NG tube was placed in ER. Chronic pain syndrome -- back pain -- on long term Norco 10 mg, 5 tablets per day    Hypertension    CAD -- with stents in 2015 -- 2D echo from 2015 showed gr2DD, mild MR and TR. See below for additional PMH.     Patient [id number] records reviewed, including, but not limited to ER records, imaging results, lab results, office records, personal records, and OARRS -- no signs of abuse or diversion -- with overdose risk score of 330. Past Medical History:   Diagnosis Date    Bronchiectasis with acute lower respiratory infection ([facility] [address] 7/1/2020    Chronic bronchitis (HCC)     DJD (degenerative joint disease)     Enteritis 7/11/2015    GERD (gastroesophageal reflux disease)     Hearing loss     Mild    Hyperlipidemia     Hypertension     Osteoarthritis of left knee     Overactive bladder     Prostate cancer ([facility] [address]     Status post radical prostatectomy in 2009. Continues to follow with Dr. Zain Orellana every 6 months. Seasonal allergies     Spondylosis     Type II or unspecified type diabetes mellitus without mention of complication, not stated as uncontrolled     Diet controlled           Past Surgical History:   Procedure Laterality Date    CARDIAC SURGERY      COLONOSCOPY  03/15/2002    Dr. Kenneth Diamond      july 2015 drug-eluting stents placed 2 to the RCA and one to the left circumflex    OTHER SURGICAL HISTORY  08/25/2009    bilateral L3-4, L4-5, L5-S1 steroid facet injection     LA COLON CA SCRN NOT HI RSK IND N/A 2/27/2017    COLONOSCOPY performed by Mima Dominguez MD at [facility] OR  diverticulosis    PROSTATECTOMY  02/09/2010    Dr. Luiz Sahu Left 04/23/2013    TOTAL KNEE ARTHROPLASTY Right 02/06/2018    Dr. Jose Ortiz/ [facility], [facility], [address] [facility] ENDOSCOPY  03/15/2002    Dr. Queen Draper       Allergies:    Zonisamide    Social History:    reports that he quit smoking about 35 years ago. His smoking use included cigarettes. He has a 10.00 pack-year smoking history. He has never used smokeless tobacco. He reports that he does not drink alcohol and does not use drugs. Family History:   family history includes Stroke in his father.     REVIEW OF SYSTEMS:  See HPI and problem list; otherwise no other new complaints with respect to eyes, ENT, neck, pulmonary, coronary, chest, GI, , endocrine, musculoskeletal, hematologic, lymphatic, allergic/immunologic, neurologic, psychiatric, or skin. Code status: patient/family wishes for Full Code at this time.     PHYSICAL EXAM:  Vitals:  BP (!) 185/78 Comment: Patient just returned from restroom  Pulse 66   Temp 98.3 °F (36.8 °C) (Tympanic)   Resp 16   Ht 5' 6\" (1.676 m)   Wt 132 lb 6.4 oz (60.1 kg)   SpO2 97%   BMI 21.37 kg/m²     General: awake, alert, and cooperative  HEENT:  NG in place with bloody/pink tinged drainage in tube, PERRLA, Mucosa Pink, Moist, EOMI, External nose normal, Normocephalic, and Atraumatic  Neck: Supple, Carotid Pulses Present, No Masses, Tenderness, Nodularity, and No Lymphadenopathy  Chest/Lungs: Clear to Auscultation without Rales, Rhonchi, or Wheezes, Bases Diminished Bilaterally, and Respirations even and unlabored  Cardiac: Regular Rate and Rhythm and Pedal Pulses Palpable Bilaterally  GI/Abdomen: bowel sounds hypoactive, and Soft, no guarding, no rebound tenderness, c/o generalized tenderness to palpation, slightly distended  : Not examined  Extremities/Musculoskeletal: Generalized weakness  Skin: No Cyanosis and Skin warm and dry  Neuro: Alert and Oriented, to Person, to Time, to Place, to Situation, and No Localizing Signs/Symptoms  Psychiatric: Normal mood and affect      LABS:    CBC with Differential:    Lab Results   Component Value Date/Time    WBC 4.0 03/03/2023 05:58 PM    RBC 4.79 03/03/2023 05:58 PM    HGB 13.8 03/03/2023 05:58 PM    HCT 40.8 03/03/2023 05:58 PM     03/03/2023 05:58 PM    MCV 85.2 03/03/2023 05:58 PM    MCH 28.8 03/03/2023 05:58 PM    MCHC 33.8 03/03/2023 05:58 PM    RDW 12.4 03/03/2023 05:58 PM    LYMPHOPCT 18 03/03/2023 05:58 PM    MONOPCT 10 03/03/2023 05:58 PM    BASOPCT 1 03/03/2023 05:58 PM    MONOSABS 0.41 03/03/2023 05:58 PM LYMPHSABS 0.74 03/03/2023 05:58 PM    EOSABS 0.36 03/03/2023 05:58 PM    BASOSABS 0.03 03/03/2023 05:58 PM    DIFFTYPE NOT REPORTED 03/03/2020 07:08 PM     CMP:    Lab Results   Component Value Date/Time     03/03/2023 05:58 PM    K 4.4 03/03/2023 05:58 PM     03/03/2023 05:58 PM    CO2 28 03/03/2023 05:58 PM    BUN 11 03/03/2023 05:58 PM    CREATININE 0.85 03/03/2023 05:58 PM    GFRAA >60 07/07/2021 09:59 AM    LABGLOM >60 03/03/2023 05:58 PM    GLUCOSE 120 03/03/2023 05:58 PM    PROT 5.9 03/03/2023 05:58 PM    LABALBU 3.9 03/03/2023 05:58 PM    CALCIUM 9.0 03/03/2023 05:58 PM    BILITOT 0.6 03/03/2023 05:58 PM    ALKPHOS 149 03/03/2023 05:58 PM    AST 26 03/03/2023 05:58 PM    ALT 22 03/03/2023 05:58 PM       ASSESSMENT:      Patient Active Problem List   Diagnosis    Essential hypertension    Prostate cancer ([facility] [address]    Hyperlipidemia    CAD (coronary artery disease) SP drug eluting stent 2 stents to RCA and 1 to L circ     NSTEMI (non-ST elevated myocardial infarction) ([facility] [address]    Type 2 diabetes mellitus without complication ([facility] [address]    History of colon polyps    Chronic midline low back pain with bilateral sciatica    Pneumoperitoneum of unknown etiology    Adenocarcinoma of prostate (HCC)    Gastroesophageal reflux disease without esophagitis    Degeneration of intervertebral disc of lumbosacral region    Mechanical breakdown of urinary sphincter implant (HCC)    Neurogenic claudication (HCC)    Rotator cuff tear arthropathy of both shoulders    Spinal stenosis, lumbar region, with neurogenic claudication    Chronic diastolic congestive heart failure (HCC)       PLAN:    Medications:  Scheduled Meds:   pantoprazole (PROTONIX) 40 mg injection  40 mg IntraVENous Daily    sodium chloride flush  5-40 mL IntraVENous 2 times per day    piperacillin-tazobactam  3,375 mg IntraVENous Q8H     Continuous Infusions:   sodium chloride 100 mL/hr at 03/03/23 2355    sodium chloride       PRN Meds:benzocaine, sodium chloride flush, sodium chloride, ondansetron **OR** ondansetron, acetaminophen **OR** acetaminophen, HYDROmorphone, sodium chloride nebulizer, levalbuterol, sodium chloride nebulizer, metoprolol    PLAN:    1. Pneumoperitoneum -- NPO, maintain NG to ILWS per surgery, general surgery consult, IV Zosyn, IV protonix, pain control prn, C. Diff and stool cultures pending, C. Diff contact isolation, IV hydration  2. Hypertension -- prn IV lopressor and monitor  3. RT protocols with home xopenex  4. Home medications reviewed -- will resume when able to take PO  5. DVT prophylaxis -- SCDs  6. See orders     Note that over 55 minutes was spent in reviewing and obtaining history, physical examination and evaluation of the patient, placing orders, providing education, coordinating care, reviewing test results, documenting in the medical record, and discussion/communicating with patient/caregiver/family.     Electronically signed by PAYTON Stinson CNP, NP-C, FNP-BC on 3/4/2023 at 6:15 AM  Hospitalist/Nocturnist

## 2023-03-04 NOTE — PLAN OF CARE
Problem: ABCDS Injury Assessment  Goal: Absence of physical injury  Outcome: Progressing     Problem: Discharge Planning  Goal: Discharge to home or other facility with appropriate resources  Outcome: Progressing     Problem: Safety - Adult  Goal: Free from fall injury  Outcome: Progressing     Problem: Gastrointestinal - Adult  Goal: Minimal or absence of nausea and vomiting  Outcome: Progressing  Goal: Maintains or returns to baseline bowel function  Outcome: Progressing

## 2023-03-04 NOTE — CONSULTS
Diarrhea   This is a new problem. The current episode started 1 to 4 weeks ago. The problem occurs 5 to 10 times per day. The stool consistency is described as Watery. Associated symptoms include abdominal pain, bloating, coughing (chronic worse over past few months), vomiting and weight loss (10 lbs). Pertinent negatives include no chills, fever, headaches or myalgias. He has tried anti-motility drug for the symptoms. There is no history of irritable bowel syndrome. Abdominal Pain  This is a new problem. The current episode started 1 to 4 weeks ago (3 - 4 weeks). The pain is located in the generalized abdominal region. The quality of the pain is a sensation of fullness (Severe bloating). Associated symptoms include anorexia, belching, diarrhea, vomiting and weight loss (10 lbs). Pertinent negatives include no dysuria, fever, frequency, headaches, hematochezia, hematuria, myalgias or nausea. The pain is aggravated by eating. The pain is relieved by Passing flatus and bowel movements. He has tried antacids for the symptoms. The treatment provided moderate relief. Prior diagnostic workup includes CT scan. His past medical history is significant for GERD. There is no history of abdominal surgery, Crohn's disease, gallstones, irritable bowel syndrome, PUD or ulcerative colitis. Got relief with Bentyl. Only using Norco for back pain fairly regularly. Complaining of NG tube currently. His abdomen is not bothering him right now. Past Medical History:   Diagnosis Date    Chronic bronchitis (HCC)     DJD (degenerative joint disease)     GERD (gastroesophageal reflux disease)     Hearing loss     Mild    Hyperlipidemia     Hypertension     Osteoarthritis of left knee     Overactive bladder     Prostate cancer ([facility] [address]     Status post radical prostatectomy in 2009. Continues to follow with Dr. Martir Bartlett every 6 months.     Seasonal allergies     Spondylosis     Type II or unspecified type diabetes mellitus without mention of complication, not stated as uncontrolled     Diet controlled     Past Surgical History:   Procedure Laterality Date    CARDIAC SURGERY      COLONOSCOPY  03/15/2002    Dr. Barbara Owens      july 2015 drug-eluting stents placed 2 to the RCA and one to the left circumflex    OTHER SURGICAL HISTORY  08/25/2009    bilateral L3-4, L4-5, L5-S1 steroid facet injection     [place] COLON CA SCRN NOT HI RSK IND N/A 2/27/2017    COLONOSCOPY performed by Don Orozco MD at [facility] OR  diverticulosis    PROSTATECTOMY  02/09/2010    Dr. Adrian Sparks Left 04/23/2013    TOTAL KNEE ARTHROPLASTY Right 02/06/2018    Dr. Allen Ortiz/ [place], [place]    UPPER GASTROINTESTINAL ENDOSCOPY  03/15/2002    Dr. Jevon Gomes     Current Facility-Administered Medications   Medication Dose Route Frequency Provider Last Rate Last Admin    pantoprazole (PROTONIX) 40 mg in sodium chloride (PF) 0.9 % 10 mL injection  40 mg IntraVENous Daily Dang Gutierrez MD   40 mg at 03/03/23 1930    benzocaine (HURRICAINE) 20 % oral spray   Mouth/Throat 4x Daily Bryan Ip, DO   Given at 03/03/23 2018     Current Outpatient Medications   Medication Sig Dispense Refill    clopidogrel (PLAVIX) 75 MG tablet take 1 tablet by mouth daily 90 tablet 3    fexofenadine (ALLEGRA) 180 MG tablet take 1 tablet by mouth once daily 90 tablet 3    dicyclomine (BENTYL) 10 MG capsule take 1 capsule by mouth four times a day if needed for BOWEL CRAMPING OR BLOATING 30 capsule 2    metoprolol tartrate (LOPRESSOR) 50 MG tablet take 1 tablet by mouth twice a day 180 tablet 3    celecoxib (CELEBREX) 200 MG capsule Take 200 mg by mouth daily      cyclobenzaprine (FLEXERIL) 10 MG tablet Take 1 tablet by mouth 3 times daily as needed for Muscle spasms 21 tablet 0    levalbuterol (XOPENEX HFA) 45 MCG/ACT inhaler Inhale 1 puff into the lungs every 6 hours as needed for Wheezing or Shortness of Breath 1 each 5    meloxicam (MOBIC) 15 MG tablet Take 15 mg by mouth daily      pantoprazole (PROTONIX) 40 MG tablet take 1 tablet by mouth once daily 90 tablet 3    atorvastatin (LIPITOR) 40 MG tablet take 1 tablet by mouth once daily 90 tablet 3    ondansetron (ZOFRAN) 4 MG tablet take 1 tablet by mouth every 8 hours if needed for nausea and vomiting 30 tablet 0    diclofenac sodium (VOLTAREN) 1 % GEL Apply 4 g topically 4 times daily as needed for Pain 150 g 2    HYDROcodone-acetaminophen (NORCO)  MG per tablet Take 1 tablet by mouth every 6 hours as needed for Pain.      triamcinolone (KENALOG) 0.1 % cream Apply topically 2 times daily. 45 g 3    nitroGLYCERIN (NITROSTAT) 0.4 MG SL tablet Place 1 tablet under the tongue every 5 minutes as needed for Chest pain 25 tablet 3     Allergies   Allergen Reactions    Zonisamide Other (See Comments)     ANXIETY     Social History     Tobacco Use    Smoking status: Former     Packs/day: 0.50     Years: 20.00     Pack years: 10.00     Types: Cigarettes     Quit date: 1987     Years since quittin.8    Smokeless tobacco: Never    Tobacco comments:     Quit 30 years ago. [facility]   Substance Use Topics    Alcohol use: No     Alcohol/week: 0.0 standard drinks    Drug use: No     Family History   Problem Relation Age of Onset    Stroke Father      Review of Systems   Constitutional:  Positive for appetite change, fatigue, unexpected weight change and weight loss (10 lbs). Negative for chills and fever. HENT:  Positive for ear pain and hearing loss. Negative for dental problem, sinus pressure, sinus pain, sore throat, trouble swallowing and voice change. Eyes: Negative. Respiratory:  Positive for cough (chronic worse over past few months) and shortness of breath. Negative for chest tightness. Cardiovascular:  Negative for chest pain and palpitations.    Gastrointestinal:  Positive for abdominal distention, abdominal pain, anorexia, bloating, diarrhea and vomiting. Negative for hematochezia and nausea. Genitourinary:  Negative for difficulty urinating, dysuria, enuresis, frequency and hematuria. Musculoskeletal:  Positive for back pain (chronic). Negative for myalgias. Skin:  Negative for rash and wound. Itching without a rash   Neurological:  Negative for tremors, seizures, syncope, speech difficulty, weakness, light-headedness, numbness and headaches. Hematological:  Bruises/bleeds easily. Psychiatric/Behavioral:  Negative for agitation, confusion, decreased concentration and dysphoric mood. The patient is not nervous/anxious. BP (!) 168/78   Pulse 65   Temp 98.2 °F (36.8 °C) (Tympanic)   Resp 16   Ht 5' 6\" (1.676 m)   Wt 135 lb (61.2 kg)   SpO2 95%   BMI 21.79 kg/m²     Physical Exam  Constitutional:       General: He is in acute distress (mild). Appearance: Normal appearance. He is normal weight. He is not ill-appearing, toxic-appearing or diaphoretic. HENT:      Head: Normocephalic and atraumatic. No right periorbital erythema or left periorbital erythema. Comments: NG tube in place     Mouth/Throat:      Lips: Pink. No lesions. Pharynx: Oropharynx is clear. Uvula midline. No uvula swelling. Eyes:      General: No scleral icterus. Extraocular Movements: Extraocular movements intact. Conjunctiva/sclera: Conjunctivae normal.      Pupils: Pupils are equal, round, and reactive to light. Cardiovascular:      Rate and Rhythm: Normal rate and regular rhythm. Pulses:           Carotid pulses are 2+ on the right side and 2+ on the left side. Radial pulses are 2+ on the right side and 2+ on the left side. Popliteal pulses are 2+ on the right side and 2+ on the left side. Heart sounds: Normal heart sounds. Pulmonary:      Effort: Pulmonary effort is normal. No tachypnea or bradypnea. Breath sounds: Decreased air movement present.    Chest:      Comments: Asymmetrical chest wall  Abdominal:      General: Abdomen is flat. Bowel sounds are normal. There is no distension or abdominal bruit.      Palpations: Abdomen is soft. There is no hepatomegaly or splenomegaly.      Tenderness: There is no abdominal tenderness. There is no guarding or rebound.      Hernia: No hernia is present. There is no hernia in the umbilical area, ventral area, left inguinal area or right inguinal area.   Musculoskeletal:         General: Deformity present.   Lymphadenopathy:      Upper Body:      Right upper body: No supraclavicular or axillary adenopathy.      Left upper body: No supraclavicular or axillary adenopathy.      Lower Body: No right inguinal adenopathy. No left inguinal adenopathy.   Skin:     General: Skin is warm and dry.      Coloration: Skin is not jaundiced.      Findings: No bruising.   Neurological:      General: No focal deficit present.      Mental Status: He is alert and oriented to person, place, and time.   Psychiatric:         Mood and Affect: Mood normal.         Behavior: Behavior normal.      Chest xray and CT scan reports and images reviewed.    IMP/PLAN  1) Perforated viscus - likely a perforated ulcer  - no peritonitis  - The up right chest xray does not show free air, there is only a small amount on on the CT.  No free fluid or focal inflammation  - I suspect a perforated antral ulcer based on the CT and his symptoms  - Try managing non-operatively  - As he chronically uses narcotics it could cover his symptoms  - He does not use NSAIDs  2) Bloating and diarrhea x 3 - 4 weeks - bloating could be due to gastric outlet obstruction and diarrhea associate with po antacid use.  3) Assuming he does not deteriorate, consider EGD in 4 weeks.

## 2023-03-04 NOTE — PROGRESS NOTES
S. Patient complaining of back pain radiating to his thighs, and chest pain (pressure) across his lower chest.  NG bothers him too.   O. BP (!) 183/73   Pulse 68   Temp 97.4 °F (36.3 °C) (Tympanic)   Resp 18   Ht 5' 6\" (1.676 m)   Wt 132 lb 6.4 oz (60.1 kg)   SpO2 95%   BMI 21.37 kg/m²     Intake/Output Summary (Last 24 hours) at 3/4/2023 1327  Last data filed at 3/4/2023 0900  Gross per 24 hour   Intake 1702.15 ml   Output 300 ml   Net 1402.15 ml     CBC with Differential:    Lab Results   Component Value Date/Time    WBC 4.9 03/04/2023 05:50 AM    RBC 4.81 03/04/2023 05:50 AM    HGB 13.8 03/04/2023 05:50 AM    HCT 40.6 03/04/2023 05:50 AM     03/04/2023 05:50 AM    MCV 84.4 03/04/2023 05:50 AM    MCH 28.7 03/04/2023 05:50 AM    MCHC 34.0 03/04/2023 05:50 AM    RDW 12.3 03/04/2023 05:50 AM    LYMPHOPCT 18 03/04/2023 05:50 AM    MONOPCT 10 03/04/2023 05:50 AM    BASOPCT 1 03/04/2023 05:50 AM    MONOSABS 0.49 03/04/2023 05:50 AM    LYMPHSABS 0.89 03/04/2023 05:50 AM    EOSABS 0.37 03/04/2023 05:50 AM    BASOSABS 0.04 03/04/2023 05:50 AM    DIFFTYPE NOT REPORTED 03/03/2020 07:08 PM     CMP:    Lab Results   Component Value Date/Time     03/04/2023 05:50 AM    K 3.7 03/04/2023 05:50 AM     03/04/2023 05:50 AM    CO2 25 03/04/2023 05:50 AM    BUN 8 03/04/2023 05:50 AM    CREATININE 0.82 03/04/2023 05:50 AM    GFRAA >60 07/07/2021 09:59 AM    LABGLOM >60 03/04/2023 05:50 AM    GLUCOSE 103 03/04/2023 05:50 AM    PROT 5.9 03/03/2023 05:58 PM    LABALBU 3.9 03/03/2023 05:58 PM    CALCIUM 8.6 03/04/2023 05:50 AM    BILITOT 0.6 03/03/2023 05:58 PM    ALKPHOS 149 03/03/2023 05:58 PM    AST 26 03/03/2023 05:58 PM    ALT 22 03/03/2023 05:58 PM     BMP:    Lab Results   Component Value Date/Time     03/04/2023 05:50 AM    K 3.7 03/04/2023 05:50 AM     03/04/2023 05:50 AM    CO2 25 03/04/2023 05:50 AM    BUN 8 03/04/2023 05:50 AM    LABALBU 3.9 03/03/2023 05:58 PM    CREATININE 0.82 03/04/2023 05:50 AM    CALCIUM 8.6 03/04/2023 05:50 AM    GFRAA >60 07/07/2021 09:59 AM    LABGLOM >60 03/04/2023 05:50 AM    GLUCOSE 103 03/04/2023 05:50 AM        Lungs - clear  Heart - RRR  Abdomen - Soft, complete non-tender with active bowel    NG drainage is pretty clear    IMP/PLAN   1) Perforated ulcer -  almost no symptoms attributable to perforation  2) D/C NG but keep him NPO  3) Chest pain - EKG and notify hospitalist.

## 2023-03-04 NOTE — PROGRESS NOTES
[facility]uan Mccoy, [facility]. Pneumoperitoneum of unknown etiology [K66.8] . Vitals:    03/04/23 0624   BP: (!) 153/81   Pulse: 66   Resp: 16   Temp: 98.1 °F (36.7 °C)   SpO2: 94%   . Patients home meds are   Prior to Admission medications    Medication Sig Start Date End Date Taking? Authorizing Provider   clopidogrel (PLAVIX) 75 MG tablet take 1 tablet by mouth daily 2/23/23   Roberto Desai MD   fexofenadine [facility]) 180 MG tablet take 1 tablet by mouth once daily  Patient not taking: Reported on 3/3/2023 12/27/22   Ildefonso Grace MD   dicyclomine (BENTYL) 10 MG capsule take 1 capsule by mouth four times a day if needed for BOWEL CRAMPING OR BLOATING 12/13/22   Ildefonso Grace MD   metoprolol tartrate (LOPRESSOR) 50 MG tablet take 1 tablet by mouth twice a day 10/31/22   Ildefonso Grace MD   cyclobenzaprine (FLEXERIL) 10 MG tablet Take 1 tablet by mouth 3 times daily as needed for Muscle spasms 8/18/22   Ildefonso Grace MD   levalbuterol Ladell Diones HFA) 45 MCG/ACT inhaler Inhale 1 puff into the lungs every 6 hours as needed for Wheezing or Shortness of Breath 8/18/22 8/18/23  Jules Jacome MD   pantoprazole (PROTONIX) 40 MG tablet take 1 tablet by mouth once daily 6/3/22   Ildefonso Grace MD   atorvastatin (LIPITOR) 40 MG tablet take 1 tablet by mouth once daily 5/10/22   Ildefonso Grace MD   ondansetron (ZOFRAN) 4 MG tablet take 1 tablet by mouth every 8 hours if needed for nausea and vomiting 3/22/22   PAYTON Schmitz - CNP   diclofenac sodium (VOLTAREN) 1 % GEL Apply 4 g topically 4 times daily as needed for Pain 11/22/21   Naga Torres DO   HYDROcodone-acetaminophen (NORCO)  MG per tablet Take 1 tablet by mouth every 6 hours as needed for Pain. Historical Provider, MD   triamcinolone (KENALOG) 0.1 % cream Apply topically 2 times daily.  5/15/20   Ildefonso Grace MD   nitroGLYCERIN (NITROSTAT) 0.4 MG SL tablet Place 1 tablet under the tongue every 5 minutes as needed for Chest pain 5/15/20   Sean Mckoy MD   .  Recent Surgical History: None = 0     Assessment     Peak Flow (asthma only)    Predicted: 429  Personal Best: 129    % Predicted 32  Peak Flow : Less than 50% = 4    FEV1/FVC    FEV1 Predicted 2.64      FEV1 1.65    FEV1 % Predicted 67  FVC 2.56  IS volume 1500  IBW 63.8    RR 14  Breath Sounds: clear       Bronchodilator assessment at level  0-1  Hyperinflation assessment at level 1  Secretion Management assessment at level  1    [x]    Bronchodilator Assessment  BRONCHODILATOR ASSESSMENT SCORE  Score 0 1 2 3 4 5   Breath Sounds   [x]  Patient Baseline []  No Wheeze good aeration []  Faint, scattered wheezing, good aeration []  Expiratory Wheezing and or moderately diminished []  Insp/Exp wheeze and/or very diminished []  Insp/Exp and/ or marked distress   Respiratory Rate   []  Patient Baseline [x]  Less than 20 []  Less than 20 []  20-25 []  Greater than 25 []  Greater than 25   Peak flow % of Pred or PB []  NA   []  Greater than 90%  []  81-90% []  71-80% [x][]  Less than or equal to 70%  or unable to perform   Unable due to Respiratory Distress   Dyspnea re [x]  Patient Baseline []  No SOB []  No SOB []  SOB on exertion []  SOB min activity []  At rest/acute   e FEV% Predicted       []  NA []  Above 69%  []  Unable []  Above 60-69%  []  Unable []  Above 50-59%  []  Unable []  Above 35-49%  []  Unable [x]  Less than 35%  []  Unable                 [x]  Hyperinflation Assessment  Score 1 2 3   CXR and Breath Sounds   [x]  Clear []  No atelectasis  Basilar aeration []  Atelectasis or absent basilar breath sounds   Incentive Spirometry Volume  (Per IBW)   [x]  Greater than or equal to 15ml/Kg []  less than 15ml/Kg []  less than 15ml/Kg   Surgery within last 2 weeks [x]  None or general   []  Abdominal or thoracic surgery  []  Abdominal or thoracic   Chronic Pulmonary Historyre [x]  No []  Yes []  Yes     [x]  Secretion Management Assessment  Score 1 2 3   Bilateral Breath Sounds   [x]  Occasional Rhonchi []  Scattered Rhonchi []  Course Rhonchi and/or poor aeration   Sputum    [x]  Small amount of thin secretions []  Moderate amount of viscous secretions []  Copius, Viscious Yellow/ Secretions   CXR as reported by physician [x]  clear  []  Unavailable []  Infiltrates and/or consolidation  []  Unavailable []  Mucus Plugging and or lobar consolidation  []  Unavailable   Cough [x]  Strong, productive cough []  Weak productive cough []  No cough or weak non-productive cough   Milady Norwood, LILLIE  6:45 AM                            FEMALE                                  MALE                            FEV1 Predicted Normal Values                        FEV1 Predicted Normal Values          Age                                     Height in Feet and Inches       Age                                     Height in Feet and Inches       4' 11\" 5' 1\" 5' 3\" 5' 5\" 5' 7\" 5' 9\" 5' 11\" 6' 1\"  4' 11\" 5' 1\" 5' 3\" 5' 5\" 5' 7\" 5' 9\" 5' 11\" 6' 1\"   42 - 45 2.49 2.66 2.84 3.03 3.22 3.42 3.62 3.83 42 - 45 2.82 3.03 3.26 3.49 3.72 3.96 4.22 4.47   46 - 49 2.40 2.57 2.76 2.94 3.14 3.33 3.54 3.75 46 - 49 2.70 2.92 3.14 3.37 3.61 3.85 4.10 4.36   50 - 53 2.31 2.48 2.66 2.85 3.04 3.24 3.45 3.66 50 - 53 2.58 2.80 3.02 3.25 3.49 3.73 3.98 4.24   54 - 57 2.21 2.38 2.57 2.75 2.95 3.14 3.35 3.56 54 - 57 2.46 2.67 2.89 3.12 3.36 3.60 3.85 4.11   58 - 61 2.10 2.28 2.46 2.65 2.84 3.04 3.24 3.45 58 - 61 2.32 2.54 2.76 2.99 3.23 3.47 3.72 3.98   62 - 65 1.99 2.17 2.35 2.54 2.73 2.93 3.13 3.34 62 - 65 2.19 2.40 2.62 2.85 3.09 3.33 3.58 3.84   66 - 69 1.88 2.05 2.23 2.42 2.61 2.81 3.02 3.23 66 - 69 2.04 2.26 2.48 2.71 2.95 3.19 3.44 3.70   70+ 1.82 1.99 2.17 2.36 2.55 2.75 2.95 3.16 70+ 1.97 2.19 2.41 2.64 2.87 3.12 3.37 3.62             Predicted Peak Expiratory Flow Rate                                       Height (in)  Female       Height (in) Male           Age 56 58 60 62 64  66 68 70 Age            20 344 357 372 387 402 417 432 446  60 62 64 66 68 70 72 74 76   25 337 352 366 381 396 411 426 441 25 447 476 505 533 562 [phone]   30 329 344 359 374 389 404 419 434 30 437 466 494 523 552 [phone] 667   35 322 337 351 366 381 396 411 426 35 426 455 484 512 541 570 [phone]   40 314 329 344 359 374 389 404 419 40 416 445 473 [phone] 617 647   45 307 322 336 351 366 381 396 411 45 405 434 463 491 520 549 577 606 636   50 299 314 329 344 359 374 389 404 50 395 424 452 481 510 538 567 596 625   55 292 307 321 336 351 366 381 396 55 384 413 442 470 499 528 556 585 615   60 284 299 314 329 344 359 374 389 60 374 403 431 460 489 517 546 575 605   65 277 292 306 321 336 351 366 381 65 363 392 421 449 478 507 535 564 594   70 269 284 299 314 329 344 359 374 70 353 382 410 439 468 496 525 554 583   75 261 274 289 305 319 334 348 364 75 344 372 400 429 458 487 515 544 573   80 253 266 282 296 312 327 342 356 80 335 362 390 419 448 476 [phone]

## 2023-03-04 NOTE — ED PROVIDER NOTES
[address]  Phone: [phone]        ADDENDUM:      Care of this patient was assumed from Dr. Sujey Hillman. The patient was seen for Diarrhea, Fatigue, Abdominal Pain, and Emesis  . The patient's initial evaluation and plan have been discussed with the prior provider who initially evaluated the patient. Nursing Notes, Past Medical Hx, Past Surgical Hx, Allergies, were all reviewed. PAST MEDICAL HISTORY    has a past medical history of Bronchiectasis with acute lower respiratory infection ([address] Chronic bronchitis ([address] DJD (degenerative joint disease), Enteritis, GERD (gastroesophageal reflux disease), Hearing loss, Hyperlipidemia, Hypertension, Osteoarthritis of left knee, Overactive bladder, Prostate cancer ([address] Seasonal allergies, Spondylosis, and Type II or unspecified type diabetes mellitus without mention of complication, not stated as uncontrolled. SURGICAL HISTORY      has a past surgical history that includes Upper gastrointestinal endoscopy (03/15/2002); Colonoscopy (03/15/2002); Prostatectomy (02/09/2010); Total knee arthroplasty (Left, 04/23/2013); Tonsillectomy; Cardiac surgery; Coronary angioplasty with stent; other surgical history (08/25/2009); pr colon ca scrn not hi rsk ind (N/A, 2/27/2017); and Total knee arthroplasty (Right, 02/06/2018).     CURRENT MEDICATIONS       Current Discharge Medication List        CONTINUE these medications which have NOT CHANGED    Details   clopidogrel (PLAVIX) 75 MG tablet take 1 tablet by mouth daily  Qty: 90 tablet, Refills: 3      fexofenadine (ALLEGRA) 180 MG tablet take 1 tablet by mouth once daily  Qty: 90 tablet, Refills: 3    Associated Diagnoses: Environmental allergies      dicyclomine (BENTYL) 10 MG capsule take 1 capsule by mouth four times a day if needed for BOWEL CRAMPING OR BLOATING  Qty: 30 capsule, Refills: 2    Associated Diagnoses: Nausea      metoprolol tartrate (LOPRESSOR) 50 MG tablet take 1 tablet by mouth twice a day  Qty: 180 tablet, Refills: 3    Associated Diagnoses: Essential hypertension      cyclobenzaprine (FLEXERIL) 10 MG tablet Take 1 tablet by mouth 3 times daily as needed for Muscle spasms  Qty: 21 tablet, Refills: 0      levalbuterol (XOPENEX HFA) 45 MCG/ACT inhaler Inhale 1 puff into the lungs every 6 hours as needed for Wheezing or Shortness of Breath  Qty: 1 each, Refills: 5    Associated Diagnoses: Bronchiectasis without complication (HCC)      pantoprazole (PROTONIX) 40 MG tablet take 1 tablet by mouth once daily  Qty: 90 tablet, Refills: 3      atorvastatin (LIPITOR) 40 MG tablet take 1 tablet by mouth once daily  Qty: 90 tablet, Refills: 3    Associated Diagnoses: Other hyperlipidemia      ondansetron (ZOFRAN) 4 MG tablet take 1 tablet by mouth every 8 hours if needed for nausea and vomiting  Qty: 30 tablet, Refills: 0    Associated Diagnoses: Nausea      diclofenac sodium (VOLTAREN) 1 % GEL Apply 4 g topically 4 times daily as needed for Pain  Qty: 150 g, Refills: 2      HYDROcodone-acetaminophen (NORCO)  MG per tablet Take 1 tablet by mouth every 6 hours as needed for Pain.      triamcinolone (KENALOG) 0.1 % cream Apply topically 2 times daily. Qty: 45 g, Refills: 3    Associated Diagnoses: Rash      nitroGLYCERIN (NITROSTAT) 0.4 MG SL tablet Place 1 tablet under the tongue every 5 minutes as needed for Chest pain  Qty: 25 tablet, Refills: 3    Associated Diagnoses: Coronary artery disease involving native coronary artery of native heart without angina pectoris             ALLERGIES     is allergic to zonisamide. Diagnostic Results     LABS:   Results for orders placed or performed during the hospital encounter of 03/03/23   COVID-19, Rapid    Specimen: Nasopharyngeal Swab   Result Value Ref Range    Specimen Description . NASOPHARYNGEAL SWAB     SARS-CoV-2, Rapid Not Detected Not Detected   CBC with Auto Differential   Result Value Ref Range    WBC 4.0 3.5 - 11.3 k/uL    RBC 4.79 4.21 - 5.77 m/uL    Hemoglobin 13.8 13.0 - 17.0 g/dL    Hematocrit 40.8 40.7 - 50.3 %    MCV 85.2 82.6 - 102.9 fL    MCH 28.8 25.2 - 33.5 pg    MCHC 33.8 (H) 25.2 - 33.5 g/dL    RDW 12.4 11.8 - 14.4 %    Platelets 367 189 - 659 k/uL    MPV 9.2 8.1 - 13.5 fL    NRBC Automated 0.0 0.0 per 100 WBC    Seg Neutrophils 61 36 - 65 %    Lymphocytes 18 (L) 24 - 43 %    Monocytes 10 3 - 12 %    Eosinophils % 9 (H) 1 - 4 %    Basophils 1 0 - 2 %    Immature Granulocytes 1 (H) 0 %    Segs Absolute 2.46 1.50 - 8.10 k/uL    Absolute Lymph # 0.74 (L) 1.10 - 3.70 k/uL    Absolute Mono # 0.41 0.10 - 1.20 k/uL    Absolute Eos # 0.36 0.00 - 0.44 k/uL    Basophils Absolute 0.03 0.00 - 0.20 k/uL    Absolute Immature Granulocyte <0.03 0.00 - 0.30 k/uL   Comprehensive Metabolic Panel w/ Reflex to MG   Result Value Ref Range    Glucose 120 (H) 70 - 99 mg/dL    BUN 11 8 - 23 mg/dL    Creatinine 0.85 0.70 - 1.20 mg/dL    Est, Glom Filt Rate >60 >60 mL/min/1.73m2    Bun/Cre Ratio 13 9 - 20    Calcium 9.0 8.6 - 10.4 mg/dL    Sodium 138 135 - 144 mmol/L    Potassium 4.4 3.7 - 5.3 mmol/L    Chloride 102 98 - 107 mmol/L    CO2 28 20 - 31 mmol/L    Anion Gap 8 (L) 9 - 17 mmol/L    Alkaline Phosphatase 149 (H) 40 - 129 U/L    ALT 22 5 - 41 U/L    AST 26 <40 U/L    Total Bilirubin 0.6 0.3 - 1.2 mg/dL    Total Protein 5.9 (L) 6.4 - 8.3 g/dL    Albumin 3.9 3.5 - 5.2 g/dL    Albumin/Globulin Ratio 2.0 1.0 - 2.5   Lactic Acid   Result Value Ref Range    Lactic Acid 1.4 0.5 - 2.2 mmol/L   Troponin   Result Value Ref Range    Troponin, High Sensitivity 8 0 - 22 ng/L   EKG 12 Lead   Result Value Ref Range    Ventricular Rate 53 BPM    Atrial Rate 53 BPM    P-R Interval 206 ms    QRS Duration 106 ms    Q-T Interval 470 ms    QTc Calculation (Bazett) 441 ms    P Axis 33 degrees    R Axis 13 degrees    T Axis -44 degrees       RADIOLOGY:  XR CHEST PORTABLE   Final Result   Enteric tube tip and side-port in the proximal stomach below the   gastroesophageal junction. CT ABDOMEN PELVIS W IV CONTRAST Additional Contrast? None   Final Result   Free peritoneal air in the abdomen suggesting bowel perforation. The source   is uncertain, but there is air in close proximity the gastric antrum and a   perforated ulcer is a consideration. There are colonic diverticula, but   there are no acute findings in the vicinity of the colon. Critical results were called by Dr. Tong Arnett to Dr. Doyle Magallon on   3/3/2023 at 7:25 p.m. [facility]              RECENT VITALS:  BP: (!) 162/77, Temp: 98.5 °F (36.9 °C), Heart Rate: 64, Resp: 16     ED Course     The patient was given the following medications:  Orders Placed This Encounter   Medications    0.9 % sodium chloride bolus    iopamidol (ISOVUE-370) 76 % injection 100 mL    pantoprazole (PROTONIX) 40 mg in sodium chloride (PF) 0.9 % 10 mL injection    piperacillin-tazobactam (ZOSYN) 3,375 mg in sodium chloride 0.9 % 50 mL IVPB (mini-bag)     Order Specific Question:   Antimicrobial Indications     Answer:   Intra-Abdominal Infection    benzocaine (HURRICAINE) 20 % oral spray    0.9 % sodium chloride infusion    sodium chloride flush 0.9 % injection 5-40 mL    sodium chloride flush 0.9 % injection 5-40 mL    0.9 % sodium chloride infusion    OR Linked Order Group     ondansetron (ZOFRAN-ODT) disintegrating tablet 4 mg     ondansetron (ZOFRAN) injection 4 mg    OR Linked Order Group     acetaminophen (TYLENOL) tablet 650 mg     acetaminophen (TYLENOL) suppository 650 mg    piperacillin-tazobactam (ZOSYN) 3,375 mg in sodium chloride 0.9 % 50 mL IVPB (mini-bag)     Order Specific Question:   Antimicrobial Indications     Answer:   Intra-Abdominal Infection    HYDROmorphone (DILAUDID) injection 1 mg    sodium chloride nebulizer 0.9 % solution 3 mL    levalbuterol (XOPENEX) nebulizer solution 1.25 mg     Order Specific Question:   Please select a reason the therapeutic interchange was not accepted: Answer:   Lack of Response to Alternative    sodium chloride nebulizer 0.9 % solution 3 mL    metoprolol (LOPRESSOR) injection 2.5 mg       Medical Decision Making           The patient is a 78-year-old male who presents for evaluation of diarrhea for approximately 3 to 4 weeks with associated intermittent abdominal pain and cramping and decreased appetite. He has only been eating bread secondary to exacerbation of pain with eating foods. Today he had worsening epigastric abdominal pain. Vital signs of been stable. He was found to have pneumoperitoneum on CT abdomen pelvis. CBC, CMP, lactic acid, and troponin are unremarkable. GI stool studies have been ordered including C. difficile. Rapid COVID is negative. Dr. Dania Dickey spoke with Dr. Ysabel Smith and Dr. Ysabel Smith plans on coming to the emergency department to evaluate the patient. He requested that the patient be started on IV fluids, IV antibiotics, and a NG tube be placed. NG tube was placed and chest x-ray confirms placement in the proximal stomach. At time of signout we are waiting for Dr. Ysabel Smith. Dr. Ysabel Smith evaluated the patient at 10 PM.  He agrees with current plan and would like the patient admitted to the hospitalist.  I spoke to the hospitalist NEHAL, Dany Meehan at 10:15 PM who agrees to admit the patient. Disposition     FINAL IMPRESSION      1. Pneumoperitoneum of unknown etiology          DISPOSITION/PLAN   DISPOSITION Admitted 03/03/2023 11:06:32 PM      CONDITION ON DISPOSITION:   Stable    PATIENT REFERRED TO:  No follow-up provider specified.     DISCHARGE MEDICATIONS:  Current Discharge Medication List                (Please note that portions of this note were completed with a voice recognition program.  Efforts were made to edit the dictations but occasionally words are mis-transcribed.)    Yue Monreal DO  Emergency Medicine Physician                  Yue Monreal DO  03/04/23 0359

## 2023-03-05 VITALS
HEIGHT: 66 IN | TEMPERATURE: 98.3 F | SYSTOLIC BLOOD PRESSURE: 162 MMHG | DIASTOLIC BLOOD PRESSURE: 72 MMHG | RESPIRATION RATE: 18 BRPM | OXYGEN SATURATION: 96 % | WEIGHT: 132.3 LBS | HEART RATE: 72 BPM | BODY MASS INDEX: 21.26 KG/M2

## 2023-03-05 LAB
ABSOLUTE EOS #: 0.18 K/UL (ref 0–0.44)
ABSOLUTE IMMATURE GRANULOCYTE: <0.03 K/UL (ref 0–0.3)
ABSOLUTE LYMPH #: 1.12 K/UL (ref 1.1–3.7)
ABSOLUTE MONO #: 0.73 K/UL (ref 0.1–1.2)
ANION GAP SERPL CALCULATED.3IONS-SCNC: 13 MMOL/L (ref 9–17)
BASOPHILS # BLD: 1 % (ref 0–2)
BASOPHILS ABSOLUTE: 0.03 K/UL (ref 0–0.2)
BUN SERPL-MCNC: 9 MG/DL (ref 8–23)
BUN/CREAT BLD: 12 (ref 9–20)
CALCIUM SERPL-MCNC: 8.7 MG/DL (ref 8.6–10.4)
CAMPYLOBACTER DNA SPEC NAA+PROBE: NORMAL
CHLORIDE SERPL-SCNC: 103 MMOL/L (ref 98–107)
CO2 SERPL-SCNC: 22 MMOL/L (ref 20–31)
CREAT SERPL-MCNC: 0.77 MG/DL (ref 0.7–1.2)
EOSINOPHILS RELATIVE PERCENT: 3 % (ref 1–4)
ETEC ELTA+ESTB GENES STL QL NAA+PROBE: NORMAL
GFR SERPL CREATININE-BSD FRML MDRD: >60 ML/MIN/1.73M2
GLUCOSE SERPL-MCNC: 85 MG/DL (ref 70–99)
HCT VFR BLD AUTO: 41.6 % (ref 40.7–50.3)
HGB BLD-MCNC: 14.3 G/DL (ref 13–17)
IMMATURE GRANULOCYTES: 0 %
LYMPHOCYTES # BLD: 21 % (ref 24–43)
MCH RBC QN AUTO: 29.1 PG (ref 25.2–33.5)
MCHC RBC AUTO-ENTMCNC: 34.4 G/DL (ref 25.2–33.5)
MCV RBC AUTO: 84.6 FL (ref 82.6–102.9)
MONOCYTES # BLD: 14 % (ref 3–12)
NRBC AUTOMATED: 0 PER 100 WBC
P SHIGELLOIDES DNA STL QL NAA+PROBE: NORMAL
PDW BLD-RTO: 12.3 % (ref 11.8–14.4)
PLATELET # BLD AUTO: 223 K/UL (ref 138–453)
PMV BLD AUTO: 9.5 FL (ref 8.1–13.5)
POTASSIUM SERPL-SCNC: 3.6 MMOL/L (ref 3.7–5.3)
RBC # BLD: 4.92 M/UL (ref 4.21–5.77)
SALMONELLA DNA SPEC QL NAA+PROBE: NORMAL
SEG NEUTROPHILS: 61 % (ref 36–65)
SEGMENTED NEUTROPHILS ABSOLUTE COUNT: 3.29 K/UL (ref 1.5–8.1)
SHIGA TOXIN STX GENE SPEC NAA+PROBE: NORMAL
SHIGELLA DNA SPEC QL NAA+PROBE: NORMAL
SODIUM SERPL-SCNC: 138 MMOL/L (ref 135–144)
SPECIMEN DESCRIPTION: NORMAL
V CHOL+PARA RFBL+TRKH+TNAA STL QL NAA+PR: NORMAL
WBC # BLD AUTO: 5.4 K/UL (ref 3.5–11.3)
Y ENTERO RECN STL QL NAA+PROBE: NORMAL

## 2023-03-05 PROCEDURE — 99232 SBSQ HOSP IP/OBS MODERATE 35: CPT | Performed by: FAMILY MEDICINE

## 2023-03-05 PROCEDURE — 36415 COLL VENOUS BLD VENIPUNCTURE: CPT

## 2023-03-05 PROCEDURE — 2580000003 HC RX 258: Performed by: NURSE PRACTITIONER

## 2023-03-05 PROCEDURE — 80048 BASIC METABOLIC PNL TOTAL CA: CPT

## 2023-03-05 PROCEDURE — 6360000002 HC RX W HCPCS: Performed by: NURSE PRACTITIONER

## 2023-03-05 PROCEDURE — 99231 SBSQ HOSP IP/OBS SF/LOW 25: CPT | Performed by: SURGERY

## 2023-03-05 PROCEDURE — 85025 COMPLETE CBC W/AUTO DIFF WBC: CPT

## 2023-03-05 PROCEDURE — 6360000002 HC RX W HCPCS: Performed by: FAMILY MEDICINE

## 2023-03-05 PROCEDURE — C9113 INJ PANTOPRAZOLE SODIUM, VIA: HCPCS | Performed by: NURSE PRACTITIONER

## 2023-03-05 RX ORDER — SODIUM CHLORIDE AND POTASSIUM CHLORIDE 150; 900 MG/100ML; MG/100ML
INJECTION, SOLUTION INTRAVENOUS CONTINUOUS
Status: DISCONTINUED | OUTPATIENT
Start: 2023-03-05 | End: 2023-03-05 | Stop reason: HOSPADM

## 2023-03-05 RX ORDER — LEVOFLOXACIN 750 MG/1
750 TABLET ORAL DAILY
Qty: 7 TABLET | Refills: 0 | Status: SHIPPED | OUTPATIENT
Start: 2023-03-05 | End: 2023-03-12

## 2023-03-05 RX ADMIN — POTASSIUM CHLORIDE AND SODIUM CHLORIDE: 900; 150 INJECTION, SOLUTION INTRAVENOUS at 10:33

## 2023-03-05 RX ADMIN — SODIUM CHLORIDE, PRESERVATIVE FREE 40 MG: 5 INJECTION INTRAVENOUS at 08:56

## 2023-03-05 RX ADMIN — HYDROMORPHONE HYDROCHLORIDE 1 MG: 1 INJECTION, SOLUTION INTRAMUSCULAR; INTRAVENOUS; SUBCUTANEOUS at 17:45

## 2023-03-05 RX ADMIN — PIPERACILLIN AND TAZOBACTAM 3375 MG: 3; .375 INJECTION, POWDER, FOR SOLUTION INTRAVENOUS at 12:10

## 2023-03-05 RX ADMIN — PIPERACILLIN AND TAZOBACTAM 3375 MG: 3; .375 INJECTION, POWDER, FOR SOLUTION INTRAVENOUS at 04:08

## 2023-03-05 RX ADMIN — HYDROMORPHONE HYDROCHLORIDE 1 MG: 1 INJECTION, SOLUTION INTRAMUSCULAR; INTRAVENOUS; SUBCUTANEOUS at 07:21

## 2023-03-05 ASSESSMENT — PAIN DESCRIPTION - ORIENTATION
ORIENTATION: LOWER
ORIENTATION: LOWER

## 2023-03-05 ASSESSMENT — PAIN SCALES - GENERAL
PAINLEVEL_OUTOF10: 7
PAINLEVEL_OUTOF10: 8

## 2023-03-05 ASSESSMENT — PAIN DESCRIPTION - LOCATION
LOCATION: BACK
LOCATION: BACK

## 2023-03-05 ASSESSMENT — PAIN DESCRIPTION - DESCRIPTORS
DESCRIPTORS: ACHING;SHARP
DESCRIPTORS: ACHING

## 2023-03-05 ASSESSMENT — PAIN - FUNCTIONAL ASSESSMENT
PAIN_FUNCTIONAL_ASSESSMENT: PREVENTS OR INTERFERES SOME ACTIVE ACTIVITIES AND ADLS
PAIN_FUNCTIONAL_ASSESSMENT: ACTIVITIES ARE NOT PREVENTED

## 2023-03-05 NOTE — DISCHARGE SUMMARY
Discharge Summary    [facility] Patient Status:  Inpatient    1945 MRN [id number]   Location [facility] Attending Ngerito Justice MD   Hosp Day # 2 PCP Harini Euceda MD     Date of Admission: 3/3/2023    Date of Discharge: 3/5/2023    Admitting Diagnosis: Pneumoperitoneum of unknown etiology [K66.8]    Discharge Diagnosis: Principal Problem:    Pneumoperitoneum of unknown etiology  Resolved Problems:    * No resolved hospital problems. *      Reason for Admission/HPI: presented with abdominal pain,nause vomiting and diarrhea. CT abdomen with free air source uncertain. Hospital Course: Patient was kept NPO had NG placed was on IV Zosyn and Protonix surgery consulted NG was removed due to discomfort ,patient has done fine since yesterday no nausea or emesis ,diarrhea has resolved was started on oral today tolerated well was ok for discharge per surgery.     Consultations: General surgery    Procedures: None    Complications: None    Disposition: Home    Discharge Condition: Good    Discharge Medications:      Medication List        START taking these medications      levoFLOXacin 750 MG tablet  Commonly known as: Levaquin  Take 1 tablet by mouth daily for 7 days            CONTINUE taking these medications      atorvastatin 40 MG tablet  Commonly known as: LIPITOR  take 1 tablet by mouth once daily     clopidogrel 75 MG tablet  Commonly known as: PLAVIX  take 1 tablet by mouth daily     cyclobenzaprine 10 MG tablet  Commonly known as: FLEXERIL  Take 1 tablet by mouth 3 times daily as needed for Muscle spasms     diclofenac sodium 1 % Gel  Commonly known as: VOLTAREN  Apply 4 g topically 4 times daily as needed for Pain     dicyclomine 10 MG capsule  Commonly known as: BENTYL  take 1 capsule by mouth four times a day if needed for BOWEL CRAMPING OR BLOATING     HYDROcodone-acetaminophen  MG per tablet  Commonly known as: NORCO     levalbuterol 45 MCG/ACT inhaler  Commonly known as: Xopenex HFA  Inhale 1 puff into the lungs every 6 hours as needed for Wheezing or Shortness of Breath     metoprolol tartrate 50 MG tablet  Commonly known as: LOPRESSOR  take 1 tablet by mouth twice a day     nitroGLYCERIN 0.4 MG SL tablet  Commonly known as: NITROSTAT  Place 1 tablet under the tongue every 5 minutes as needed for Chest pain     ondansetron 4 MG tablet  Commonly known as: ZOFRAN  take 1 tablet by mouth every 8 hours if needed for nausea and vomiting     pantoprazole 40 MG tablet  Commonly known as: PROTONIX  take 1 tablet by mouth once daily     triamcinolone 0.1 % cream  Commonly known as: KENALOG  Apply topically 2 times daily. STOP taking these medications      fexofenadine 180 MG tablet  Commonly known as: ALLEGRA               Where to Get Your Medications        These medications were sent to [facility] , [address] [place] [address]      Phone: [phone]   levoFLOXacin 750 MG tablet         Follow up Visits:  Follow-up with Surgery,f/u with PCP in 1 week       Total Time spent with patient and coordinating care:  20 minutes    Ileana Landry MD  3/5/2023  6:11 PM

## 2023-03-05 NOTE — PROGRESS NOTES
Hospitalist Progress Note  3/5/2023 9:48 AM  Subjective:   Admit Date: 3/3/2023  PCP: Kelly Cruz MD    Interval History: Patient with pneumoperitoneum feeling better,denies any abdominal discomfort . NG has been out since yesterday. BP is stable this morning. K is down a little    Diet: Diet NPO  Medications:   Scheduled Meds:   pantoprazole (PROTONIX) 40 mg injection  40 mg IntraVENous Daily    sodium chloride flush  5-40 mL IntraVENous 2 times per day    piperacillin-tazobactam  3,375 mg IntraVENous Q8H     Continuous Infusions:   sodium chloride 100 mL/hr at 03/05/23 0546    sodium chloride       CBC:   Recent Labs     03/03/23  1758 03/04/23  0550 03/05/23  0553   WBC 4.0 4.9 5.4   HGB 13.8 13.8 14.3    212 223     BMP:    Recent Labs     03/03/23 1758 03/04/23  0550 03/05/23  0553    139 138   K 4.4 3.7 3.6*    105 103   CO2 28 25 22   BUN 11 8 9   CREATININE 0.85 0.82 0.77   GLUCOSE 120* 103* 85     Hepatic:   Recent Labs     03/03/23 1758   AST 26   ALT 22   BILITOT 0.6   ALKPHOS 149*     Troponin: No results for input(s): TROPONINI in the last 72 hours. BNP: No results for input(s): BNP in the last 72 hours. Lipids: No results for input(s): CHOL, HDL in the last 72 hours. Invalid input(s): LDLCALCU  INR: No results for input(s): INR in the last 72 hours. Objective:   Vitals: /63   Pulse 71   Temp 98.5 °F (36.9 °C) (Tympanic)   Resp 18   Ht 5' 6\" (1.676 m)   Wt 132 lb 4.8 oz (60 kg)   SpO2 95%   BMI 21.35 kg/m²   General appearance: alert and cooperative with exam  HEENT: Eye: Normal external eye, conjunctiva, lids cornea, PATRICE.   Neck: no adenopathy, no carotid bruit, no JVD, supple, symmetrical, trachea midline, and thyroid not enlarged, symmetric, no tenderness/mass/nodules  Lungs: clear to auscultation bilaterally  Heart: regular rate and rhythm, S1, S2 normal, no murmur, click, rub or gallop  Abdomen: soft, non-tender; bowel sounds normal; no masses,  no organomegaly  Extremities: extremities normal, atraumatic, no cyanosis or edema  Neurologic: Mental status: Alert, oriented, thought content appropriate    Assessment and Plan:   Pneumoperitoneum  Hypokalemia  HTN       Plan:Patient remains NPO will add K to IV. Further plans as per surgery       Prostate cancer ([facility]     Hyperlipidemia     CAD (coronary artery disease) SP drug eluting stent 2 stents to RCA and 1 to L circ      NSTEMI (non-ST elevated myocardial infarction) ([facility]     Type 2 diabetes mellitus without complication (HCC)     History of colon polyps     Chronic midline low back pain with bilateral sciatica     Pneumoperitoneum of unknown etiology     Adenocarcinoma of prostate (HCC)     Gastroesophageal reflux disease without esophagitis     Degeneration of intervertebral disc of lumbosacral region     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

## 2023-03-05 NOTE — PROGRESS NOTES
No increased pain or nausea since NG discontinued. /63   Pulse 71   Temp 98.5 °F (36.9 °C) (Tympanic)   Resp 18   Ht 5' 6\" (1.676 m)   Wt 132 lb 4.8 oz (60 kg)   SpO2 95%   BMI 21.35 kg/m²     Abd - soft, completely nontender, active bowel sounds    IMP/PLAN  1) Perforated Ulcer - no symptoms or signs at this time. Will try diet.

## 2023-03-05 NOTE — PLAN OF CARE
Problem: ABCDS Injury Assessment  Goal: Absence of physical injury  Outcome: Progressing     Problem: Discharge Planning  Goal: Discharge to home or other facility with appropriate resources  Outcome: Progressing     Problem: Safety - Adult  Goal: Free from fall injury  Outcome: Progressing     Problem: Gastrointestinal - Adult  Goal: Minimal or absence of nausea and vomiting  Outcome: Progressing  Flowsheets (Taken 3/5/2023 1781)  Minimal or absence of nausea and vomiting:   Administer IV fluids as ordered to ensure adequate hydration   Provide nonpharmacologic comfort measures as appropriate   Maintain NPO status until nausea and vomiting are resolved   Advance diet as tolerated, if ordered  Note: Patient without c/o N, V, or D.      Problem: Gastrointestinal - Adult  Goal: Maintains or returns to baseline bowel function  Outcome: Progressing

## 2023-03-05 NOTE — PLAN OF CARE
Problem: ABCDS Injury Assessment  Goal: Absence of physical injury  Outcome: Progressing     Problem: Discharge Planning  Goal: Discharge to home or other facility with appropriate resources  Outcome: Progressing  Flowsheets (Taken 3/4/2023 2000)  Discharge to home or other facility with appropriate resources:   Identify barriers to discharge with patient and caregiver   Arrange for needed discharge resources and transportation as appropriate     Problem: Safety - Adult  Goal: Free from fall injury  Outcome: Progressing     Problem: Gastrointestinal - Adult  Goal: Minimal or absence of nausea and vomiting  Outcome: Progressing  Flowsheets (Taken 3/4/2023 2000)  Minimal or absence of nausea and vomiting:   Administer IV fluids as ordered to ensure adequate hydration   Maintain NPO status until nausea and vomiting are resolved   Administer ordered antiemetic medications as needed  Goal: Maintains or returns to baseline bowel function  Outcome: Progressing  Flowsheets (Taken 3/4/2023 2000)  Maintains or returns to baseline bowel function:   Assess bowel function   Administer IV fluids as ordered to ensure adequate hydration   Administer ordered medications as needed   Encourage mobilization and activity

## 2023-03-06 ENCOUNTER — FOLLOWUP TELEPHONE ENCOUNTER (OUTPATIENT)
Dept: INPATIENT UNIT | Age: 78
End: 2023-03-06

## 2023-03-06 LAB
EKG ATRIAL RATE: 53 BPM
EKG ATRIAL RATE: 65 BPM
EKG ATRIAL RATE: 70 BPM
EKG P AXIS: 33 DEGREES
EKG P AXIS: 44 DEGREES
EKG P AXIS: 45 DEGREES
EKG P-R INTERVAL: 188 MS
EKG P-R INTERVAL: 200 MS
EKG P-R INTERVAL: 206 MS
EKG Q-T INTERVAL: 422 MS
EKG Q-T INTERVAL: 434 MS
EKG Q-T INTERVAL: 470 MS
EKG QRS DURATION: 100 MS
EKG QRS DURATION: 106 MS
EKG QRS DURATION: 96 MS
EKG QTC CALCULATION (BAZETT): 438 MS
EKG QTC CALCULATION (BAZETT): 441 MS
EKG QTC CALCULATION (BAZETT): 468 MS
EKG R AXIS: -3 DEGREES
EKG R AXIS: 13 DEGREES
EKG R AXIS: 7 DEGREES
EKG T AXIS: -30 DEGREES
EKG T AXIS: -44 DEGREES
EKG T AXIS: 52 DEGREES
EKG VENTRICULAR RATE: 53 BPM
EKG VENTRICULAR RATE: 65 BPM
EKG VENTRICULAR RATE: 70 BPM

## 2023-03-10 ENCOUNTER — OFFICE VISIT (OUTPATIENT)
Dept: CARDIOLOGY | Age: 78
End: 2023-03-10
Payer: MEDICARE

## 2023-03-10 VITALS
HEART RATE: 65 BPM | SYSTOLIC BLOOD PRESSURE: 148 MMHG | BODY MASS INDEX: 22.63 KG/M2 | WEIGHT: 140.2 LBS | DIASTOLIC BLOOD PRESSURE: 72 MMHG

## 2023-03-10 DIAGNOSIS — R11.0 NAUSEA: ICD-10-CM

## 2023-03-10 DIAGNOSIS — I10 PRIMARY HYPERTENSION: Primary | ICD-10-CM

## 2023-03-10 DIAGNOSIS — Z01.810 PRE-OPERATIVE CARDIOVASCULAR EXAMINATION: ICD-10-CM

## 2023-03-10 DIAGNOSIS — I25.10 CORONARY ARTERY DISEASE INVOLVING NATIVE CORONARY ARTERY OF NATIVE HEART WITHOUT ANGINA PECTORIS: ICD-10-CM

## 2023-03-10 PROCEDURE — 99213 OFFICE O/P EST LOW 20 MIN: CPT | Performed by: SURGERY

## 2023-03-10 PROCEDURE — 93005 ELECTROCARDIOGRAM TRACING: CPT | Performed by: SURGERY

## 2023-03-10 RX ORDER — DICYCLOMINE HYDROCHLORIDE 10 MG/1
CAPSULE ORAL
Qty: 30 CAPSULE | Refills: 2 | Status: SHIPPED | OUTPATIENT
Start: 2023-03-10

## 2023-03-10 NOTE — PROGRESS NOTES
Today's Date: 3/10/2023  Patient's Name: Willi Siddiqui  Patient's age: 68 y.o., 1945    Subjective: The patient is a 68y.o. year old, , male is in the office for CAD follow up . denies  any chest pain or discomfort. No orthopnea or PND. Aissatou any palpitation, dizziness or syncope. He is completely asymptomatic from cardiac stand point. Blood pressure elevated in the office stated in pain distress and hasn't had his procedure on his back yet. Patient denies any problem with the blood pressure at home stated blood pressure 130/70 at home   Family History:  Family History   Problem Relation Age of Onset    Stroke Father          Past Medical History:   has a past medical history of Bronchiectasis with acute lower respiratory infection ([address] Chronic bronchitis ([address] DJD (degenerative joint disease), Enteritis, GERD (gastroesophageal reflux disease), Hearing loss, Hyperlipidemia, Hypertension, Osteoarthritis of left knee, Overactive bladder, Prostate cancer ([address] Seasonal allergies, Spondylosis, and Type II or unspecified type diabetes mellitus without mention of complication, not stated as uncontrolled. Past Surgical History:   has a past surgical history that includes Upper gastrointestinal endoscopy (03/15/2002); Colonoscopy (03/15/2002); Prostatectomy (02/09/2010); Total knee arthroplasty (Left, 04/23/2013); Tonsillectomy; Cardiac surgery; Coronary angioplasty with stent; other surgical history (08/25/2009); pr colon ca scrn not hi rsk ind (N/A, 2/27/2017); and Total knee arthroplasty (Right, 02/06/2018). Home Medications:  Prior to Admission medications    Medication Sig Start Date End Date Taking?  Authorizing Provider   levoFLOXacin (LEVAQUIN) 750 MG tablet Take 1 tablet by mouth daily for 7 days 3/5/23 3/12/23 Yes Marycruz Perry MD   clopidogrel (PLAVIX) 75 MG tablet take 1 tablet by mouth daily 2/23/23  Yes Caridad Blankenship MD   dicyclomine (BENTYL) 10 MG capsule take 1 capsule by mouth four times a day if needed for BOWEL CRAMPING OR BLOATING 12/13/22  Yes Abbey Lr MD   metoprolol tartrate (LOPRESSOR) 50 MG tablet take 1 tablet by mouth twice a day 10/31/22  Yes Abbey Lr MD   cyclobenzaprine (FLEXERIL) 10 MG tablet Take 1 tablet by mouth 3 times daily as needed for Muscle spasms 8/18/22  Yes Abbey Lr MD   levalbuterol [facility]) 45 MCG/ACT inhaler Inhale 1 puff into the lungs every 6 hours as needed for Wheezing or Shortness of Breath 8/18/22 8/18/23 Yes Milton Oliver MD   pantoprazole (PROTONIX) 40 MG tablet take 1 tablet by mouth once daily 6/3/22  Yes Abbey Lr MD   atorvastatin (LIPITOR) 40 MG tablet take 1 tablet by mouth once daily 5/10/22  Yes Abbey Lr MD   ondansetron (ZOFRAN) 4 MG tablet take 1 tablet by mouth every 8 hours if needed for nausea and vomiting 3/22/22  Yes PAYTON Baires CNP   diclofenac sodium (VOLTAREN) 1 % GEL Apply 4 g topically 4 times daily as needed for Pain 11/22/21  Yes Abundio Morillo DO   HYDROcodone-acetaminophen (NORCO)  MG per tablet Take 1 tablet by mouth every 6 hours as needed for Pain. Yes Jerod Littlejohn MD   triamcinolone (KENALOG) 0.1 % cream Apply topically 2 times daily. 5/15/20  Yes Abbey Lr MD   nitroGLYCERIN (NITROSTAT) 0.4 MG SL tablet Place 1 tablet under the tongue every 5 minutes as needed for Chest pain 5/15/20  Yes Abbey Lr MD       Allergies:  Zonisamide    Social History:   reports that he quit smoking about 35 years ago. His smoking use included cigarettes. He has a 10.00 pack-year smoking history. He has never used smokeless tobacco. He reports that he does not drink alcohol and does not use drugs. Review of Systems:  Constitutional: there has been no decline in functional capacity  Eyes: No visual changes or diplopia. No scleral icterus. ENT: No Headaches, hearing loss or vertigo.  No mouth sores or sore throat. Cardiovascular: As above. Respiratory: No SOB, cough or hemoptysis. Gastrointestinal: No abdominal pain, appetite loss, blood in stools. No change in bowel or bladder habits. Genitourinary: No dysuria, trouble voiding, or hematuria. Musculoskeletal: Positive for osteoarthritis in the back and knees and hips  Integumentary: No rash or pruritis. Psychiatric: No anxiety, or depression. Hematologic/Lymphatic: No abnormal bruising or bleeding, blood clots or swollen lymph nodes. Allergic/Immunologic: No nasal congestion or hives. Physical Exam:  BP (!) 150/68 (Site: Right Upper Arm, Position: Sitting)   Pulse 65   Wt 140 lb 3.2 oz (63.6 kg)   BMI 22.63 kg/m²   Constitutional and General Appearance: alert, cooperative, no distress and appears stated age  HEENT: PERRL, no cervical lymphadenopathy. No masses palpable. Normal oral mucosa  Respiratory:  Normal excursion and expansion without use of accessory muscles  Resp Auscultation: Good respiratory effort. No for increased work of breathing. On auscultation: clear to auscultation bilaterally  Cardiovascular: The apical impulse is not displaced  Heart tones are crisp and normal. regular S1 and S2.  Grade 2 x 6 ejection systolic murmur in the aortic area  Jugular venous pulsation Normal  The carotid upstroke is normal in amplitude and contour without delay or bruit  Peripheral pulses are symmetrical and full   Abdomen:   No masses or tenderness  Bowel sounds present  Extremities:   No Cyanosis or Clubbing   Lower extremity edema: No   Skin: Warm and dry  Cardiac Data:  EKG: NSR     TTE 11/18/15  1. All cardiac chambers are normal in dimension. 2. Left ventricular systolic function is normal. Ejection  fraction is 60%. 3. Basal inferior wall is hypokinetic. 4. Grade II diastolic dysfunction. 5. Normal right ventricular size and function. 6. Mild mitral annular calcification. Mild mitral regurgitation.   7. Mildly sclerotic aortic valve with mild regurgitation. 8. Mild tricuspid regurgitation. RVSP is 39 mm Hg. 9. No pericardial effusion. Last Cath: 7/7/2015 Has 2 RCA and 1 Circ stent. All were Resolute Integrity stents     Lexiscan stress test 9/21/2020  IMPRESSION:  1. Gut artifact inferiorly. No obvious ischemia or infarction. 2. Normal left ventricular ejection fraction 64%, normal wall motion. Labs:     CBC: No results for input(s): WBC, HGB, HCT, PLT in the last 72 hours. BMP: No results for input(s): NA, K, CO2, BUN, CREATININE, LABGLOM, GLUCOSE in the last 72 hours. PT/INR: No results for input(s): PROTIME, INR in the last 72 hours. FASTING LIPID PANEL:  Lab Results   Component Value Date/Time    HDL 37 12/06/2022 02:48 PM    TRIG 105 12/06/2022 02:48 PM     LIVER PROFILE:No results for input(s): AST, ALT, LABALBU in the last 72 hours.     IMPRESSION:    Patient Active Problem List   Diagnosis    Essential hypertension    Prostate cancer ([facility]    Hyperlipidemia    CAD (coronary artery disease) SP drug eluting stent 2 stents to RCA and 1 to L circ     NSTEMI (non-ST elevated myocardial infarction) ([facility]    Type 2 diabetes mellitus without complication (HCC)    History of colon polyps    Chronic midline low back pain with bilateral sciatica    Pneumoperitoneum of unknown etiology    Adenocarcinoma of prostate (HCC)    Gastroesophageal reflux disease without esophagitis    Degeneration of intervertebral disc of lumbosacral region    Mechanical breakdown of urinary sphincter implant (HCC)    Neurogenic claudication (HCC)    Rotator cuff tear arthropathy of both shoulders    Spinal stenosis, lumbar region, with neurogenic claudication    Chronic diastolic congestive heart failure (HCC)       RECOMMENDATIONS:  Cardiac stable -denies chest pain but hasn't have an echo for a while , advised on getting an echo patient stated that he wants to concentrate on his  back issues  and he is willing to do it with next visit-continue Plavix statin beta-blocker    Hypertension-elevated in the office secondary to pain-continue BB and ambulatory blood pressure monitoring at home    Follow-up in office in [address] [place] [address], [address] Cardiology Consult           [phone]

## 2023-03-10 NOTE — TELEPHONE ENCOUNTER
Melecio Archer called requesting a refill of the below medication which has been pended for you:     Requested Prescriptions     Pending Prescriptions Disp Refills    dicyclomine (BENTYL) 10 MG capsule 30 capsule 2     Sig: take 1 capsule by mouth four times a day if needed for BOWEL CRAMPING OR BLOATING       Last Appointment Date: 12/13/2022  Next Appointment Date: 6/13/2023    Allergies   Allergen Reactions    Zonisamide Other (See Comments)     ANXIETY

## 2023-03-31 ENCOUNTER — TELEPHONE (OUTPATIENT)
Dept: CARDIOLOGY | Age: 78
End: 2023-03-31

## 2023-03-31 NOTE — TELEPHONE ENCOUNTER
Last seen by [facility] on 3/10/23.  8/2022 was Dr. Alessandra Gupta. [facility] requesting cardiac clearance to hold plavix from 4/20-4/27/23 for scheduled procedure for Trial percutaneous implantation of leads with Dr. Yue Lo. Please advise. Fax [phone]. Phone is [phone].  (See fax scanned.)

## 2023-03-31 NOTE — PROGRESS NOTES
Physician Progress Note      PATIENTGeneral Call  CSN #:                  [id number]  :                       1945  ADMIT DATE:       3/3/2023 5:25 PM  [address] [place] DATE:        3/5/2023 6:55 PM  RESPONDING  PROVIDER #:        Matt De La Torre MD          QUERY TEXT:    Pt admitted with perforated antral ulcer and has documentation of \"chronic   pain syndrome\" on Norco at home and continued on DC. Please document any   correlating medical diagnosis in the medical record: The medical record reflects the following:  Risk Factors: chronic pain syndrome, chronic use of opioids  Clinical Indicators: H&P \"Chronic pain syndrome -- back pain -- on long term   Norco 10 mg, 5 tablets per day\", Norco routinely taken OP, held while PO and   given dilaudid, Norco resumed on d/c  Treatment: Norco/dilaudid, medication management    Thank you  Ana Paula Rosales RN, CRCR, CCDS  [email or web address]  Options provided:  -- Opioid dependence  -- Other - I will add my own diagnosis  -- Disagree - Not applicable / Not valid  -- Disagree - Clinically unable to determine / Unknown  -- Refer to Clinical Documentation Reviewer    PROVIDER RESPONSE TEXT:    Chronic pain syndrome    Query created by: Kathy Irizarry on 3/10/2023 11:41 AM      Electronically signed by:  Matt De La Torre MD 3/31/2023 9:57 AM

## 2023-04-03 NOTE — TELEPHONE ENCOUNTER
Moderate risk from cardiac standpoint   Ok to hold plavix 5 days preop and resume post op when ok with surgeon

## 2023-05-01 DIAGNOSIS — R21 RASH: ICD-10-CM

## 2023-05-01 RX ORDER — TRIAMCINOLONE ACETONIDE 1 MG/G
CREAM TOPICAL
Qty: 45 G | Refills: 3 | Status: SHIPPED | OUTPATIENT
Start: 2023-05-01

## 2023-05-01 NOTE — TELEPHONE ENCOUNTER
Susana Crain called requesting a refill of the below medication which has been pended for you:     Requested Prescriptions     Pending Prescriptions Disp Refills    triamcinolone (KENALOG) 0.1 % cream [Pharmacy Med Name: TRIAMCINOLONE 0.1% CREAM] 45 g 3     Sig: apply topically twice a day       Last Appointment Date: 12/13/2022  Next Appointment Date: 6/13/2023    Allergies   Allergen Reactions    Zonisamide Other (See Comments)     ANXIETY

## 2023-05-17 ENCOUNTER — OFFICE VISIT (OUTPATIENT)
Dept: PULMONOLOGY | Age: 78
End: 2023-05-17
Payer: MEDICARE

## 2023-05-17 VITALS
HEART RATE: 64 BPM | OXYGEN SATURATION: 96 % | HEIGHT: 66 IN | TEMPERATURE: 98.6 F | SYSTOLIC BLOOD PRESSURE: 116 MMHG | BODY MASS INDEX: 21.47 KG/M2 | WEIGHT: 133.6 LBS | DIASTOLIC BLOOD PRESSURE: 66 MMHG

## 2023-05-17 DIAGNOSIS — R11.0 NAUSEA: ICD-10-CM

## 2023-05-17 DIAGNOSIS — J47.9 BRONCHIECTASIS WITHOUT COMPLICATION (HCC): Primary | ICD-10-CM

## 2023-05-17 PROCEDURE — 3074F SYST BP LT 130 MM HG: CPT | Performed by: INTERNAL MEDICINE

## 2023-05-17 PROCEDURE — 3078F DIAST BP <80 MM HG: CPT | Performed by: INTERNAL MEDICINE

## 2023-05-17 PROCEDURE — 1123F ACP DISCUSS/DSCN MKR DOCD: CPT | Performed by: INTERNAL MEDICINE

## 2023-05-17 PROCEDURE — 99213 OFFICE O/P EST LOW 20 MIN: CPT | Performed by: INTERNAL MEDICINE

## 2023-05-17 PROCEDURE — G8427 DOCREV CUR MEDS BY ELIG CLIN: HCPCS | Performed by: INTERNAL MEDICINE

## 2023-05-17 PROCEDURE — G8420 CALC BMI NORM PARAMETERS: HCPCS | Performed by: INTERNAL MEDICINE

## 2023-05-17 PROCEDURE — 1036F TOBACCO NON-USER: CPT | Performed by: INTERNAL MEDICINE

## 2023-05-18 DIAGNOSIS — E78.49 OTHER HYPERLIPIDEMIA: ICD-10-CM

## 2023-05-18 RX ORDER — PANTOPRAZOLE SODIUM 40 MG/1
TABLET, DELAYED RELEASE ORAL
Qty: 90 TABLET | Refills: 3 | Status: SHIPPED | OUTPATIENT
Start: 2023-05-18

## 2023-05-18 RX ORDER — ATORVASTATIN CALCIUM 40 MG/1
TABLET, FILM COATED ORAL
Qty: 90 TABLET | Refills: 3 | Status: SHIPPED | OUTPATIENT
Start: 2023-05-18

## 2023-05-18 RX ORDER — DICYCLOMINE HYDROCHLORIDE 10 MG/1
CAPSULE ORAL
Qty: 30 CAPSULE | Refills: 2 | Status: SHIPPED | OUTPATIENT
Start: 2023-05-18

## 2023-05-18 NOTE — TELEPHONE ENCOUNTER
Cristian Thomas called requesting a refill of the below medication which has been pended for you:     Requested Prescriptions     Pending Prescriptions Disp Refills    pantoprazole (PROTONIX) 40 MG tablet [Pharmacy Med Name: PANTOPRAZOLE SOD DR 40 MG TAB] 90 tablet 3     Sig: take 1 tablet by mouth once daily    atorvastatin (LIPITOR) 40 MG tablet [Pharmacy Med Name: ATORVASTATIN 40 MG TABLET] 90 tablet 3     Sig: take 1 tablet by mouth once daily       Last Appointment Date: 12/13/2022  Next Appointment Date: 6/13/2023    Allergies   Allergen Reactions    Zonisamide Other (See Comments)     ANXIETY

## 2023-05-20 NOTE — PROGRESS NOTES
evaluation and clinical thinking. Thank you for allowing me to participate in his care. Sincerely,      Electronically signed by Tammie Loo MD on 5/20/2023 at 4:36 PM       Please note that this chart was generated using voice recognition Dragon dictation software. Although every effort was made to ensure the accuracy of this automated transcription, some errors in transcription may have occurred.

## 2023-07-05 ENCOUNTER — TELEPHONE (OUTPATIENT)
Dept: CARDIOLOGY | Age: 78
End: 2023-07-05

## 2023-07-05 ENCOUNTER — APPOINTMENT (OUTPATIENT)
Dept: CT IMAGING | Age: 78
DRG: 392 | End: 2023-07-05
Payer: MEDICARE

## 2023-07-05 ENCOUNTER — HOSPITAL ENCOUNTER (OUTPATIENT)
Dept: NON INVASIVE DIAGNOSTICS | Age: 78
Discharge: HOME | DRG: 392 | End: 2023-07-05
Payer: MEDICARE

## 2023-07-05 ENCOUNTER — APPOINTMENT (OUTPATIENT)
Dept: GENERAL RADIOLOGY | Age: 78
DRG: 392 | End: 2023-07-05
Payer: MEDICARE

## 2023-07-05 ENCOUNTER — HOSPITAL ENCOUNTER (INPATIENT)
Age: 78
LOS: 2 days | Discharge: HOME | DRG: 392 | End: 2023-07-07
Attending: EMERGENCY MEDICINE | Admitting: INTERNAL MEDICINE
Payer: MEDICARE

## 2023-07-05 DIAGNOSIS — R11.0 NAUSEA: ICD-10-CM

## 2023-07-05 DIAGNOSIS — J47.9 BRONCHIECTASIS WITHOUT COMPLICATION (HCC): ICD-10-CM

## 2023-07-05 DIAGNOSIS — R10.9 ABDOMINAL PAIN, UNSPECIFIED ABDOMINAL LOCATION: Primary | ICD-10-CM

## 2023-07-05 PROBLEM — J42 CHRONIC BRONCHITIS (HCC): Status: ACTIVE | Noted: 2023-07-05

## 2023-07-05 PROBLEM — R10.13 EPIGASTRIC PAIN: Status: ACTIVE | Noted: 2023-07-05

## 2023-07-05 LAB
ALBUMIN SERPL-MCNC: 4.1 G/DL (ref 3.5–5.2)
ALBUMIN/GLOB SERPL: 1.6 {RATIO} (ref 1–2.5)
ALP SERPL-CCNC: 184 U/L (ref 40–129)
ALT SERPL-CCNC: 13 U/L (ref 5–41)
AMYLASE SERPL-CCNC: 87 U/L (ref 28–100)
ANION GAP SERPL CALCULATED.3IONS-SCNC: 10 MMOL/L (ref 9–17)
AST SERPL-CCNC: 19 U/L
BASOPHILS # BLD: 0.03 K/UL (ref 0–0.2)
BASOPHILS NFR BLD: 1 % (ref 0–2)
BILIRUB DIRECT SERPL-MCNC: 0.1 MG/DL
BILIRUB INDIRECT SERPL-MCNC: 0.4 MG/DL (ref 0–1)
BILIRUB SERPL-MCNC: 0.5 MG/DL (ref 0.3–1.2)
BILIRUB UR QL STRIP: NEGATIVE
BUN SERPL-MCNC: 16 MG/DL (ref 8–23)
CALCIUM SERPL-MCNC: 9.7 MG/DL (ref 8.6–10.4)
CHLORIDE SERPL-SCNC: 99 MMOL/L (ref 98–107)
CLARITY UR: CLEAR
CO2 SERPL-SCNC: 29 MMOL/L (ref 20–31)
COLOR UR: YELLOW
COMMENT UA: ABNORMAL
CREAT SERPL-MCNC: 0.82 MG/DL (ref 0.7–1.2)
EKG ATRIAL RATE: 52 BPM
EKG ATRIAL RATE: 57 BPM
EKG P AXIS: 26 DEGREES
EKG P AXIS: 6 DEGREES
EKG P-R INTERVAL: 180 MS
EKG P-R INTERVAL: 198 MS
EKG Q-T INTERVAL: 440 MS
EKG Q-T INTERVAL: 442 MS
EKG QRS DURATION: 110 MS
EKG QRS DURATION: 96 MS
EKG QTC CALCULATION (BAZETT): 409 MS
EKG QTC CALCULATION (BAZETT): 430 MS
EKG R AXIS: -7 DEGREES
EKG R AXIS: 6 DEGREES
EKG T AXIS: 1 DEGREES
EKG T AXIS: 1 DEGREES
EKG VENTRICULAR RATE: 52 BPM
EKG VENTRICULAR RATE: 57 BPM
EOSINOPHIL # BLD: 0.28 K/UL (ref 0–0.44)
EOSINOPHILS RELATIVE PERCENT: 6 % (ref 1–4)
ERYTHROCYTE [DISTWIDTH] IN BLOOD BY AUTOMATED COUNT: 12.6 % (ref 11.8–14.4)
GFR SERPL CREATININE-BSD FRML MDRD: >60 ML/MIN/1.73M2
GLUCOSE SERPL-MCNC: 101 MG/DL (ref 70–99)
GLUCOSE UR STRIP-MCNC: NEGATIVE MG/DL
HCT VFR BLD AUTO: 38.5 % (ref 40.7–50.3)
HGB BLD-MCNC: 13.2 G/DL (ref 13–17)
HGB UR QL STRIP.AUTO: NEGATIVE
IMM GRANULOCYTES # BLD AUTO: <0.03 K/UL (ref 0–0.3)
IMM GRANULOCYTES NFR BLD: 0 %
KETONES UR STRIP-MCNC: ABNORMAL MG/DL
LACTATE BLDV-SCNC: 0.8 MMOL/L (ref 0.5–2.2)
LEUKOCYTE ESTERASE UR QL STRIP: NEGATIVE
LIPASE SERPL-CCNC: 8 U/L (ref 13–60)
LYMPHOCYTES # BLD: 14 % (ref 24–43)
LYMPHOCYTES NFR BLD: 0.65 K/UL (ref 1.1–3.7)
MCH RBC QN AUTO: 29.9 PG (ref 25.2–33.5)
MCHC RBC AUTO-ENTMCNC: 34.3 G/DL (ref 25.2–33.5)
MCV RBC AUTO: 87.1 FL (ref 82.6–102.9)
MONOCYTES NFR BLD: 0.44 K/UL (ref 0.1–1.2)
MONOCYTES NFR BLD: 10 % (ref 3–12)
NEUTROPHILS NFR BLD: 69 % (ref 36–65)
NEUTS SEG NFR BLD: 3.16 K/UL (ref 1.5–8.1)
NITRITE UR QL STRIP: NEGATIVE
NRBC BLD-RTO: 0 PER 100 WBC
PH UR STRIP: 8 [PH] (ref 5–6)
PLATELET # BLD AUTO: 243 K/UL (ref 138–453)
PMV BLD AUTO: 9.1 FL (ref 8.1–13.5)
POTASSIUM SERPL-SCNC: 3.8 MMOL/L (ref 3.7–5.3)
PROT SERPL-MCNC: 6.7 G/DL (ref 6.4–8.3)
PROT UR STRIP-MCNC: NEGATIVE MG/DL
RBC # BLD AUTO: 4.42 M/UL (ref 4.21–5.77)
SARS-COV-2 RDRP RESP QL NAA+PROBE: NOT DETECTED
SODIUM SERPL-SCNC: 138 MMOL/L (ref 135–144)
SP GR UR STRIP: 1.01 (ref 1.01–1.02)
SPECIMEN DESCRIPTION: NORMAL
TROPONIN I SERPL HS-MCNC: 6 NG/L (ref 0–22)
TROPONIN I SERPL HS-MCNC: 7 NG/L (ref 0–22)
UROBILINOGEN UR STRIP-ACNC: NORMAL
WBC OTHER # BLD: 4.6 K/UL (ref 3.5–11.3)

## 2023-07-05 PROCEDURE — 84484 ASSAY OF TROPONIN QUANT: CPT

## 2023-07-05 PROCEDURE — 99285 EMERGENCY DEPT VISIT HI MDM: CPT

## 2023-07-05 PROCEDURE — 81003 URINALYSIS AUTO W/O SCOPE: CPT

## 2023-07-05 PROCEDURE — 93005 ELECTROCARDIOGRAM TRACING: CPT | Performed by: EMERGENCY MEDICINE

## 2023-07-05 PROCEDURE — 36415 COLL VENOUS BLD VENIPUNCTURE: CPT

## 2023-07-05 PROCEDURE — 87635 SARS-COV-2 COVID-19 AMP PRB: CPT

## 2023-07-05 PROCEDURE — 6360000004 HC RX CONTRAST MEDICATION: Performed by: EMERGENCY MEDICINE

## 2023-07-05 PROCEDURE — 83605 ASSAY OF LACTIC ACID: CPT

## 2023-07-05 PROCEDURE — 93005 ELECTROCARDIOGRAM TRACING: CPT | Performed by: PAIN MEDICINE

## 2023-07-05 PROCEDURE — 99222 1ST HOSP IP/OBS MODERATE 55: CPT | Performed by: NURSE PRACTITIONER

## 2023-07-05 PROCEDURE — 82150 ASSAY OF AMYLASE: CPT

## 2023-07-05 PROCEDURE — 80053 COMPREHEN METABOLIC PANEL: CPT

## 2023-07-05 PROCEDURE — 82248 BILIRUBIN DIRECT: CPT

## 2023-07-05 PROCEDURE — 83690 ASSAY OF LIPASE: CPT

## 2023-07-05 PROCEDURE — 2060000000 HC ICU INTERMEDIATE R&B

## 2023-07-05 PROCEDURE — 2709999900 CT ABDOMEN PELVIS W IV CONTRAST

## 2023-07-05 PROCEDURE — 71045 X-RAY EXAM CHEST 1 VIEW: CPT

## 2023-07-05 PROCEDURE — 85027 COMPLETE CBC AUTOMATED: CPT

## 2023-07-05 PROCEDURE — 2580000003 HC RX 258: Performed by: NURSE PRACTITIONER

## 2023-07-05 RX ORDER — ALBUTEROL SULFATE 2.5 MG/3ML
2.5 SOLUTION RESPIRATORY (INHALATION)
Status: DISCONTINUED | OUTPATIENT
Start: 2023-07-05 | End: 2023-07-06

## 2023-07-05 RX ORDER — ONDANSETRON 4 MG/1
TABLET, FILM COATED ORAL
Qty: 30 TABLET | Refills: 0 | OUTPATIENT
Start: 2023-07-05

## 2023-07-05 RX ORDER — SODIUM CHLORIDE FOR INHALATION 0.9 %
3 VIAL, NEBULIZER (ML) INHALATION
Status: DISCONTINUED | OUTPATIENT
Start: 2023-07-05 | End: 2023-07-07 | Stop reason: HOSPADM

## 2023-07-05 RX ORDER — ALBUTEROL SULFATE 2.5 MG/3ML
2.5 SOLUTION RESPIRATORY (INHALATION)
Status: DISCONTINUED | OUTPATIENT
Start: 2023-07-05 | End: 2023-07-07 | Stop reason: HOSPADM

## 2023-07-05 RX ORDER — ACETAMINOPHEN 650 MG/1
650 SUPPOSITORY RECTAL EVERY 6 HOURS PRN
Status: DISCONTINUED | OUTPATIENT
Start: 2023-07-05 | End: 2023-07-07 | Stop reason: HOSPADM

## 2023-07-05 RX ORDER — SODIUM CHLORIDE 0.9 % (FLUSH) 0.9 %
5-40 SYRINGE (ML) INJECTION EVERY 12 HOURS SCHEDULED
Status: DISCONTINUED | OUTPATIENT
Start: 2023-07-05 | End: 2023-07-07 | Stop reason: HOSPADM

## 2023-07-05 RX ORDER — ACETAMINOPHEN 325 MG/1
650 TABLET ORAL EVERY 6 HOURS PRN
Status: DISCONTINUED | OUTPATIENT
Start: 2023-07-05 | End: 2023-07-07 | Stop reason: HOSPADM

## 2023-07-05 RX ORDER — ONDANSETRON 4 MG/1
4 TABLET, ORALLY DISINTEGRATING ORAL EVERY 8 HOURS PRN
Status: DISCONTINUED | OUTPATIENT
Start: 2023-07-05 | End: 2023-07-07 | Stop reason: HOSPADM

## 2023-07-05 RX ORDER — ONDANSETRON 2 MG/ML
4 INJECTION INTRAMUSCULAR; INTRAVENOUS EVERY 6 HOURS PRN
Status: DISCONTINUED | OUTPATIENT
Start: 2023-07-05 | End: 2023-07-07 | Stop reason: HOSPADM

## 2023-07-05 RX ORDER — LIDOCAINE 4 G/G
1 PATCH TOPICAL DAILY
Status: DISCONTINUED | OUTPATIENT
Start: 2023-07-06 | End: 2023-07-07 | Stop reason: HOSPADM

## 2023-07-05 RX ORDER — SODIUM CHLORIDE 9 MG/ML
INJECTION, SOLUTION INTRAVENOUS CONTINUOUS
Status: DISCONTINUED | OUTPATIENT
Start: 2023-07-05 | End: 2023-07-07 | Stop reason: HOSPADM

## 2023-07-05 RX ORDER — DICYCLOMINE HYDROCHLORIDE 10 MG/1
CAPSULE ORAL
Qty: 30 CAPSULE | Refills: 2 | Status: SHIPPED | OUTPATIENT
Start: 2023-07-05

## 2023-07-05 RX ORDER — METOPROLOL TARTRATE 5 MG/5ML
5 INJECTION INTRAVENOUS 2 TIMES DAILY
Status: DISCONTINUED | OUTPATIENT
Start: 2023-07-05 | End: 2023-07-07

## 2023-07-05 RX ORDER — SODIUM CHLORIDE 9 MG/ML
INJECTION, SOLUTION INTRAVENOUS PRN
Status: DISCONTINUED | OUTPATIENT
Start: 2023-07-05 | End: 2023-07-07 | Stop reason: HOSPADM

## 2023-07-05 RX ORDER — SODIUM CHLORIDE 0.9 % (FLUSH) 0.9 %
5-40 SYRINGE (ML) INJECTION PRN
Status: DISCONTINUED | OUTPATIENT
Start: 2023-07-05 | End: 2023-07-07 | Stop reason: HOSPADM

## 2023-07-05 RX ORDER — LIDOCAINE 50 MG/G
PATCH TOPICAL
COMMUNITY
Start: 2023-06-12

## 2023-07-05 RX ADMIN — IOPAMIDOL 100 ML: 755 INJECTION, SOLUTION INTRAVENOUS at 19:01

## 2023-07-05 RX ADMIN — SODIUM CHLORIDE: 9 INJECTION, SOLUTION INTRAVENOUS at 23:50

## 2023-07-05 ASSESSMENT — ENCOUNTER SYMPTOMS
TROUBLE SWALLOWING: 0
BACK PAIN: 1
WHEEZING: 0
VOMITING: 0
SORE THROAT: 0
ABDOMINAL DISTENTION: 1
DIARRHEA: 0
NAUSEA: 0
ABDOMINAL PAIN: 1
BLOOD IN STOOL: 0
CONSTIPATION: 0
SHORTNESS OF BREATH: 0

## 2023-07-05 ASSESSMENT — LIFESTYLE VARIABLES
HOW OFTEN DO YOU HAVE A DRINK CONTAINING ALCOHOL: NEVER
HOW MANY STANDARD DRINKS CONTAINING ALCOHOL DO YOU HAVE ON A TYPICAL DAY: PATIENT DOES NOT DRINK

## 2023-07-05 ASSESSMENT — PAIN - FUNCTIONAL ASSESSMENT: PAIN_FUNCTIONAL_ASSESSMENT: NONE - DENIES PAIN

## 2023-07-05 NOTE — ED PROVIDER NOTES
[facility]  eMERGENCY dEPARTMENT eNCOUnter      Pt Name: Jame Motley  MRN: [id number]  [address] [facility] 1945  Date of evaluation: 7/5/2023      CHIEF COMPLAINT       Chief Complaint   Patient presents with    Abdominal Pain    Bloated         HISTORY OF PRESENT ILLNESS    Jame Motley is a 68 y.o. male who presents with chief complaint of abdominal pain and bloating patient says that this is he has episodes of this is happened for quite some time is gotten worse recently he is currently not undergoing medical clearance to have a spinal stimulator placed for chronic lumbar pain at [facility] in [place], [place] patient says it when he does not eat his belly feels okay but any kind of food tends to cause bloating and pressure sensation he says his bowel movements are okay. Patient's had a problem with the urinary symptoms he has urinary incontinence he had prostate surgery years ago which left him incontinent he had a mechanical valve placed that lasted about 10 years and that is stopped working so he has to wear incontinence supplies there is been no numbness or tingling or paresthesias in his legs abdominal pain is made worse with any kind of eating back pains were not made worse with movement. He went over to urgent care today and they noticed that he had some EKG changes on EKG done so they recommended he come over here      REVIEW OF SYSTEMS         Review of Systems   Constitutional:  Negative for chills and fever. HENT:  Negative for congestion, dental problem, sore throat and trouble swallowing. Eyes:  Negative for visual disturbance. Respiratory:  Negative for shortness of breath and wheezing. Cardiovascular:  Negative for chest pain, palpitations and leg swelling. Gastrointestinal:  Positive for abdominal distention and abdominal pain. Negative for blood in stool, constipation, diarrhea, nausea and vomiting.    Genitourinary:  Negative for difficulty urinating, dysuria

## 2023-07-05 NOTE — TELEPHONE ENCOUNTER
ONE called wondering if the patient can hold the Plavix for 7 days instead of 5 for upcoming spinal cord stimulator surgery on 7/14/23. Clearance in media. The surgeon seen the initial clearance for 5 days but is requesting to hold the plavix for 7 days. Please advise.        Pamella at [address] [place] [address]  Ph: [phone]    Last Appt:  3/10/2023  Next Appt:   10/2/2023  Med verified in [address] [place] [address]

## 2023-07-06 PROBLEM — R10.9 ABDOMINAL PAIN: Status: ACTIVE | Noted: 2023-07-06

## 2023-07-06 LAB
ANION GAP SERPL CALCULATED.3IONS-SCNC: 10 MMOL/L (ref 9–17)
BASOPHILS # BLD: 0.03 K/UL (ref 0–0.2)
BASOPHILS NFR BLD: 1 % (ref 0–2)
BUN SERPL-MCNC: 12 MG/DL (ref 8–23)
BUN/CREAT SERPL: 17 (ref 9–20)
CALCIUM SERPL-MCNC: 9.2 MG/DL (ref 8.6–10.4)
CHLORIDE SERPL-SCNC: 103 MMOL/L (ref 98–107)
CO2 SERPL-SCNC: 27 MMOL/L (ref 20–31)
CREAT SERPL-MCNC: 0.72 MG/DL (ref 0.7–1.2)
EOSINOPHIL # BLD: 0.31 K/UL (ref 0–0.44)
EOSINOPHILS RELATIVE PERCENT: 7 % (ref 1–4)
ERYTHROCYTE [DISTWIDTH] IN BLOOD BY AUTOMATED COUNT: 12.3 % (ref 11.8–14.4)
GFR SERPL CREATININE-BSD FRML MDRD: >60 ML/MIN/1.73M2
GLUCOSE SERPL-MCNC: 86 MG/DL (ref 70–99)
HCT VFR BLD AUTO: 37.1 % (ref 40.7–50.3)
HGB BLD-MCNC: 12.5 G/DL (ref 13–17)
IMM GRANULOCYTES # BLD AUTO: <0.03 K/UL (ref 0–0.3)
IMM GRANULOCYTES NFR BLD: 0 %
LYMPHOCYTES # BLD: 17 % (ref 24–43)
LYMPHOCYTES NFR BLD: 0.78 K/UL (ref 1.1–3.7)
MCH RBC QN AUTO: 29.4 PG (ref 25.2–33.5)
MCHC RBC AUTO-ENTMCNC: 33.7 G/DL (ref 25.2–33.5)
MCV RBC AUTO: 87.3 FL (ref 82.6–102.9)
MONOCYTES NFR BLD: 0.45 K/UL (ref 0.1–1.2)
MONOCYTES NFR BLD: 10 % (ref 3–12)
NEUTROPHILS NFR BLD: 65 % (ref 36–65)
NEUTS SEG NFR BLD: 2.97 K/UL (ref 1.5–8.1)
NRBC BLD-RTO: 0 PER 100 WBC
PLATELET # BLD AUTO: 219 K/UL (ref 138–453)
PMV BLD AUTO: 8.9 FL (ref 8.1–13.5)
POTASSIUM SERPL-SCNC: 3.7 MMOL/L (ref 3.7–5.3)
RBC # BLD AUTO: 4.25 M/UL (ref 4.21–5.77)
SODIUM SERPL-SCNC: 140 MMOL/L (ref 135–144)
WBC OTHER # BLD: 4.6 K/UL (ref 3.5–11.3)

## 2023-07-06 PROCEDURE — 2500000003 HC RX 250 WO HCPCS: Performed by: NURSE PRACTITIONER

## 2023-07-06 PROCEDURE — 94669 MECHANICAL CHEST WALL OSCILL: CPT

## 2023-07-06 PROCEDURE — 6370000000 HC RX 637 (ALT 250 FOR IP): Performed by: NURSE PRACTITIONER

## 2023-07-06 PROCEDURE — 80048 BASIC METABOLIC PNL TOTAL CA: CPT

## 2023-07-06 PROCEDURE — 94761 N-INVAS EAR/PLS OXIMETRY MLT: CPT

## 2023-07-06 PROCEDURE — C9113 INJ PANTOPRAZOLE SODIUM, VIA: HCPCS | Performed by: NURSE PRACTITIONER

## 2023-07-06 PROCEDURE — 6370000000 HC RX 637 (ALT 250 FOR IP): Performed by: INTERNAL MEDICINE

## 2023-07-06 PROCEDURE — 99231 SBSQ HOSP IP/OBS SF/LOW 25: CPT | Performed by: INTERNAL MEDICINE

## 2023-07-06 PROCEDURE — 85027 COMPLETE CBC AUTOMATED: CPT

## 2023-07-06 PROCEDURE — 2060000000 HC ICU INTERMEDIATE R&B

## 2023-07-06 PROCEDURE — 94760 N-INVAS EAR/PLS OXIMETRY 1: CPT

## 2023-07-06 PROCEDURE — 6360000002 HC RX W HCPCS: Performed by: NURSE PRACTITIONER

## 2023-07-06 PROCEDURE — 2580000003 HC RX 258: Performed by: NURSE PRACTITIONER

## 2023-07-06 PROCEDURE — 36415 COLL VENOUS BLD VENIPUNCTURE: CPT

## 2023-07-06 RX ORDER — LACTULOSE 10 G/15ML
20 SOLUTION ORAL 3 TIMES DAILY
Status: DISCONTINUED | OUTPATIENT
Start: 2023-07-06 | End: 2023-07-07 | Stop reason: HOSPADM

## 2023-07-06 RX ADMIN — SODIUM CHLORIDE, PRESERVATIVE FREE 40 MG: 5 INJECTION INTRAVENOUS at 08:39

## 2023-07-06 RX ADMIN — Medication 2 SPRAY: at 08:52

## 2023-07-06 RX ADMIN — METOPROLOL TARTRATE 5 MG: 1 INJECTION, SOLUTION INTRAVENOUS at 21:12

## 2023-07-06 RX ADMIN — LACTULOSE 20 G: 20 SOLUTION ORAL at 15:28

## 2023-07-06 RX ADMIN — Medication 2 SPRAY: at 02:11

## 2023-07-06 RX ADMIN — HYDROMORPHONE HYDROCHLORIDE 1 MG: 1 INJECTION, SOLUTION INTRAMUSCULAR; INTRAVENOUS; SUBCUTANEOUS at 00:25

## 2023-07-06 RX ADMIN — SODIUM CHLORIDE: 9 INJECTION, SOLUTION INTRAVENOUS at 15:31

## 2023-07-06 RX ADMIN — SODIUM CHLORIDE, PRESERVATIVE FREE 10 ML: 5 INJECTION INTRAVENOUS at 08:42

## 2023-07-06 RX ADMIN — LACTULOSE 20 G: 20 SOLUTION ORAL at 10:25

## 2023-07-06 RX ADMIN — PIPERACILLIN AND TAZOBACTAM 3375 MG: 3; .375 INJECTION, POWDER, LYOPHILIZED, FOR SOLUTION INTRAVENOUS at 07:22

## 2023-07-06 RX ADMIN — PIPERACILLIN AND TAZOBACTAM 3375 MG: 3; .375 INJECTION, POWDER, LYOPHILIZED, FOR SOLUTION INTRAVENOUS at 15:35

## 2023-07-06 RX ADMIN — HYDROMORPHONE HYDROCHLORIDE 1 MG: 1 INJECTION, SOLUTION INTRAMUSCULAR; INTRAVENOUS; SUBCUTANEOUS at 10:25

## 2023-07-06 RX ADMIN — PIPERACILLIN AND TAZOBACTAM 3375 MG: 3; .375 INJECTION, POWDER, LYOPHILIZED, FOR SOLUTION INTRAVENOUS at 23:59

## 2023-07-06 RX ADMIN — HYDROMORPHONE HYDROCHLORIDE 1 MG: 1 INJECTION, SOLUTION INTRAMUSCULAR; INTRAVENOUS; SUBCUTANEOUS at 23:58

## 2023-07-06 RX ADMIN — HYDROMORPHONE HYDROCHLORIDE 1 MG: 1 INJECTION, SOLUTION INTRAMUSCULAR; INTRAVENOUS; SUBCUTANEOUS at 15:32

## 2023-07-06 RX ADMIN — METOPROLOL TARTRATE 5 MG: 1 INJECTION, SOLUTION INTRAVENOUS at 00:25

## 2023-07-06 RX ADMIN — METOPROLOL TARTRATE 5 MG: 1 INJECTION, SOLUTION INTRAVENOUS at 08:39

## 2023-07-06 RX ADMIN — PIPERACILLIN AND TAZOBACTAM 3375 MG: 3; .375 INJECTION, POWDER, LYOPHILIZED, FOR SOLUTION INTRAVENOUS at 00:41

## 2023-07-06 ASSESSMENT — PAIN SCALES - GENERAL
PAINLEVEL_OUTOF10: 7
PAINLEVEL_OUTOF10: 4
PAINLEVEL_OUTOF10: 8
PAINLEVEL_OUTOF10: 7
PAINLEVEL_OUTOF10: 0
PAINLEVEL_OUTOF10: 7
PAINLEVEL_OUTOF10: 4
PAINLEVEL_OUTOF10: 8

## 2023-07-06 ASSESSMENT — PAIN - FUNCTIONAL ASSESSMENT
PAIN_FUNCTIONAL_ASSESSMENT: ACTIVITIES ARE NOT PREVENTED

## 2023-07-06 ASSESSMENT — PAIN SCALES - WONG BAKER
WONGBAKER_NUMERICALRESPONSE: 2
WONGBAKER_NUMERICALRESPONSE: 0
WONGBAKER_NUMERICALRESPONSE: 2
WONGBAKER_NUMERICALRESPONSE: 0
WONGBAKER_NUMERICALRESPONSE: 2
WONGBAKER_NUMERICALRESPONSE: 0

## 2023-07-06 ASSESSMENT — PAIN DESCRIPTION - LOCATION
LOCATION: BACK
LOCATION: BACK;SHOULDER

## 2023-07-06 ASSESSMENT — PAIN DESCRIPTION - DESCRIPTORS
DESCRIPTORS: ACHING;DULL
DESCRIPTORS: ACHING;DULL
DESCRIPTORS: DULL;GNAWING;SHARP
DESCRIPTORS: ACHING;STABBING
DESCRIPTORS: SHARP

## 2023-07-06 ASSESSMENT — PAIN DESCRIPTION - ORIENTATION
ORIENTATION: MID
ORIENTATION: MID;LOWER
ORIENTATION: RIGHT

## 2023-07-06 NOTE — PROGRESS NOTES
rounding on PCU. Assessment: Patient is waiting to get to [facility] for a back pain stimulator, but he cannot do that because of his present stomach problem. He is a little frustrated because he badly wants that stimulator. Intervention: Engaged in conversation and prayer. Patient expressed gratitude for visit and offer of continued prayer. Plan: Chaplains are available 24/7 to help with spiritual and emotional concerns.         07/06/23 1109   Encounter Summary   Encounter Overview/Reason  Volunteer Encounter   Service Provided For: Patient   Referral/Consult From:  [address] Family members;Spouse   Last Encounter  07/06/23   Complexity of Encounter Low   Begin Time 1052   End Time  1114   Total Time Calculated 22 min   Spiritual/Emotional needs   Type Spiritual Support   Assessment/Intervention/Outcome   Assessment Calm   Intervention Active listening;Prayer (assurance of)/[place];Sustaining Presence/Ministry of presence   Outcome Expressed Gratitude

## 2023-07-06 NOTE — PROGRESS NOTES
[facility] Gunderson[facility]. Epigastric pain [R10.13]  Bronchiectasis without complication ([address]) [Q59.2]  Abdominal pain, unspecified abdominal location [R10.9] . Vitals:    07/06/23 0045   BP:    Pulse: 72   Resp: 16   Temp:    SpO2: 97%   . Patients home meds are   Prior to Admission medications    Medication Sig Start Date End Date Taking? Authorizing Provider   dicyclomine (BENTYL) 10 MG capsule take 1 capsule by mouth four times a day if needed for BOWEL CRAMPING 7/5/23   David Smallwood MD   lidocaine (LIDODERM) 5 % apply 1 patch TO THE AFFECTED AREA DAILY.  LEAVE ON FOR 12 HOURS AND THEN OFF FOR 12 HOURS. 6/12/23   Historical Provider, MD   donepezil (ARICEPT) 5 MG tablet Take 1 tablet by mouth nightly  Patient not taking: Reported on 7/5/2023 6/13/23   David Smallwood MD   pantoprazole (PROTONIX) 40 MG tablet take 1 tablet by mouth once daily 5/18/23   David Smallwood MD   atorvastatin (LIPITOR) 40 MG tablet take 1 tablet by mouth once daily 5/18/23   David Smallwood MD   triamcinolone (KENALOG) 0.1 % cream apply topically twice a day 5/1/23   David Smallwood MD   clopidogrel (PLAVIX) 75 MG tablet take 1 tablet by mouth daily 2/23/23   Roberto Watkins MD   metoprolol tartrate (LOPRESSOR) 50 MG tablet take 1 tablet by mouth twice a day 10/31/22   David Smallwood MD   cyclobenzaprine (FLEXERIL) 10 MG tablet Take 1 tablet by mouth 3 times daily as needed for Muscle spasms 8/18/22   David Smallwood MD   levalbuterol [facility]) 45 MCG/ACT inhaler Inhale 1 puff into the lungs every 6 hours as needed for Wheezing or Shortness of Breath 8/18/22 8/18/23  Karuna Grace MD   ondansetron (ZOFRAN) 4 MG tablet take 1 tablet by mouth every 8 hours if needed for nausea and vomiting 3/22/22   PAYTON Bashir - CNP   diclofenac sodium (VOLTAREN) 1 % GEL Apply 4 g topically 4 times daily as needed for Pain 11/22/21   Madina Clipper, DO   HYDROcodone-acetaminophen [facility])

## 2023-07-06 NOTE — PLAN OF CARE
Problem: Gastrointestinal - Adult  Goal: Minimal or absence of nausea and vomiting  Outcome: Progressing     Problem: Infection - Adult  Goal: Absence of infection at discharge  Outcome: Progressing  Goal: Absence of infection during hospitalization  Outcome: Progressing     Problem: Infection - Adult  Goal: Absence of infection during hospitalization  Outcome: Progressing

## 2023-07-06 NOTE — CARE COORDINATION
Case Management Assessment  Initial Evaluation    Date/Time of Evaluation: 7/6/2023 11:46 AM  Assessment Completed by: Agusto Hill RN    If patient is discharged prior to next notation, then this note serves as note for discharge by case management. Patient Name: Betsey Valverde                   YOB: 1945  Diagnosis: Epigastric pain [R10.13]  Bronchiectasis without complication ([address]) [R36.9]  Abdominal pain, unspecified abdominal location [R10.9]                   Date / Time: 7/5/2023  4:56 PM    Patient Admission Status: Inpatient   Readmission Risk (Low < 19, Mod (19-27), High > 27): Readmission Risk Score: 10.4    Current PCP: Mckay Mckeon MD  PCP verified by CM? Yes    Chart Reviewed: Yes      History Provided by: Patient  Patient Orientation: Alert and Oriented    Patient Cognition: Alert    Hospitalization in the last 30 days (Readmission):  No    If yes, Readmission Assessment in  Navigator will be completed. Advance Directives:      Code Status: Full Code   Patient's Primary Decision Maker is: Legal Next of Kin    Primary Decision MakerCkaleb Garcia Spouse - [phone]    Discharge Planning:    Patient lives with: Spouse/Significant Other Type of Home: House  Primary Care Giver: Self  Patient Support Systems include: Spouse/Significant Other   Current Financial resources:    Current community resources: None  Current services prior to admission: None            Current DME:              Type of Home Care services:  None    ADLS  Prior functional level: Independent in ADLs/IADLs  Current functional level: Independent in ADLs/IADLs    PT AM-PAC:   /24  OT AM-PAC:   /24    Family can provide assistance at DC: Yes  Would you like Case Management to discuss the discharge plan with any other family members/significant others, and if so, who?  No  Plans to Return to Present Housing: Yes  Other Identified Issues/Barriers to RETURNING to current housing: none  Potential Assistance

## 2023-07-06 NOTE — PLAN OF CARE
Problem: Discharge Planning  Goal: Discharge to home or other facility with appropriate resources  7/6/2023 1209 by Jluis Baez RN  Outcome: Progressing  Flowsheets (Taken 7/6/2023 0845)  Discharge to home or other facility with appropriate resources: Identify barriers to discharge with patient and caregiver  7/6/2023 0131 by Erin Medina RN  Outcome: Progressing  Flowsheets  Taken 7/5/2023 2344  Discharge to home or other facility with appropriate resources:   Identify barriers to discharge with patient and caregiver   Arrange for needed discharge resources and transportation as appropriate   Identify discharge learning needs (meds, wound care, etc)   Refer to discharge planning if patient needs post-hospital services based on physician order or complex needs related to functional status, cognitive ability or social support system  Taken 7/5/2023 2342  Discharge to home or other facility with appropriate resources:   Arrange for needed discharge resources and transportation as appropriate   Identify barriers to discharge with patient and caregiver   Identify discharge learning needs (meds, wound care, etc)   Refer to discharge planning if patient needs post-hospital services based on physician order or complex needs related to functional status, cognitive ability or social support system     Problem: Gastrointestinal - Adult  Goal: Minimal or absence of nausea and vomiting  7/6/2023 1209 by Jluis Baez RN  Outcome: Progressing  7/6/2023 0131 by Erin Medina RN  Outcome: Progressing     Problem: Infection - Adult  Goal: Absence of infection at discharge  7/6/2023 1209 by Jluis Baez RN  Outcome: Progressing  Flowsheets (Taken 7/6/2023 0845)  Absence of infection at discharge: Assess and monitor for signs and symptoms of infection  7/6/2023 0131 by Erin Medina RN  Outcome: Progressing  Goal: Absence of infection during hospitalization  7/6/2023 1209 by Jluis Baez RN  Outcome:

## 2023-07-06 NOTE — H&P
HOSPITALIST ADMISSION H&P    REASON FOR ADMISSION:  epigastric abdominal pain, abdominal distension, trace free fluid in pelvis  ESTIMATED LENGTH OF STAY:>2 midnights, 3-4 days    ATTENDING/ADMITTING PHYSICIAN: Anthony Garcia MD    HISTORY OF PRESENT ILLNESS:      The patient is a 68 y.o. male patient of Janice Chatman MD who presents from the ER (sent from walk-in) with c/o recurring abdominal pain and bloating. He states this is an ongoing problem for months, but much worse the past couple weeks. The pain is usually in the epigastric region and RUQ, but is sometimes generalized. He denies diarrhea (recently), constipation, fevers, or vomiting. He reports associated acid reflux and fatigue. The pain and bloating occurs after he eats anything and is better if he doesn't eat. He has tried bentyl, norco, and protonix without improvement. He is in the process of getting a spinal stimulator placed at [facility] in [place] for his chronic back pain, but the surgery got rescheduled to 07/14/2023 due to his abdominal pain and bloating. He was hospitalized 03/03/2023-03/05/2023 for pneumoperitoneum of unknown etiology, but thought to possibly be due to perforated gastric ulcer. He has not had any recent endoscopy and is due for a colonoscopy by Dr. Reeta Gitelman. In ER, CT impression: 1. Redemonstration of posterior basal right lower lobe and middle lobe bronchiectasis with interval development of secretions in the lower lobe bronchiectasis. .. 2. Gallbladder distension without cholelithiasis or evidence for cholecystitis. 3. Small hepatic cyst and splenic cyst unchanged. 4. Nonspecific trace amount of pelvic free fluid. Possibly related to occult infectious or inflammatory process. 5. Significantly increased stool throughout the colon would suggest constipation. WBC 4.6, afebrile, lactic acid 0.8. Dr. Reeta Gitelman, general surgery, accepted consult from ER.      Hypertension     Chronic bronchitis and bronchiectasis -- sees  2D ECHO---7.8.2015--NLVSF--mild LVH--akinesis                    moderately small portion of inferobasal wall--                    septum---no significant valvular disease---                   RVSP ~ 30 mm Hg---Grade 1 DD---LVEF ~ 68%      Right groin hematoma   Diabetes Mellitus Type 2  GERD  Hypertension  Hyperlipidemia  Dementia----STM impairment--progressive 4 years  Tobacco abuse--quit--5.6.1987  HEARING IMPAIRMENT  PMH:  chronic bronchitis, seasonal allergies, adenocarcinoma--prostate,               LS--DDD, neurogenic claudication, spinal stenosis--lumbar,             GERD, DJD, mild hearing loss, overactive bladder, enteritis-likely             viral--abdominal pain---7.11.2015,   PSH:   EGD--2002, colonoscopy---2002, radical prostatectomy--2009,               left shoulder reverse arthroplasty, left TKA--2013, tonsillectomy,               left TKA--2013, right TKA--2018, EGD--2002    Allergies:   NKDA         Attending Supervising Physician's Attestation Statement  I performed a history and physical examination on the patient and discussed the management with the nurse practitioner. I reviewed and agree with the findings and plan as documented in her note . Electronically signed by Marjie Homans, MD on 7/6/23 at 12:16 PM EDT     PLAN:    Medications:  Scheduled Meds:  Continuous Infusions:  PRN Meds:    PLAN:    1. Epigastric pain and abdominal distension -- NPO, general surgery consulted, IV zosyn, PPI, IV hydration, pain control and antiemetics prn  2. Hypertension -- IV lopressor while NPO, monitor and treat further prn  3. Chronic bronchitis and bronchiectasis -- RT protocols, mednebs, IS, acapella  4. Diastolic CHF, chronic, stable -- monitor I&O and daily weight  5. Home medications reviewed  6. DVT prophylaxis -- SCDs until cleared by surgery  7.  See orders     Note that over 55 minutes was spent in reviewing and obtaining history, physical examination and evaluation of the patient, placing

## 2023-07-06 NOTE — PLAN OF CARE
Problem: Discharge Planning  Goal: Discharge to home or other facility with appropriate resources  7/6/2023 1950 by Ildefonso Bowers RN  Outcome: Progressing  7/6/2023 1209 by Zohra Coppola RN  Outcome: Progressing  Flowsheets (Taken 7/6/2023 0845)  Discharge to home or other facility with appropriate resources: Identify barriers to discharge with patient and caregiver     Problem: Gastrointestinal - Adult  Goal: Minimal or absence of nausea and vomiting  7/6/2023 1950 by Ildefonso Bowers RN  Outcome: Progressing  7/6/2023 1209 by Zohra Coppola RN  Outcome: Progressing     Problem: Infection - Adult  Goal: Absence of infection at discharge  7/6/2023 1950 by Ildefonso Bowers RN  Outcome: Progressing  7/6/2023 1209 by Zohra Coppola RN  Outcome: Progressing  Flowsheets (Taken 7/6/2023 0845)  Absence of infection at discharge: Assess and monitor for signs and symptoms of infection  Goal: Absence of infection during hospitalization  7/6/2023 1950 by Ildefonso Bowers RN  Outcome: Progressing  7/6/2023 1209 by Zohra Coppola RN  Outcome: Progressing  Flowsheets (Taken 7/6/2023 0845)  Absence of infection during hospitalization: Assess and monitor for signs and symptoms of infection     Problem: Chronic Conditions and Co-morbidities  Goal: Patient's chronic conditions and co-morbidity symptoms are monitored and maintained or improved  7/6/2023 1950 by Ildefonso Bowers RN  Outcome: Progressing  7/6/2023 1209 by Zohra Coppola RN  Outcome: Progressing  Flowsheets  Taken 7/6/2023 0845 by Zohra Coppola RN  Care Plan - Patient's Chronic Conditions and Co-Morbidity Symptoms are Monitored and Maintained or Improved: Monitor and assess patient's chronic conditions and comorbid symptoms for stability, deterioration, or improvement  Taken 7/5/2023 2344 by [facility] - Patient's Chronic Conditions and Co-Morbidity Symptoms are Monitored and Maintained or Improved:   Monitor and assess

## 2023-07-06 NOTE — CARE COORDINATION
DISCHARGE BARRIERS       Reason for Referral:  SW completed a Psychosocial Assessment for evaluation of patient's mental health, social status, and functional capacity within the community. Mellissa Ruiz is a 68 y.o. male admitted due to Epigastric pain. Patient alone. SW provided supportive listening while patient discussed past medical history and events leading up to hospitalization. Mental Status:  Alert, oriented, and engaging during assessment. Decision Making:  Makes own decisions. Family/Social/Home Environment: lives with their spouse    Support: Discussed little social support. Patient discussed having a small family and no children. Current Services:  None. Patient states he is interested in Meals On Wheels. SW provided education and resources. Patient reports he will wait for his wife to contact [facility] to request Meals On Wheels. Current DMEs: None    PCP: Ben Lozano MD and repots no issues affording medication.  status:   None     ADLs and means of transportation: Independent in ADLs prior to hospitalization and able to transport self. Food insecurity or needed financial assistance: Denies any food insecurity or financial concerns at this time. ACP and Code Status:  SW discussed an Advance Directive which included the patient's choices for care and treatment in the case of a health event that adversely affects decision-making abilities. SW provided education and resources. Mellissa Ruiz has no questions at this time and has agreed to keep me up-to-date should anything change. Mellissa Ruiz is a Full Code status and has NO advanced directive - not interested in additional information. Collaborative List of SNF/ECF/HH were provided: offered, declined No discharge order at this time. Anticipated Needs/Discharge Plan:  Spoke with patient/family/representative about discharge plan.  Patient/Family/Representative verbalizes understanding

## 2023-07-06 NOTE — CONSULTS
Patient is not a particularly reliable historian due to memory problems. He is not having pain now. No sign of acute surgical problem. I will complete a more comprehensive consult documentation tomorrow.

## 2023-07-06 NOTE — TELEPHONE ENCOUNTER
Pamella at [facility] out of office.  Writer left message with a different patient coordinator there that states she will give doctor advice to the surgeon's team.

## 2023-07-07 VITALS
TEMPERATURE: 98 F | DIASTOLIC BLOOD PRESSURE: 72 MMHG | RESPIRATION RATE: 16 BRPM | HEART RATE: 67 BPM | SYSTOLIC BLOOD PRESSURE: 149 MMHG | OXYGEN SATURATION: 98 % | BODY MASS INDEX: 20.57 KG/M2 | WEIGHT: 128 LBS | HEIGHT: 66 IN

## 2023-07-07 LAB
ANION GAP SERPL CALCULATED.3IONS-SCNC: 11 MMOL/L (ref 9–17)
BASOPHILS # BLD: 0.03 K/UL (ref 0–0.2)
BASOPHILS NFR BLD: 1 % (ref 0–2)
BUN SERPL-MCNC: 13 MG/DL (ref 8–23)
BUN/CREAT SERPL: 17 (ref 9–20)
CALCIUM SERPL-MCNC: 8.9 MG/DL (ref 8.6–10.4)
CHLORIDE SERPL-SCNC: 103 MMOL/L (ref 98–107)
CO2 SERPL-SCNC: 24 MMOL/L (ref 20–31)
CREAT SERPL-MCNC: 0.76 MG/DL (ref 0.7–1.2)
EOSINOPHIL # BLD: 0.35 K/UL (ref 0–0.44)
EOSINOPHILS RELATIVE PERCENT: 8 % (ref 1–4)
ERYTHROCYTE [DISTWIDTH] IN BLOOD BY AUTOMATED COUNT: 12.4 % (ref 11.8–14.4)
GFR SERPL CREATININE-BSD FRML MDRD: >60 ML/MIN/1.73M2
GLUCOSE SERPL-MCNC: 79 MG/DL (ref 70–99)
HCT VFR BLD AUTO: 36.1 % (ref 40.7–50.3)
HGB BLD-MCNC: 12.5 G/DL (ref 13–17)
IMM GRANULOCYTES # BLD AUTO: <0.03 K/UL (ref 0–0.3)
IMM GRANULOCYTES NFR BLD: 0 %
LYMPHOCYTES # BLD: 19 % (ref 24–43)
LYMPHOCYTES NFR BLD: 0.85 K/UL (ref 1.1–3.7)
MCH RBC QN AUTO: 29.8 PG (ref 25.2–33.5)
MCHC RBC AUTO-ENTMCNC: 34.6 G/DL (ref 25.2–33.5)
MCV RBC AUTO: 86 FL (ref 82.6–102.9)
MONOCYTES NFR BLD: 0.54 K/UL (ref 0.1–1.2)
MONOCYTES NFR BLD: 12 % (ref 3–12)
NEUTROPHILS NFR BLD: 60 % (ref 36–65)
NEUTS SEG NFR BLD: 2.64 K/UL (ref 1.5–8.1)
NRBC BLD-RTO: 0 PER 100 WBC
PLATELET # BLD AUTO: 243 K/UL (ref 138–453)
PMV BLD AUTO: 8.9 FL (ref 8.1–13.5)
POTASSIUM SERPL-SCNC: 4.1 MMOL/L (ref 3.7–5.3)
RBC # BLD AUTO: 4.2 M/UL (ref 4.21–5.77)
SODIUM SERPL-SCNC: 138 MMOL/L (ref 135–144)
WBC OTHER # BLD: 4.4 K/UL (ref 3.5–11.3)

## 2023-07-07 PROCEDURE — 85027 COMPLETE CBC AUTOMATED: CPT

## 2023-07-07 PROCEDURE — 99238 HOSP IP/OBS DSCHRG MGMT 30/<: CPT | Performed by: INTERNAL MEDICINE

## 2023-07-07 PROCEDURE — 36415 COLL VENOUS BLD VENIPUNCTURE: CPT

## 2023-07-07 PROCEDURE — 6360000002 HC RX W HCPCS: Performed by: NURSE PRACTITIONER

## 2023-07-07 PROCEDURE — 6370000000 HC RX 637 (ALT 250 FOR IP): Performed by: NURSE PRACTITIONER

## 2023-07-07 PROCEDURE — 99222 1ST HOSP IP/OBS MODERATE 55: CPT | Performed by: SURGERY

## 2023-07-07 PROCEDURE — 80048 BASIC METABOLIC PNL TOTAL CA: CPT

## 2023-07-07 PROCEDURE — 2580000003 HC RX 258: Performed by: NURSE PRACTITIONER

## 2023-07-07 RX ORDER — LACTULOSE 10 G/15ML
20 SOLUTION ORAL 3 TIMES DAILY PRN
Qty: 900 ML | Refills: 0 | Status: SHIPPED | OUTPATIENT
Start: 2023-07-07 | End: 2023-07-17

## 2023-07-07 RX ORDER — HYDROCODONE BITARTRATE AND ACETAMINOPHEN 5; 325 MG/1; MG/1
1 TABLET ORAL EVERY 4 HOURS PRN
Status: DISCONTINUED | OUTPATIENT
Start: 2023-07-07 | End: 2023-07-07 | Stop reason: HOSPADM

## 2023-07-07 RX ORDER — LANOLIN ALCOHOL/MO/W.PET/CERES
3 CREAM (GRAM) TOPICAL NIGHTLY PRN
Status: DISCONTINUED | OUTPATIENT
Start: 2023-07-07 | End: 2023-07-07 | Stop reason: HOSPADM

## 2023-07-07 RX ORDER — PANTOPRAZOLE SODIUM 40 MG/1
40 TABLET, DELAYED RELEASE ORAL DAILY
Status: DISCONTINUED | OUTPATIENT
Start: 2023-07-07 | End: 2023-07-07 | Stop reason: HOSPADM

## 2023-07-07 RX ORDER — ATORVASTATIN CALCIUM 40 MG/1
40 TABLET, FILM COATED ORAL DAILY
Status: DISCONTINUED | OUTPATIENT
Start: 2023-07-07 | End: 2023-07-07 | Stop reason: HOSPADM

## 2023-07-07 RX ORDER — HYDROCODONE BITARTRATE AND ACETAMINOPHEN 5; 325 MG/1; MG/1
2 TABLET ORAL EVERY 4 HOURS PRN
Status: DISCONTINUED | OUTPATIENT
Start: 2023-07-07 | End: 2023-07-07 | Stop reason: HOSPADM

## 2023-07-07 RX ORDER — METOPROLOL TARTRATE 50 MG/1
50 TABLET, FILM COATED ORAL 2 TIMES DAILY
Status: DISCONTINUED | OUTPATIENT
Start: 2023-07-07 | End: 2023-07-07 | Stop reason: HOSPADM

## 2023-07-07 RX ORDER — CLOPIDOGREL BISULFATE 75 MG/1
75 TABLET ORAL DAILY
Status: DISCONTINUED | OUTPATIENT
Start: 2023-07-07 | End: 2023-07-07 | Stop reason: HOSPADM

## 2023-07-07 RX ADMIN — SODIUM CHLORIDE: 9 INJECTION, SOLUTION INTRAVENOUS at 05:29

## 2023-07-07 RX ADMIN — CLOPIDOGREL BISULFATE 75 MG: 75 TABLET ORAL at 08:46

## 2023-07-07 RX ADMIN — METOPROLOL TARTRATE 50 MG: 50 TABLET ORAL at 01:35

## 2023-07-07 RX ADMIN — HYDROCODONE BITARTRATE AND ACETAMINOPHEN 2 TABLET: 5; 325 TABLET ORAL at 10:20

## 2023-07-07 RX ADMIN — PIPERACILLIN AND TAZOBACTAM 3375 MG: 3; .375 INJECTION, POWDER, LYOPHILIZED, FOR SOLUTION INTRAVENOUS at 08:40

## 2023-07-07 RX ADMIN — HYDROCODONE BITARTRATE AND ACETAMINOPHEN 2 TABLET: 5; 325 TABLET ORAL at 01:36

## 2023-07-07 RX ADMIN — ATORVASTATIN CALCIUM 40 MG: 40 TABLET, FILM COATED ORAL at 08:46

## 2023-07-07 RX ADMIN — MELATONIN 3 MG: 3 TAB ORAL at 01:36

## 2023-07-07 RX ADMIN — PANTOPRAZOLE SODIUM 40 MG: 40 TABLET, DELAYED RELEASE ORAL at 08:46

## 2023-07-07 RX ADMIN — METOPROLOL TARTRATE 50 MG: 50 TABLET ORAL at 10:15

## 2023-07-07 ASSESSMENT — PAIN DESCRIPTION - DESCRIPTORS
DESCRIPTORS: SHARP
DESCRIPTORS: ACHING;THROBBING

## 2023-07-07 ASSESSMENT — ENCOUNTER SYMPTOMS
SHORTNESS OF BREATH: 0
APNEA: 0
CHEST TIGHTNESS: 0
COUGH: 0
VOICE CHANGE: 0
CHOKING: 0
BACK PAIN: 1
BLOOD IN STOOL: 0
RECTAL PAIN: 0
ANAL BLEEDING: 0
ABDOMINAL DISTENTION: 1
ABDOMINAL PAIN: 1
DIARRHEA: 0
VOMITING: 0
TROUBLE SWALLOWING: 0
CONSTIPATION: 0
SORE THROAT: 0
NAUSEA: 0

## 2023-07-07 ASSESSMENT — PAIN DESCRIPTION - LOCATION
LOCATION: SHOULDER
LOCATION: BACK

## 2023-07-07 ASSESSMENT — PAIN SCALES - GENERAL
PAINLEVEL_OUTOF10: 4
PAINLEVEL_OUTOF10: 4
PAINLEVEL_OUTOF10: 8
PAINLEVEL_OUTOF10: 7

## 2023-07-07 ASSESSMENT — PAIN SCALES - WONG BAKER
WONGBAKER_NUMERICALRESPONSE: 4
WONGBAKER_NUMERICALRESPONSE: 2

## 2023-07-07 ASSESSMENT — PAIN - FUNCTIONAL ASSESSMENT: PAIN_FUNCTIONAL_ASSESSMENT: ACTIVITIES ARE NOT PREVENTED

## 2023-07-07 ASSESSMENT — PAIN DESCRIPTION - ORIENTATION
ORIENTATION: LOWER
ORIENTATION: RIGHT

## 2023-07-07 NOTE — PROGRESS NOTES
CLINICAL PHARMACY NOTE: MEDS TO BEDS    Total # of Prescriptions Filled: 1   The following medications were delivered to the patient:  Lactulose 10gm/15ml    Additional Documentation:

## 2023-07-07 NOTE — PLAN OF CARE
Problem: Discharge Planning  Goal: Discharge to home or other facility with appropriate resources  Outcome: Progressing     Problem: Gastrointestinal - Adult  Goal: Minimal or absence of nausea and vomiting  Outcome: Progressing     Problem: Infection - Adult  Goal: Absence of infection at discharge  Outcome: Progressing  Goal: Absence of infection during hospitalization  Outcome: Progressing     Problem: Chronic Conditions and Co-morbidities  Goal: Patient's chronic conditions and co-morbidity symptoms are monitored and maintained or improved  Outcome: Progressing     Problem: Pain  Goal: Verbalizes/displays adequate comfort level or baseline comfort level  Outcome: Progressing

## 2023-07-07 NOTE — CONSULTS
Consulted regarding abdominal pain. Patient presented to the ED 7/5/23 with complaints of abdominal p ain and bloating. He repeatedly mention he has severe \"gas\", in this case meaning he feels bloated. I the time I saw him he denied any abdominal pain, nausea or vomiting. He was not feeling bloated a that time. He was hungry. No nausea, vomiting, fever or chills. Patents stated he has short term memory problems. It is notable he was admitted in March with abdominal pain and free air. By the time I saw he his pain was gone and he was not tender. Presumptive diagnosis of a perforated ulcer was made. He was suppose to follow to get and outpatient EGD but that never happened. He denies fever, chills, weight loss. He has severe back pain, and is scheduled to have a spinal cord stimulator placed. He is concerned his abdominal symptoms will cause the procedure to be cancelled/postponed. This happened to him previous. Past Medical History:   Diagnosis Date    Bronchiectasis with acute lower respiratory infection ([address]) 7/1/2020    Chronic bronchitis (HCC)     DJD (degenerative joint disease)     Enteritis 7/11/2015    GERD (gastroesophageal reflux disease)     Hearing loss     Mild    Hyperlipidemia     Hypertension     NSTEMI (non-ST elevated myocardial infarction) ([address]) 8/5/2015    Osteoarthritis of left knee     Overactive bladder     Prostate cancer ([address])     Status post radical prostatectomy in 2009. Continues to follow with Dr. Edgar Alba every 6 months.     Seasonal allergies     Spondylosis     Type 2 diabetes mellitus without complication ([address]) 50/85/7912    Type II or unspecified type diabetes mellitus without mention of complication, not stated as uncontrolled     Diet controlled     Past Surgical History:   Procedure Laterality Date    CARDIAC SURGERY      COLONOSCOPY  03/15/2002    Dr. Katie Mckoy      july 2015 drug-eluting stents placed 2 to the RCA and

## 2023-07-07 NOTE — DISCHARGE INSTR - DIET

## 2023-07-07 NOTE — DISCHARGE INSTRUCTIONS
Follow up with Dr. Amara Garcia in 1 week    Follow up with Surgery in 1 week to discuss scheduling an EGD    Do not eat solid food for 2 days prior to your pain stimulator insertion procedure to prevent constipation.

## 2023-07-07 NOTE — PROGRESS NOTES
Hospitalist Progress Note    Patient:  Trip Kim     YOB: 1945    MRN: [id number]   Admit date: 7/5/2023     Acct: [de-identified]     PCP: Tiffani Titus MD    CC--Interval History:   Abdominal pain--constipation due to narcotics for back pain----responded to bowel regimen---home--7.7.2023    Back pain to have spinal stimulator---7.14.2023    CHF--ASCVD--stable    See note below    All other ROS negative except noted in HPI    Diet:  ADULT DIET; Regular; 4 carb choices (60 gm/meal);  Low Fat/Low Chol/High Fiber/2 gm Na    Medications:  Scheduled Meds:   atorvastatin  40 mg Oral Daily    clopidogrel  75 mg Oral Daily    metoprolol tartrate  50 mg Oral BID    pantoprazole  40 mg Oral Daily    lactulose  20 g Oral TID    lidocaine  1 patch Topical Daily    piperacillin-tazobactam  3,375 mg IntraVENous q8h    sodium chloride flush  5-40 mL IntraVENous 2 times per day     Continuous Infusions:   sodium chloride 75 mL/hr at 07/07/23 0532    sodium chloride       PRN Meds:HYDROcodone 5 mg - acetaminophen **OR** HYDROcodone 5 mg - acetaminophen, HYDROmorphone, melatonin, sodium chloride, albuterol, sodium chloride nebulizer, sodium chloride flush, sodium chloride, ondansetron **OR** ondansetron, acetaminophen **OR** acetaminophen    Objective:  Labs:  CBC with Differential:    Lab Results   Component Value Date/Time    WBC 4.4 07/07/2023 04:48 AM    RBC 4.20 07/07/2023 04:48 AM    HGB 12.5 07/07/2023 04:48 AM    HCT 36.1 07/07/2023 04:48 AM     07/07/2023 04:48 AM    MCV 86.0 07/07/2023 04:48 AM    MCH 29.8 07/07/2023 04:48 AM    MCHC 34.6 07/07/2023 04:48 AM    RDW 12.4 07/07/2023 04:48 AM    LYMPHOPCT 19 07/07/2023 04:48 AM    MONOPCT 12 07/07/2023 04:48 AM    BASOPCT 1 07/07/2023 04:48 AM    MONOSABS 0.54 07/07/2023 04:48 AM    LYMPHSABS 0.85 07/07/2023 04:48 AM    EOSABS 0.35 07/07/2023 04:48 AM    BASOSABS 0.03 07/07/2023 04:48 AM    DIFFTYPE NOT REPORTED 03/03/2020 07:08 PM     BMP:    Lab

## 2023-07-08 NOTE — DISCHARGE SUMMARY
[address]                 [facility], [address]                               DISCHARGE SUMMARY    PATIENT NAME: Betsey Valverde                    :        1945  MED REC NO:   [id number]                             ROOM:       [id number]  ACCOUNT NO:   [de-identified]                           ADMIT DATE: 2023  PROVIDER:     Aziza Salter. Kim Hoover MD                  DISCHARGE DATE:  2023    ATTENDING PHYSICIAN OF HOSPITALIZATION:  Missy Shepard MD    PERSONAL PHYSICIAN:  Mckay Mckeon, Internal Medicine, [facility]. The patient has seen Liz Campo, General Surgery. DIAGNOSES:  1.  Epigastric abdominal pain, 2023, abdominal distention, trace  free fluid in pelvis, ongoing for months, pain whenever he eats,  constipation, most likely secondary to the patient's narcotic  utilization for his chronic back pain. 2.  Chronic back pain, to have spinal stimulator placed at [facility] in [place], [place], 2023. CT abdomen and pelvis,  2023, re-demonstration of posterior basal right lower lobe, right  middle lobe bronchiectasis, secretions in the right lower lobe  bronchiectasis, gallbladder distention without cholecystitis or  chololithiasis, small hepatic cyst and splenic cyst, increased stool  throughout the colon suggesting constipation. Chest x-ray, 2023,  chronic changes of bronchiectasis and atelectasis. EKG, 2023,  sinus bradycardia, rate 57, with old inferior infarction. EKG,  2023, sinus bradycardia, rate 52, old inferior infarction, chronic  bronchitis, bronchiectasis. 3.  Prior history of pneumoperitoneum etiology uncertain with a possible  perforated ulcer on 2003, to 2023.  4.  Congestive heart failure, chronic diastolic, coronary artery  disease.   A 2D echo, 2015, normal chambers, normal left  ventricular systolic function, basal and inferior wall personal representatives were specifically advised  the only medications to be taken are those set forth in the discharge  orders and no other medications should be taken. Any prior medications  not on the discharge orders are specifically discontinued.         Ale Pond MD    D: 07/07/2023 14:45:42       T: 07/07/2023 14:52:53     /[email or web address]  Job#: [id number]     Doc#: [id number]    CC:

## 2023-07-10 ENCOUNTER — TELEPHONE (OUTPATIENT)
Dept: INTERNAL MEDICINE | Age: 78
End: 2023-07-10

## 2023-07-10 NOTE — TELEPHONE ENCOUNTER
Care Transitions Initial Follow Up Call    Outreach made within 2 business days of discharge: Yes    Patient: Nick Orozco Patient : 1945   MRN: [id number]  Reason for Admission: There are no discharge diagnoses documented for the most recent discharge. Discharge Date: 23       Spoke with: Antonio Alvarenga     Discharge department/facility: [facility] Interactive Patient Contact:  Was patient able to fill all prescriptions: Yes  Was patient instructed to bring all medications to the follow-up visit: Yes  Is patient taking all medications as directed in the discharge summary?  Yes  Does patient understand their discharge instructions: Yes  Does patient have questions or concerns that need addressed prior to 7-14 day follow up office visit: yes -  4 pm    Scheduled appointment with PCP within 7-14 days    Follow Up  Future Appointments   Date Time Provider [address] [place] [address]   2023  3:30 PM Kamron Rivero [facility] [id number] [facility]   10/2/2023  2:00 PM DO LANA Kuhn DP   2023  1:00 PM [facility]   5/15/2024  1:00 PM Jonny Hughes MD [facility]       Valerie Mena MA

## 2023-07-11 ENCOUNTER — INITIAL CONSULT (OUTPATIENT)
Dept: SURGERY | Age: 78
End: 2023-07-11
Payer: MEDICARE

## 2023-07-11 VITALS
OXYGEN SATURATION: 97 % | HEIGHT: 66 IN | DIASTOLIC BLOOD PRESSURE: 82 MMHG | HEART RATE: 67 BPM | BODY MASS INDEX: 21.21 KG/M2 | WEIGHT: 132 LBS | SYSTOLIC BLOOD PRESSURE: 138 MMHG | TEMPERATURE: 98.9 F

## 2023-07-11 DIAGNOSIS — R10.84 GENERALIZED ABDOMINAL PAIN: Primary | ICD-10-CM

## 2023-07-11 DIAGNOSIS — Z87.19 HISTORY OF COMMON DUODENAL ULCER: ICD-10-CM

## 2023-07-11 DIAGNOSIS — R10.13 EPIGASTRIC PAIN: ICD-10-CM

## 2023-07-11 PROCEDURE — G8427 DOCREV CUR MEDS BY ELIG CLIN: HCPCS | Performed by: SURGERY

## 2023-07-11 PROCEDURE — 99215 OFFICE O/P EST HI 40 MIN: CPT | Performed by: SURGERY

## 2023-07-11 PROCEDURE — 3079F DIAST BP 80-89 MM HG: CPT | Performed by: SURGERY

## 2023-07-11 PROCEDURE — G8420 CALC BMI NORM PARAMETERS: HCPCS | Performed by: SURGERY

## 2023-07-11 PROCEDURE — 1111F DSCHRG MED/CURRENT MED MERGE: CPT | Performed by: SURGERY

## 2023-07-11 PROCEDURE — 1036F TOBACCO NON-USER: CPT | Performed by: SURGERY

## 2023-07-11 PROCEDURE — 1123F ACP DISCUSS/DSCN MKR DOCD: CPT | Performed by: SURGERY

## 2023-07-11 PROCEDURE — 3075F SYST BP GE 130 - 139MM HG: CPT | Performed by: SURGERY

## 2023-07-11 PROCEDURE — 99213 OFFICE O/P EST LOW 20 MIN: CPT | Performed by: SURGERY

## 2023-07-11 NOTE — PROGRESS NOTES
tender     Neck: Supple, no significant adenopathy. No carotid bruits, thyroid normal size and no masses    Chest: Barrel chest, some wheezing    Heart: Normal rate, regular rhythm, no murmurs    Abdomen: Soft, positive bowel sounds, non tender, non distended, no masses, no hernias, no HSM, no bruits. Neuro: Normal speech, motor/sensory grossly normal bilateral    MSK: Has difficulty walking. ASSESSMENT:  1) Abdominal Pain  2) History of Perforated Ulcer    PLAN:  1) EGD - Risks and benefits of EGD (upper G.I. Endoscopy) were discussed with Chris Camarillo. In particular I discussed the nature and limitations of the procedure. I also discussed the risks and consequences of reactions to the sedatives, bleeding and perforation. Alternate ways of evaluating the upper g.i tract were also discussed including barium swallow and CT scan were discussed. he was also given the opportunity to have any questions answered, and encouraged to call the office with additional issues.      Electronically signed by Bhupendra Choi MD on 7/11/2023 at 3:57 PM

## 2023-07-13 ENCOUNTER — OFFICE VISIT (OUTPATIENT)
Dept: INTERNAL MEDICINE | Age: 78
End: 2023-07-13
Payer: MEDICARE

## 2023-07-13 VITALS
WEIGHT: 132 LBS | OXYGEN SATURATION: 96 % | BODY MASS INDEX: 21.21 KG/M2 | HEART RATE: 65 BPM | SYSTOLIC BLOOD PRESSURE: 116 MMHG | RESPIRATION RATE: 16 BRPM | DIASTOLIC BLOOD PRESSURE: 78 MMHG | HEIGHT: 66 IN

## 2023-07-13 DIAGNOSIS — G89.29 CHRONIC MIDLINE LOW BACK PAIN WITH BILATERAL SCIATICA: Primary | ICD-10-CM

## 2023-07-13 DIAGNOSIS — M54.41 CHRONIC MIDLINE LOW BACK PAIN WITH BILATERAL SCIATICA: Primary | ICD-10-CM

## 2023-07-13 DIAGNOSIS — M54.42 CHRONIC MIDLINE LOW BACK PAIN WITH BILATERAL SCIATICA: Primary | ICD-10-CM

## 2023-07-13 DIAGNOSIS — I10 ESSENTIAL HYPERTENSION: ICD-10-CM

## 2023-07-13 DIAGNOSIS — E11.9 TYPE 2 DIABETES MELLITUS WITHOUT COMPLICATION, WITHOUT LONG-TERM CURRENT USE OF INSULIN (HCC): ICD-10-CM

## 2023-07-13 PROCEDURE — 99212 OFFICE O/P EST SF 10 MIN: CPT | Performed by: INTERNAL MEDICINE

## 2023-07-13 PROCEDURE — 3078F DIAST BP <80 MM HG: CPT | Performed by: INTERNAL MEDICINE

## 2023-07-13 PROCEDURE — 3044F HG A1C LEVEL LT 7.0%: CPT | Performed by: INTERNAL MEDICINE

## 2023-07-13 PROCEDURE — 1111F DSCHRG MED/CURRENT MED MERGE: CPT | Performed by: INTERNAL MEDICINE

## 2023-07-13 PROCEDURE — G8420 CALC BMI NORM PARAMETERS: HCPCS | Performed by: INTERNAL MEDICINE

## 2023-07-13 PROCEDURE — 99214 OFFICE O/P EST MOD 30 MIN: CPT | Performed by: INTERNAL MEDICINE

## 2023-07-13 PROCEDURE — 1036F TOBACCO NON-USER: CPT | Performed by: INTERNAL MEDICINE

## 2023-07-13 PROCEDURE — G8427 DOCREV CUR MEDS BY ELIG CLIN: HCPCS | Performed by: INTERNAL MEDICINE

## 2023-07-13 PROCEDURE — 3074F SYST BP LT 130 MM HG: CPT | Performed by: INTERNAL MEDICINE

## 2023-07-13 PROCEDURE — 1123F ACP DISCUSS/DSCN MKR DOCD: CPT | Performed by: INTERNAL MEDICINE

## 2023-07-13 ASSESSMENT — ENCOUNTER SYMPTOMS
VOMITING: 0
DIARRHEA: 0
NAUSEA: 0
CONSTIPATION: 0
BLOOD IN STOOL: 0
BACK PAIN: 1
COUGH: 0
ABDOMINAL PAIN: 0
EYE PAIN: 0
SHORTNESS OF BREATH: 0

## 2023-07-13 NOTE — PROGRESS NOTES
Patientgiven educational materials - see patient instructions. Discussed use, benefit,and side effects of prescribed medications. All patient questions answered. Ptvoiced understanding. Reviewed health maintenance. Instructed to continue currentmedications, diet and exercise. Patient agreed with treatment plan. Follow up asdirected.      Electronically signed by Constance Bautista MD on 7/13/2023 at 3:27 PM

## 2023-07-20 ENCOUNTER — TELEPHONE (OUTPATIENT)
Dept: CARDIOLOGY | Age: 78
End: 2023-07-20

## 2023-07-20 NOTE — TELEPHONE ENCOUNTER
Last Appt:  3/10/2023 Adonis,     8/18/22 with Dr. Sergio Godfrey    Next Appt:   10/2/2023    Request to hold plavix 75 mg on 8/9 for planned Permanent percutaneous implantation of generator and leads with Dr. Roxanna Dowd on 8/17 at [facility]. Fax response to [facility], [facility] [address] [facility], [address]. See form scanned--Fax [phone].

## 2023-07-21 NOTE — TELEPHONE ENCOUNTER
Dr. Luis Fernando Angela will not budge on a 7 day hold of Plavix instead of 5. This needs to be addressed.

## 2023-08-01 ENCOUNTER — TELEPHONE (OUTPATIENT)
Dept: INTERNAL MEDICINE | Age: 78
End: 2023-08-01

## 2023-08-01 ENCOUNTER — OFFICE VISIT (OUTPATIENT)
Dept: INTERNAL MEDICINE | Age: 78
End: 2023-08-01

## 2023-08-01 VITALS
WEIGHT: 134 LBS | HEART RATE: 69 BPM | RESPIRATION RATE: 16 BRPM | DIASTOLIC BLOOD PRESSURE: 72 MMHG | SYSTOLIC BLOOD PRESSURE: 116 MMHG | BODY MASS INDEX: 21.53 KG/M2 | HEIGHT: 66 IN | OXYGEN SATURATION: 96 %

## 2023-08-01 DIAGNOSIS — R11.0 NAUSEA: ICD-10-CM

## 2023-08-01 DIAGNOSIS — E11.9 TYPE 2 DIABETES MELLITUS WITHOUT COMPLICATION, WITHOUT LONG-TERM CURRENT USE OF INSULIN (HCC): ICD-10-CM

## 2023-08-01 DIAGNOSIS — Z01.818 PRE-OP EVALUATION: Primary | ICD-10-CM

## 2023-08-01 DIAGNOSIS — I10 ESSENTIAL HYPERTENSION: ICD-10-CM

## 2023-08-01 DIAGNOSIS — R14.0 ABDOMINAL BLOATING: ICD-10-CM

## 2023-08-01 RX ORDER — ONDANSETRON 4 MG/1
TABLET, FILM COATED ORAL
Qty: 30 TABLET | Refills: 3 | Status: SHIPPED | OUTPATIENT
Start: 2023-08-01

## 2023-08-01 ASSESSMENT — ENCOUNTER SYMPTOMS
EYE PAIN: 0
CONSTIPATION: 0
ABDOMINAL PAIN: 0
COUGH: 0
BACK PAIN: 0
BLOOD IN STOOL: 0
NAUSEA: 0
VOMITING: 0
DIARRHEA: 0
SHORTNESS OF BREATH: 0

## 2023-08-01 NOTE — PROGRESS NOTES
Screening or Monitoring  12/06/2023    Annual Wellness Visit (AWV)  12/14/2023    Depression Screen  02/13/2024    DTaP/Tdap/Td vaccine (2 - Td or Tdap) 11/25/2029    Pneumococcal 65+ years Vaccine  Completed    Hepatitis C screen  Completed    Hepatitis A vaccine  Aged Out    Hib vaccine  Aged Out    Meningococcal (ACWY) vaccine  Aged Out    Diabetic foot exam  Discontinued    A1C test (Diabetic or Prediabetic)  Discontinued    Diabetic Alb to Cr ratio (uACR) test  Discontinued    AAA screen  Discontinued    Colonoscopy  Discontinued       Subjective:      Review of Systems   Constitutional:  Negative for chills and fever. HENT:  Negative for hearing loss. Eyes:  Negative for pain and visual disturbance. Respiratory:  Negative for cough and shortness of breath. Cardiovascular:  Negative for chest pain, palpitations and leg swelling. Gastrointestinal:  Negative for abdominal pain, blood in stool, constipation, diarrhea, nausea and vomiting. Endocrine: Negative for cold intolerance, polydipsia and polyuria. Genitourinary:  Negative for difficulty urinating, dysuria and hematuria. Musculoskeletal:  Negative for arthralgias, back pain, gait problem and neck pain. Skin:  Negative for pallor and rash. Neurological:  Negative for dizziness, weakness, numbness and headaches. Hematological:  Negative for adenopathy. Does not bruise/bleed easily. Psychiatric/Behavioral:  Negative for confusion. The patient is not nervous/anxious. Objective:     Physical Exam  Vitals reviewed. Constitutional:       Appearance: He is well-developed. HENT:      Head: Normocephalic and atraumatic. Eyes:      Pupils: Pupils are equal, round, and reactive to light. Cardiovascular:      Rate and Rhythm: Normal rate and regular rhythm. Heart sounds: No murmur heard. No friction rub. No gallop. Pulmonary:      Effort: Pulmonary effort is normal.      Breath sounds: Normal breath sounds.  No wheezing or

## 2023-08-01 NOTE — TELEPHONE ENCOUNTER
----- Message from Davion Grimes sent at 8/1/2023  1:39 PM EDT -----  Subject: Message to Provider    QUESTIONS  Information for Provider? Pamella from [facility] called stating   that they sent over paper work for an pre-op clearance and would like to   know the status of the paper work.   ---------------------------------------------------------------------------  --------------  [address]  [phone]; OK to leave message on voicemail  ---------------------------------------------------------------------------  --------------  SCRIPT ANSWERS  Relationship to Patient? Covered Entity  Covered Entity Type? Other  Other Covered Entity Type? [facility]  Representative Name?  Pamella

## 2023-08-03 ENCOUNTER — INITIAL CONSULT (OUTPATIENT)
Dept: SURGERY | Age: 78
End: 2023-08-03

## 2023-08-03 VITALS
OXYGEN SATURATION: 98 % | HEART RATE: 67 BPM | WEIGHT: 134 LBS | TEMPERATURE: 98.2 F | DIASTOLIC BLOOD PRESSURE: 80 MMHG | BODY MASS INDEX: 21.53 KG/M2 | SYSTOLIC BLOOD PRESSURE: 130 MMHG | HEIGHT: 66 IN

## 2023-08-03 DIAGNOSIS — R10.9 ABDOMINAL PAIN, UNSPECIFIED ABDOMINAL LOCATION: Primary | ICD-10-CM

## 2023-08-03 PROCEDURE — NBSRV NON-BILLABLE SERVICE: Performed by: SURGERY

## 2023-08-03 NOTE — PROGRESS NOTES
Patient booked with Dr. Shen Knapp by mistake, he already has EGD schedule with Dr. Reeta Gitelman at this time on 8/9/23. Dr. Shen Knapp did not see the patient.

## 2023-08-07 DIAGNOSIS — R11.0 NAUSEA: ICD-10-CM

## 2023-08-07 RX ORDER — DICYCLOMINE HYDROCHLORIDE 10 MG/1
CAPSULE ORAL
Qty: 30 CAPSULE | Refills: 2 | Status: SHIPPED | OUTPATIENT
Start: 2023-08-07

## 2023-08-08 NOTE — H&P
Name:  Trip Kim  Age:  68 y.o.   :  1945    Physician: Doris Guthrie MD       Chief Complaint: Abdominal Pain      HPI: Recently hospitalized, now feeling much better. Patient presented to the ED 23 with complaints of abdominal p ain and bloating. He repeatedly mention he has severe \"gas\", in this case meaning he feels bloated. I the time I saw him he denied any abdominal pain, nausea or vomiting. He was not feeling bloated a that time. He was hungry. No nausea, vomiting, fever or chills. Patents stated he has short term memory problems. It is notable he was admitted in March with abdominal pain and free air. By the time I saw he his pain was gone and he was not tender. Presumptive diagnosis of a perforated ulcer was made. He was suppose to follow to get and outpatient EGD but that never happened. He denies fever, chills, weight loss. He has severe back pain, and is scheduled to have a spinal cord stimulator placed. He is concerned his abdominal symptoms will cause the procedure to be cancelled/postponed. This happened to him previous. MEDICAL HISTORY:    Past Medical History:        Diagnosis Date    Bronchiectasis with acute lower respiratory infection ([address]) 2020    Chronic bronchitis (HCC)     DJD (degenerative joint disease)     Enteritis 2015    GERD (gastroesophageal reflux disease)     Hearing loss     Mild    Hyperlipidemia     Hypertension     NSTEMI (non-ST elevated myocardial infarction) ([address]) 2015    Osteoarthritis of left knee     Overactive bladder     Prostate cancer ([address])     Status post radical prostatectomy in . Continues to follow with Dr. Surya Hernandez every 6 months.     Seasonal allergies     Spondylosis     Type 2 diabetes mellitus without complication ([address])     Type II or unspecified type diabetes mellitus without mention of complication, not stated as uncontrolled     Diet controlled       Past Surgical History: Procedure Laterality Date    CARDIAC SURGERY      COLONOSCOPY  03/15/2002    Dr. Vasquez [place]      2015 drug-eluting stents placed 2 to the RCA and one to the left circumflex    OTHER SURGICAL HISTORY  2009    bilateral L3-4, L4-5, L5-S1 steroid facet injection     TX COLON CA SCRN NOT HI RSK IND N/A 2017    COLONOSCOPY performed by Jackeline Otero MD at [facility] OR  diverticulosis    PROSTATECTOMY  2010    Dr. Geoffrey Brooke Left 2013    TOTAL KNEE ARTHROPLASTY Right 2018    Dr. Dayron Levine [place]/ [facility], [address] ENDOSCOPY  03/15/2002    Dr. Aldo Plascencia       Prior to Admission medications    Medication Sig Start Date End Date Taking? Authorizing Provider   dicyclomine (BENTYL) 10 MG capsule take 1 capsule by mouth four times a day if needed for BOWEL CRAMPING 23   Pign Joel MD   SIMETHICONE PO Take by mouth    Historical Provider, MD   ondansetron (ZOFRAN) 4 MG tablet take 1 tablet by mouth every 8 hours if needed for nausea and vomiting  Patient not taking: Reported on 8/3/2023 8/1/23   Ping Joel MD   Apoaequorin (PREVAGEN) 10 MG CAPS Take 1 capsule by mouth daily Indications: short term memory loss    Historical Provider, MD   lidocaine (LIDODERM) 5 % apply 1 patch TO THE AFFECTED AREA DAILY.  LEAVE ON FOR 12 HOURS AND THEN OFF FOR 12 HOURS. 23   Historical Provider, MD   pantoprazole (PROTONIX) 40 MG tablet take 1 tablet by mouth once daily  Patient not taking: Reported on 8/3/2023 5/18/23   Ping Joel MD   atorvastatin (LIPITOR) 40 MG tablet take 1 tablet by mouth once daily 23   Ping Joel MD   triamcinolone (KENALOG) 0.1 % cream apply topically twice a day 23   Ping Joel MD   clopidogrel (PLAVIX) 75 MG tablet take 1 tablet by mouth daily 23   Dmitri Beck MD   metoprolol tartrate (LOPRESSOR) 50 MG tablet take 1 tablet

## 2023-08-08 NOTE — PROGRESS NOTES
Physician Progress Note      PATIENTZachary Tellez  CSN #:                  [id number]  :                       1945  ADMIT DATE:       2023 4:56 PM  [facility] DATE:        2023 5:08 PM  RESPONDING  PROVIDER #:        Alex Cary MD          QUERY TEXT:    Pt admitted with abdominal pain and noted to have constipation. If possible,   please document in progress notes and discharge summary the relationship, if   any, between abdominal pain and constipation. The medical record reflects the following:  Risk Factors: chronic narcotic use for back pain, GERD  Clinical Indicators:  pain and bloating, worse after eating;  CT A/P:    \"Significantly increased stool throughout the colon would suggest   constipation\"  Per  PN: \"CC--Interval History:   Abdominal   pain--constipation due to narcotics for back pain----responded to bowel   regimen. \"  Treatment: lactulose, protonix, diagnostic testing, surgery consult  Options provided:  -- Abdominal pain due to constipation  -- Abdominal pain unrelated to constipation  -- Other - I will add my own diagnosis  -- Disagree - Not applicable / Not valid  -- Disagree - Clinically unable to determine / Unknown  -- Refer to Clinical Documentation Reviewer    PROVIDER RESPONSE TEXT:    This patient has abdominal pain due to constipation.     Query created by: Yuni Sánchez on 2023 4:50 PM      Electronically signed by:  Alex Cary MD 2023 11:44 AM

## 2023-08-09 ENCOUNTER — HOSPITAL ENCOUNTER (OUTPATIENT)
Age: 78
Setting detail: OUTPATIENT SURGERY
Discharge: HOME | End: 2023-08-09
Attending: SURGERY | Admitting: SURGERY
Payer: MEDICARE

## 2023-08-09 ENCOUNTER — ANESTHESIA EVENT (OUTPATIENT)
Dept: OPERATING ROOM | Age: 78
End: 2023-08-09
Payer: MEDICARE

## 2023-08-09 ENCOUNTER — ANESTHESIA (OUTPATIENT)
Dept: OPERATING ROOM | Age: 78
End: 2023-08-09
Payer: MEDICARE

## 2023-08-09 VITALS
HEART RATE: 57 BPM | DIASTOLIC BLOOD PRESSURE: 76 MMHG | OXYGEN SATURATION: 96 % | WEIGHT: 128.4 LBS | RESPIRATION RATE: 16 BRPM | TEMPERATURE: 96.6 F | BODY MASS INDEX: 20.63 KG/M2 | SYSTOLIC BLOOD PRESSURE: 155 MMHG | HEIGHT: 66 IN

## 2023-08-09 DIAGNOSIS — R10.84 GENERALIZED ABDOMINAL PAIN: ICD-10-CM

## 2023-08-09 DIAGNOSIS — R10.13 EPIGASTRIC PAIN: ICD-10-CM

## 2023-08-09 DIAGNOSIS — Z87.19 HISTORY OF COMMON DUODENAL ULCER: ICD-10-CM

## 2023-08-09 LAB — METER GLUCOSE: 95 MG/DL (ref 75–110)

## 2023-08-09 PROCEDURE — 2709999900 HC NON-CHARGEABLE SUPPLY: Performed by: SURGERY

## 2023-08-09 PROCEDURE — 2500000003 HC RX 250 WO HCPCS: Performed by: NURSE ANESTHETIST, CERTIFIED REGISTERED

## 2023-08-09 PROCEDURE — 43239 EGD BIOPSY SINGLE/MULTIPLE: CPT | Performed by: SURGERY

## 2023-08-09 PROCEDURE — 2580000003 HC RX 258: Performed by: SURGERY

## 2023-08-09 PROCEDURE — 82947 ASSAY GLUCOSE BLOOD QUANT: CPT

## 2023-08-09 PROCEDURE — 7100000011 HC PHASE II RECOVERY - ADDTL 15 MIN: Performed by: SURGERY

## 2023-08-09 PROCEDURE — 88305 TISSUE EXAM BY PATHOLOGIST: CPT

## 2023-08-09 PROCEDURE — 3700000000 HC ANESTHESIA ATTENDED CARE: Performed by: SURGERY

## 2023-08-09 PROCEDURE — 7100000010 HC PHASE II RECOVERY - FIRST 15 MIN: Performed by: SURGERY

## 2023-08-09 PROCEDURE — 88342 IMHCHEM/IMCYTCHM 1ST ANTB: CPT

## 2023-08-09 PROCEDURE — 6360000002 HC RX W HCPCS: Performed by: NURSE ANESTHETIST, CERTIFIED REGISTERED

## 2023-08-09 PROCEDURE — 3609012400 HC EGD TRANSORAL BIOPSY SINGLE/MULTIPLE: Performed by: SURGERY

## 2023-08-09 RX ORDER — SODIUM CHLORIDE, SODIUM LACTATE, POTASSIUM CHLORIDE, CALCIUM CHLORIDE 600; 310; 30; 20 MG/100ML; MG/100ML; MG/100ML; MG/100ML
INJECTION, SOLUTION INTRAVENOUS CONTINUOUS
Status: DISCONTINUED | OUTPATIENT
Start: 2023-08-09 | End: 2023-08-09 | Stop reason: HOSPADM

## 2023-08-09 RX ORDER — PROPOFOL 10 MG/ML
INJECTION, EMULSION INTRAVENOUS PRN
Status: DISCONTINUED | OUTPATIENT
Start: 2023-08-09 | End: 2023-08-09 | Stop reason: SDUPTHER

## 2023-08-09 RX ORDER — LIDOCAINE HYDROCHLORIDE 40 MG/ML
INJECTION, SOLUTION RETROBULBAR; TOPICAL PRN
Status: DISCONTINUED | OUTPATIENT
Start: 2023-08-09 | End: 2023-08-09 | Stop reason: SDUPTHER

## 2023-08-09 RX ADMIN — SODIUM CHLORIDE, POTASSIUM CHLORIDE, SODIUM LACTATE AND CALCIUM CHLORIDE: 600; 310; 30; 20 INJECTION, SOLUTION INTRAVENOUS at 10:22

## 2023-08-09 RX ADMIN — PROPOFOL 150 MG: 10 INJECTION, EMULSION INTRAVENOUS at 10:52

## 2023-08-09 RX ADMIN — LIDOCAINE HYDROCHLORIDE 60 MG: 40 INJECTION, SOLUTION RETROBULBAR; TOPICAL at 10:52

## 2023-08-09 ASSESSMENT — PAIN SCALES - GENERAL
PAINLEVEL_OUTOF10: 0

## 2023-08-09 ASSESSMENT — PAIN - FUNCTIONAL ASSESSMENT: PAIN_FUNCTIONAL_ASSESSMENT: 0-10

## 2023-08-09 NOTE — ANESTHESIA POSTPROCEDURE EVALUATION
Department of Anesthesiology  Postprocedure Note    Patient: Conchis Edmond  MRN: [id number]  YOB: 1945  Date of evaluation: 8/9/2023      Procedure Summary     Date: 08/09/23 Room / Location: [facility]    Anesthesia Start: 9444 Anesthesia Stop: 3849    Procedure: EGD BIOPSY (Esophagus) Diagnosis:       Generalized abdominal pain      Epigastric pain      History of common duodenal ulcer      (Generalized abdominal pain [R10.84])      (Epigastric pain [R10.13])      (History of common duodenal ulcer [Z87.19])    Surgeons: Paz Brewer MD Responsible Provider: PAYTON Worley CRNA    Anesthesia Type: general, TIVA ASA Status: 3          Anesthesia Type: No value filed.     Kam Phase I: Kam Score: 10    Kam Phase II:        Anesthesia Post Evaluation    Patient location during evaluation: PACU  Patient participation: complete - patient participated  Level of consciousness: awake and alert  Pain score: 0  Airway patency: patent  Nausea & Vomiting: no nausea and no vomiting  Complications: no  Cardiovascular status: blood pressure returned to baseline and hemodynamically stable  Respiratory status: acceptable  Hydration status: euvolemic

## 2023-08-09 NOTE — ANESTHESIA PRE PROCEDURE
(Current): There were no vitals filed for this visit. BP Readings from Last 3 Encounters:   08/09/23 (!) 155/72   08/03/23 130/80   08/01/23 116/72       NPO Status:                                                                                 BMI:   Wt Readings from Last 3 Encounters:   08/09/23 128 lb 6.4 oz (58.2 kg)   08/03/23 134 lb (60.8 kg)   08/01/23 134 lb (60.8 kg)     There is no height or weight on file to calculate BMI.    CBC:   Lab Results   Component Value Date/Time    WBC 4.4 07/07/2023 04:48 AM    RBC 4.20 07/07/2023 04:48 AM    HGB 12.5 07/07/2023 04:48 AM    HCT 36.1 07/07/2023 04:48 AM    MCV 86.0 07/07/2023 04:48 AM    RDW 12.4 07/07/2023 04:48 AM     07/07/2023 04:48 AM       CMP:   Lab Results   Component Value Date/Time     07/07/2023 04:48 AM    K 4.1 07/07/2023 04:48 AM     07/07/2023 04:48 AM    CO2 24 07/07/2023 04:48 AM    BUN 13 07/07/2023 04:48 AM    CREATININE 0.76 07/07/2023 04:48 AM    GFRAA >60 07/07/2021 09:59 AM    LABGLOM >60 07/07/2023 04:48 AM    GLUCOSE 79 07/07/2023 04:48 AM    PROT 6.7 07/05/2023 05:43 PM    CALCIUM 8.9 07/07/2023 04:48 AM    BILITOT 0.5 07/05/2023 05:43 PM    ALKPHOS 184 07/05/2023 05:43 PM    AST 19 07/05/2023 05:43 PM    ALT 13 07/05/2023 05:43 PM       POC Tests: No results for input(s): POCGLU, POCNA, POCK, POCCL, POCBUN, POCHEMO, POCHCT in the last 72 hours.     Coags:   Lab Results   Component Value Date/Time    PROTIME 10.5 01/08/2018 02:57 PM    INR 1.0 01/08/2018 02:57 PM    APTT 34.5 01/08/2018 02:57 PM       HCG (If Applicable): No results found for: PREGTESTUR, PREGSERUM, HCG, HCGQUANT     ABGs: No results found for: PHART, PO2ART, QSB2AAX, TFE8IOV, BEART, Z4FQQTUP     Type & Screen (If Applicable):  No results found for: [facility]    Anesthesia Evaluation  Patient summary reviewed and Nursing notes reviewed no history of anesthetic complications:   Airway: Mallampati: II  TM

## 2023-08-09 NOTE — OP NOTE
Operative Note      Patient: Zara Garner  YOB: 1945  MRN: [id number]    Date of Procedure: 8/9/2023    Pre-Op Diagnosis Codes:     * Generalized abdominal pain [R10.84]     * Epigastric pain [R10.13]     * History of common duodenal ulcer [Z87.19]    Post-Op Diagnosis:  No ulcer, atrophic gastritis, small hiatal hernia       Procedure(s):  EGD BIOPSY    Surgeon(s):  Tanner Nance MD    Assistant:   * No surgical staff found *    Anesthesia: General    Estimated Blood Loss (mL): Minimal    Complications: None    Specimens:   ID Type Source Tests Collected by Time Destination   A : antrum Tissue Stomach SURGICAL PATHOLOGY aTnner Nance MD 8/9/2023 1056    B : body Tissue Stomach SURGICAL PATHOLOGY Tanner Nance MD 8/9/2023 1057        Implants:  * No implants in log *      Drains: * No LDAs found *    She was brought to the endoscopy area and IV sedation was induced by the CRNA. Scope was put in his mouth down his esophagus into his stomach and duodenum. Esophagus looks fairly normal.  No obvious esophagitis, tumors, stenosis etc.  Looks like he might have some atrophic gastritis. Mild erythema and some flattening of his rugal folds. No liz ulceration was seen. Duodenum looks good. No evidence of any residual duodenal ulcer at this time. Brought back and stomach and retroflexed he does have a small the moderate size hiatal hernia. Biopsies were done the antrum and then the body stomach to look for underlying inflammation H. pylori etc.    Will see what the biopsy results show and make additional treatment recommendations based upon those results.     Electronically signed by Tanner Nance MD on 8/9/2023 at 10:58 AM

## 2023-08-09 NOTE — DISCHARGE INSTRUCTIONS
1) You do not have any residual ulcer. 2) You probably have some gastritis, and with your history of a perforated ulcer you should continue on pantoprazole (Protonix)  3) Biopsies were done to look for problems that might require different or additional treatment. 4) Follow up with me in office if you would like. You will be called with the biopsy results and recommendations. Discharge Instructions for EGD     An EGD or upper GI endoscopy is a visual exam of the lining of the esophagus, stomach (gastric) and duodenum (the first part of the small intestine). An endoscopy is a flexible tube with a light and a viewing device. It allows the doctor to view the inside of the colon through a tiny video camera. EGD is performed for many reasons: unexplained anemia , pain, bleeding, heart burn, among many other reasons. Complications from a EGD are rare. Some possible serious complications include perforated bowel (which might require surgery) and bleeding (which could require blood transfusion ). Minor complications include bloating, gas, and cramping that can last for 1-2 days after the procedure. Because air is put into your upper GI during the procedure, it is normal to pass large amounts of air from your rectum and burp a lot. What You Will Need   Someone to drive you home after the procedure    Steps to [address] when you get home. Because the sedative will make you drowsy, don't drive, operate machinery, or make important decisions the day of the procedure. Feelings of bloating, gas, or cramping may persist for 24 hours. Diet    Try sips of water first. If tolerated, resume regular diet or the diet recommended by your physician. Do not drink alcohol for 24 hours. Physical Activity    You may return to work tomorrow.     Do not drive, operate heavy machinery, or do activities that require coordination or balance until tomorrow  Otherwise, return to your normal routine as

## 2023-08-15 ENCOUNTER — TELEPHONE (OUTPATIENT)
Dept: INTERNAL MEDICINE | Age: 78
End: 2023-08-15

## 2023-08-15 ENCOUNTER — HOSPITAL ENCOUNTER (OUTPATIENT)
Age: 78
Discharge: HOME | End: 2023-08-15
Payer: MEDICARE

## 2023-08-15 LAB
ANION GAP SERPL CALCULATED.3IONS-SCNC: 9 MMOL/L (ref 9–17)
BUN SERPL-MCNC: 19 MG/DL (ref 8–23)
CHLORIDE SERPL-SCNC: 103 MMOL/L (ref 98–107)
CO2 SERPL-SCNC: 30 MMOL/L (ref 20–31)
CREAT SERPL-MCNC: 0.9 MG/DL (ref 0.7–1.2)
ERYTHROCYTE [DISTWIDTH] IN BLOOD BY AUTOMATED COUNT: 12.2 % (ref 11.8–14.4)
GFR SERPL CREATININE-BSD FRML MDRD: >60 ML/MIN/1.73M2
GLUCOSE P FAST SERPL-MCNC: 60 MG/DL (ref 70–99)
HCT VFR BLD AUTO: 41.6 % (ref 40.7–50.3)
HGB BLD-MCNC: 13.9 G/DL (ref 13–17)
MCH RBC QN AUTO: 29.3 PG (ref 25.2–33.5)
MCHC RBC AUTO-ENTMCNC: 33.4 G/DL (ref 25.2–33.5)
MCV RBC AUTO: 87.8 FL (ref 82.6–102.9)
NRBC BLD-RTO: 0 PER 100 WBC
PLATELET # BLD AUTO: 224 K/UL (ref 138–453)
PMV BLD AUTO: 8.8 FL (ref 8.1–13.5)
POTASSIUM SERPL-SCNC: 4.4 MMOL/L (ref 3.7–5.3)
RBC # BLD AUTO: 4.74 M/UL (ref 4.21–5.77)
SODIUM SERPL-SCNC: 142 MMOL/L (ref 135–144)
SURGICAL PATHOLOGY REPORT: NORMAL
WBC OTHER # BLD: 4.2 K/UL (ref 3.5–11.3)

## 2023-08-15 PROCEDURE — 85027 COMPLETE CBC AUTOMATED: CPT

## 2023-08-15 PROCEDURE — 82565 ASSAY OF CREATININE: CPT

## 2023-08-15 PROCEDURE — 36415 COLL VENOUS BLD VENIPUNCTURE: CPT

## 2023-08-15 PROCEDURE — 84520 ASSAY OF UREA NITROGEN: CPT

## 2023-08-15 PROCEDURE — 83036 HEMOGLOBIN GLYCOSYLATED A1C: CPT

## 2023-08-15 PROCEDURE — 82947 ASSAY GLUCOSE BLOOD QUANT: CPT

## 2023-08-15 PROCEDURE — 80051 ELECTROLYTE PANEL: CPT

## 2023-08-15 NOTE — TELEPHONE ENCOUNTER
Lab orders were sent over by [facility] to be done. Patient is going to the lab this morning to get them done. Will fax the new lab results to [facility] once they are all back.

## 2023-08-15 NOTE — TELEPHONE ENCOUNTER
Patient is in reception area. He is scheduled for Spinal chord stimulator on Aug. 17th, this Thursday. He saw you for pre-op on August 1st but surgery had to be cancelled  so labs he did for this surgery in July are no longer acceptable. He states he needs new lab work done, and that if they do not get resluts by the end of the day today, they will have to cancel surgery for Thurday.   Can you place new orders while he is here so he can go to lab now and then we can fax result to [place] [facility]?

## 2023-08-15 NOTE — TELEPHONE ENCOUNTER
All results faxed but the A1c. That one has not come back yet and probably wont be back until tomorrow.

## 2023-08-16 LAB
EST. AVERAGE GLUCOSE BLD GHB EST-MCNC: 111 MG/DL
HBA1C MFR BLD: 5.5 % (ref 4–6)

## 2023-08-18 ENCOUNTER — TELEPHONE (OUTPATIENT)
Dept: SURGERY | Age: 78
End: 2023-08-18

## 2023-08-18 NOTE — TELEPHONE ENCOUNTER
Letter created and mailed to patient at this time with results from recent EGD at [facility] with Dr. Bettye Saldana on 8/9/23. Updated patients history. Results forwarded to PCP.

## 2023-08-21 ENCOUNTER — HOSPITAL ENCOUNTER (OUTPATIENT)
Age: 78
Discharge: HOME | End: 2023-08-21
Payer: MEDICARE

## 2023-08-21 LAB
ANION GAP SERPL CALCULATED.3IONS-SCNC: 7 MMOL/L (ref 9–17)
BUN SERPL-MCNC: 14 MG/DL (ref 8–23)
CHLORIDE SERPL-SCNC: 101 MMOL/L (ref 98–107)
CO2 SERPL-SCNC: 34 MMOL/L (ref 20–31)
CREAT SERPL-MCNC: 0.9 MG/DL (ref 0.7–1.2)
ERYTHROCYTE [DISTWIDTH] IN BLOOD BY AUTOMATED COUNT: 12.2 % (ref 11.8–14.4)
EST. AVERAGE GLUCOSE BLD GHB EST-MCNC: 117 MG/DL
GFR SERPL CREATININE-BSD FRML MDRD: >60 ML/MIN/1.73M2
GLUCOSE P FAST SERPL-MCNC: 93 MG/DL (ref 70–99)
HBA1C MFR BLD: 5.7 % (ref 4–6)
HCT VFR BLD AUTO: 38.2 % (ref 40.7–50.3)
HGB BLD-MCNC: 13 G/DL (ref 13–17)
MCH RBC QN AUTO: 29.7 PG (ref 25.2–33.5)
MCHC RBC AUTO-ENTMCNC: 34 G/DL (ref 25.2–33.5)
MCV RBC AUTO: 87.2 FL (ref 82.6–102.9)
NRBC BLD-RTO: 0 PER 100 WBC
PLATELET # BLD AUTO: 219 K/UL (ref 138–453)
PMV BLD AUTO: 8.7 FL (ref 8.1–13.5)
POTASSIUM SERPL-SCNC: 3.7 MMOL/L (ref 3.7–5.3)
RBC # BLD AUTO: 4.38 M/UL (ref 4.21–5.77)
SODIUM SERPL-SCNC: 142 MMOL/L (ref 135–144)
WBC OTHER # BLD: 4.1 K/UL (ref 3.5–11.3)

## 2023-08-21 PROCEDURE — 36415 COLL VENOUS BLD VENIPUNCTURE: CPT

## 2023-08-21 PROCEDURE — 80051 ELECTROLYTE PANEL: CPT

## 2023-08-21 PROCEDURE — 84520 ASSAY OF UREA NITROGEN: CPT

## 2023-08-21 PROCEDURE — 85027 COMPLETE CBC AUTOMATED: CPT

## 2023-08-21 PROCEDURE — 83036 HEMOGLOBIN GLYCOSYLATED A1C: CPT

## 2023-08-21 PROCEDURE — 82947 ASSAY GLUCOSE BLOOD QUANT: CPT

## 2023-08-21 PROCEDURE — 82565 ASSAY OF CREATININE: CPT

## 2023-08-22 ENCOUNTER — OFFICE VISIT (OUTPATIENT)
Dept: SURGERY | Age: 78
End: 2023-08-22
Payer: MEDICARE

## 2023-08-22 VITALS
SYSTOLIC BLOOD PRESSURE: 144 MMHG | WEIGHT: 133.8 LBS | TEMPERATURE: 98.8 F | OXYGEN SATURATION: 95 % | HEIGHT: 66 IN | HEART RATE: 62 BPM | BODY MASS INDEX: 21.5 KG/M2 | DIASTOLIC BLOOD PRESSURE: 82 MMHG

## 2023-08-22 DIAGNOSIS — R14.0 BLOATING: Primary | ICD-10-CM

## 2023-08-22 PROCEDURE — 1123F ACP DISCUSS/DSCN MKR DOCD: CPT | Performed by: SURGERY

## 2023-08-22 PROCEDURE — G8420 CALC BMI NORM PARAMETERS: HCPCS | Performed by: SURGERY

## 2023-08-22 PROCEDURE — G8427 DOCREV CUR MEDS BY ELIG CLIN: HCPCS | Performed by: SURGERY

## 2023-08-22 PROCEDURE — 3078F DIAST BP <80 MM HG: CPT | Performed by: SURGERY

## 2023-08-22 PROCEDURE — 3077F SYST BP >= 140 MM HG: CPT | Performed by: SURGERY

## 2023-08-22 PROCEDURE — 99214 OFFICE O/P EST MOD 30 MIN: CPT | Performed by: SURGERY

## 2023-08-22 PROCEDURE — 99213 OFFICE O/P EST LOW 20 MIN: CPT | Performed by: SURGERY

## 2023-08-22 PROCEDURE — 1036F TOBACCO NON-USER: CPT | Performed by: SURGERY

## 2023-08-22 NOTE — PROGRESS NOTES
Complains of excessive gas and bloating. Can only eat pan cakes, toast, potatoes. \"Something happened about 3 - 4 moths ago in my digestive system>'    Constipation and diarrhea alternating. Hard stools followed by liquid stools in the same session. Current using a stool softener and Miralax powder. Uses these until he gets diarrhea. Complains of short term memory loss. He does find prunes effective, but he forgets to eat them. He has a routine for him medication. He is also concerned his intolerance to eggs which he has developed over several months. He feels bloated, gassy after eating eggs. IMP/PLAN  1) It seems his primary problems is constipation.  - Miralax and the stool softener he is using trigger significant diarrhea. - Initial discussed try a fiber supplement or Miralax at a reduced dose regularly, but then he told me prunes work really well for him. - But he forgets to eat the prunes. - He has a system with his prescription as to not forget to take them. I suggested he put the prunes next to his medication, even putting individual servings into plastic bags. Marking those bags to help him remember to eat them. 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 I don't have any suggestions other than avoiding eggs. 3) He is concerned this could be his gall bladder. Recent CT did not show gall stones, and he had none on an ultrasound 8 years ago. Will recheck an ultrasound. Today's visit was entirely a counseling visit lasting about 25 minutes.

## 2023-08-22 NOTE — PATIENT INSTRUCTIONS
1) Put your prunes close to your medication, to help you to remember to eat some. You can even put them in a zip-lock back marked with the day to help you remember.

## 2023-08-24 ENCOUNTER — HOSPITAL ENCOUNTER (OUTPATIENT)
Dept: INTERVENTIONAL RADIOLOGY/VASCULAR | Age: 78
Discharge: HOME | End: 2023-08-26
Payer: MEDICARE

## 2023-08-24 DIAGNOSIS — R14.0 BLOATING: ICD-10-CM

## 2023-08-24 PROCEDURE — 76705 ECHO EXAM OF ABDOMEN: CPT

## 2023-09-07 ENCOUNTER — TELEPHONE (OUTPATIENT)
Dept: CARDIOLOGY | Age: 78
End: 2023-09-07

## 2023-09-07 NOTE — TELEPHONE ENCOUNTER
Received cardiac clearance request from [address] requesting clearance for procedure. Please review the request attached as well as EKG and labs sent.      Last Appt:  3/10/2023  Next Appt:   10/2/2023  Med verified in Epic

## 2023-09-08 ENCOUNTER — TELEPHONE (OUTPATIENT)
Dept: INTERNAL MEDICINE | Age: 78
End: 2023-09-08

## 2023-09-08 NOTE — TELEPHONE ENCOUNTER
Bedside report received. Call light and belongings within reach. Bed locked and in lowest position. Alarm and fall precautions in place.    Medical clearance listed in impression section of 8/1/2023 encounter note

## 2023-09-08 NOTE — TELEPHONE ENCOUNTER
Can you please addend patients 8/1/2023 stating that he is medically cleared for his surgery. He still has not had it done due to insurance. I was not going to make him come back in a 3rd time for a pre op appt. We can just addend with todays date stating that he is medically cleared. He is now scheduled for 9/18/2023     He has already been cleared by cardiology.

## 2023-09-09 DIAGNOSIS — R11.0 NAUSEA: ICD-10-CM

## 2023-09-11 RX ORDER — DICYCLOMINE HYDROCHLORIDE 10 MG/1
CAPSULE ORAL
Qty: 120 CAPSULE | Refills: 2 | Status: SHIPPED | OUTPATIENT
Start: 2023-09-11

## 2023-09-11 NOTE — TELEPHONE ENCOUNTER
[facility] called requesting a refill of the below medication which has been pended for you:     Requested Prescriptions     Pending Prescriptions Disp Refills    dicyclomine (BENTYL) 10 MG capsule [Pharmacy Med Name: DICYCLOMINE 10 MG CAPSULE] 30 capsule 2     Sig: take 1 capsule by mouth four times a day if needed       Last Appointment Date: 8/1/2023  Next Appointment Date: 10/2/2023    Allergies   Allergen Reactions    Zonisamide Anxiety     Nerve pain medication

## 2023-09-22 DIAGNOSIS — J47.9 BRONCHIECTASIS WITHOUT COMPLICATION (HCC): ICD-10-CM

## 2023-09-25 RX ORDER — LEVALBUTEROL TARTRATE 45 UG/1
AEROSOL, METERED ORAL
Qty: 15 G | Refills: 5 | Status: SHIPPED | OUTPATIENT
Start: 2023-09-25

## 2023-10-02 ENCOUNTER — OFFICE VISIT (OUTPATIENT)
Dept: INTERNAL MEDICINE | Age: 78
End: 2023-10-02
Payer: MEDICARE

## 2023-10-02 ENCOUNTER — OFFICE VISIT (OUTPATIENT)
Dept: CARDIOLOGY | Age: 78
End: 2023-10-02
Payer: MEDICARE

## 2023-10-02 VITALS
BODY MASS INDEX: 21.86 KG/M2 | SYSTOLIC BLOOD PRESSURE: 138 MMHG | OXYGEN SATURATION: 95 % | HEIGHT: 66 IN | HEART RATE: 64 BPM | DIASTOLIC BLOOD PRESSURE: 70 MMHG | WEIGHT: 136 LBS

## 2023-10-02 VITALS
RESPIRATION RATE: 16 BRPM | HEIGHT: 66 IN | WEIGHT: 136 LBS | DIASTOLIC BLOOD PRESSURE: 70 MMHG | BODY MASS INDEX: 21.86 KG/M2 | HEART RATE: 68 BPM | OXYGEN SATURATION: 96 % | SYSTOLIC BLOOD PRESSURE: 138 MMHG

## 2023-10-02 DIAGNOSIS — E78.2 MIXED HYPERLIPIDEMIA: ICD-10-CM

## 2023-10-02 DIAGNOSIS — Z98.61 CORONARY ANGIOPLASTY STATUS: ICD-10-CM

## 2023-10-02 DIAGNOSIS — I10 ESSENTIAL HYPERTENSION: Primary | ICD-10-CM

## 2023-10-02 DIAGNOSIS — I10 PRIMARY HYPERTENSION: ICD-10-CM

## 2023-10-02 DIAGNOSIS — I10 ESSENTIAL HYPERTENSION: ICD-10-CM

## 2023-10-02 DIAGNOSIS — I25.10 CORONARY ARTERY DISEASE INVOLVING NATIVE CORONARY ARTERY OF NATIVE HEART WITHOUT ANGINA PECTORIS: Primary | ICD-10-CM

## 2023-10-02 DIAGNOSIS — E78.49 OTHER HYPERLIPIDEMIA: ICD-10-CM

## 2023-10-02 DIAGNOSIS — I25.10 ATHEROSCLEROSIS OF NATIVE CORONARY ARTERY OF NATIVE HEART WITHOUT ANGINA PECTORIS: ICD-10-CM

## 2023-10-02 DIAGNOSIS — J42 CHRONIC BRONCHITIS, UNSPECIFIED CHRONIC BRONCHITIS TYPE (HCC): ICD-10-CM

## 2023-10-02 DIAGNOSIS — I50.32 CHRONIC DIASTOLIC CONGESTIVE HEART FAILURE (HCC): ICD-10-CM

## 2023-10-02 DIAGNOSIS — E11.9 TYPE 2 DIABETES MELLITUS WITHOUT COMPLICATION, WITHOUT LONG-TERM CURRENT USE OF INSULIN (HCC): ICD-10-CM

## 2023-10-02 PROCEDURE — G8484 FLU IMMUNIZE NO ADMIN: HCPCS | Performed by: INTERNAL MEDICINE

## 2023-10-02 PROCEDURE — 99212 OFFICE O/P EST SF 10 MIN: CPT | Performed by: INTERNAL MEDICINE

## 2023-10-02 PROCEDURE — 3078F DIAST BP <80 MM HG: CPT | Performed by: INTERNAL MEDICINE

## 2023-10-02 PROCEDURE — G8427 DOCREV CUR MEDS BY ELIG CLIN: HCPCS | Performed by: INTERNAL MEDICINE

## 2023-10-02 PROCEDURE — 3075F SYST BP GE 130 - 139MM HG: CPT | Performed by: INTERNAL MEDICINE

## 2023-10-02 PROCEDURE — 93010 ELECTROCARDIOGRAM REPORT: CPT | Performed by: INTERNAL MEDICINE

## 2023-10-02 PROCEDURE — 1123F ACP DISCUSS/DSCN MKR DOCD: CPT | Performed by: INTERNAL MEDICINE

## 2023-10-02 PROCEDURE — 1036F TOBACCO NON-USER: CPT | Performed by: INTERNAL MEDICINE

## 2023-10-02 PROCEDURE — 99213 OFFICE O/P EST LOW 20 MIN: CPT | Performed by: INTERNAL MEDICINE

## 2023-10-02 PROCEDURE — 93005 ELECTROCARDIOGRAM TRACING: CPT | Performed by: INTERNAL MEDICINE

## 2023-10-02 PROCEDURE — G8420 CALC BMI NORM PARAMETERS: HCPCS | Performed by: INTERNAL MEDICINE

## 2023-10-02 PROCEDURE — 3023F SPIROM DOC REV: CPT | Performed by: INTERNAL MEDICINE

## 2023-10-02 PROCEDURE — 3044F HG A1C LEVEL LT 7.0%: CPT | Performed by: INTERNAL MEDICINE

## 2023-10-02 PROCEDURE — 99214 OFFICE O/P EST MOD 30 MIN: CPT | Performed by: INTERNAL MEDICINE

## 2023-10-02 ASSESSMENT — ENCOUNTER SYMPTOMS
COUGH: 0
BLOOD IN STOOL: 0
SHORTNESS OF BREATH: 0
BACK PAIN: 0
PRIMARY PULMONARY SYMPTOMS: CHRONIC BRONCHITIS
NAUSEA: 0
EYE PAIN: 0
ABDOMINAL PAIN: 0
CONSTIPATION: 0
DIARRHEA: 0
VOMITING: 0

## 2023-10-02 ASSESSMENT — COPD QUESTIONNAIRES: COPD: 1

## 2023-10-02 NOTE — PROGRESS NOTES
[place] [address]  [place] [address]  Dept: [phone]    CC: follow up for CAD    HPI:  The patient is a 66y.o. year old, , male is in the office for a follow up . States feels well. No cp. Has no sob. Had urology surgery and also has spinal cord stimulator implant. Did fine with those procedures. Past Medical History:   has a past medical history of Bronchiectasis with acute lower respiratory infection ([address]), Chronic bronchitis ([address]), DJD (degenerative joint disease), Enteritis, GERD (gastroesophageal reflux disease), Hearing loss, Hyperlipidemia, Hypertension, NSTEMI (non-ST elevated myocardial infarction) ([address]), Osteoarthritis of left knee, Overactive bladder, Prostate cancer ([address]), Seasonal allergies, Spondylosis, Type 2 diabetes mellitus without complication ([address]), and Type II or unspecified type diabetes mellitus without mention of complication, not stated as uncontrolled. Past Surgical History:   has a past surgical history that includes Upper gastrointestinal endoscopy (03/15/2002); Colonoscopy (03/15/2002); Prostatectomy (02/09/2010); Total knee arthroplasty (Left, 04/23/2013); Tonsillectomy; Cardiac surgery; Coronary angioplasty with stent; other surgical history (08/25/2009); pr colon ca scrn not hi rsk ind (N/A, 02/27/2017); Total knee arthroplasty (Right, 02/06/2018); and Upper gastrointestinal endoscopy (N/A, 08/09/2023). Home Medications:  Prior to Admission medications    Medication Sig Start Date End Date Taking?  Authorizing Provider   levalbuterol [facility] HFA) 45 MCG/ACT inhaler inhale 1 puff every 6 hours if needed for shortness of breath or wheezing 9/25/23  Yes Krystyna Irvin MD   dicyclomine (BENTYL) 10 MG capsule take 1 capsule by mouth four times a day if needed 9/11/23  Yes Eleuterio Dai MD   Simethicone 125 MG CAPS

## 2023-10-02 NOTE — PROGRESS NOTES
[facility]  [address] [place] [id number]  Dept: [phone]  Dept Fax: [phone]  Loc: [phone]     Artemio Anthony is a 66 y.o. male who presents today for his medical conditions/complaintsas noted below. Artemio Anthony is c/o of   Chief Complaint   Patient presents with    Hypertension    Hyperlipidemia    Diabetes         HPI:     Hypertension  This is a chronic problem. The current episode started more than 1 year ago. The problem has been waxing and waning since onset. The problem is controlled. Pertinent negatives include no chest pain, headaches, neck pain, palpitations or shortness of breath. Hyperlipidemia  This is a chronic problem. The current episode started more than 1 year ago. The problem is controlled. Recent lipid tests were reviewed and are variable. Pertinent negatives include no chest pain or shortness of breath. Diabetes  He presents for his follow-up diabetic visit. He has type 2 diabetes mellitus. The initial diagnosis of diabetes was made 11 years ago. His disease course has been fluctuating. Pertinent negatives for hypoglycemia include no confusion, dizziness, headaches, nervousness/anxiousness or pallor. Pertinent negatives for diabetes include no chest pain, no polydipsia, no polyuria and no weakness. COPD  There is no cough or shortness of breath. Primary symptoms comments: Chronic bronchitis. This is a chronic problem. The current episode started more than 1 year ago. The problem occurs constantly. The problem has been waxing and waning. Pertinent negatives include no chest pain, fever or headaches. Congestive Heart Failure  Presents for follow-up (Chronic diastolic) visit. Pertinent negatives include no abdominal pain, chest pain, chest pressure, palpitations or shortness of breath. The symptoms have been stable. Compliance with total regimen is %.  Compliance with diet is

## 2023-10-28 DIAGNOSIS — I10 ESSENTIAL HYPERTENSION: ICD-10-CM

## 2023-10-30 RX ORDER — METOPROLOL TARTRATE 50 MG/1
TABLET, FILM COATED ORAL
Qty: 180 TABLET | Refills: 3 | Status: SHIPPED | OUTPATIENT
Start: 2023-10-30

## 2023-10-30 NOTE — TELEPHONE ENCOUNTER
[facility] [employer] called requesting a refill of the below medication which has been pended for you:     Requested Prescriptions     Pending Prescriptions Disp Refills    metoprolol tartrate (LOPRESSOR) 50 MG tablet [Pharmacy Med Name: METOPROLOL TARTRATE 50 MG TAB] 180 tablet 3     Sig: take 1 tablet by mouth twice a day       Last Appointment Date: 10/2/2023  Next Appointment Date: 12/14/2023    Allergies   Allergen Reactions    Zonisamide Anxiety     Nerve pain medication

## 2023-10-31 ENCOUNTER — TELEPHONE (OUTPATIENT)
Dept: CARDIOLOGY | Age: 78
End: 2023-10-31

## 2023-10-31 NOTE — TELEPHONE ENCOUNTER
Received request to hold Plavix 7 days prior to caudal epidural/ spinal injection per Dr. Dev Hall at [facility], scheduled 11/29.     Please fax response to [phone]

## 2023-12-26 DIAGNOSIS — R11.0 NAUSEA: ICD-10-CM

## 2023-12-26 RX ORDER — DICYCLOMINE HYDROCHLORIDE 10 MG/1
CAPSULE ORAL
Qty: 120 CAPSULE | Refills: 0 | Status: SHIPPED | OUTPATIENT
Start: 2023-12-26

## 2024-01-19 ENCOUNTER — HOSPITAL ENCOUNTER (EMERGENCY)
Age: 79
Discharge: HOME | End: 2024-01-19
Attending: EMERGENCY MEDICINE
Payer: MEDICARE

## 2024-01-19 ENCOUNTER — APPOINTMENT (OUTPATIENT)
Dept: CT IMAGING | Age: 79
End: 2024-01-19
Payer: MEDICARE

## 2024-01-19 VITALS
DIASTOLIC BLOOD PRESSURE: 65 MMHG | RESPIRATION RATE: 16 BRPM | OXYGEN SATURATION: 98 % | HEART RATE: 57 BPM | SYSTOLIC BLOOD PRESSURE: 166 MMHG | HEIGHT: 67 IN | TEMPERATURE: 97 F | BODY MASS INDEX: 19.62 KG/M2 | WEIGHT: 125 LBS

## 2024-01-19 DIAGNOSIS — R19.7 DIARRHEA OF PRESUMED INFECTIOUS ORIGIN: ICD-10-CM

## 2024-01-19 DIAGNOSIS — J18.9 PNEUMONIA OF BOTH LOWER LOBES DUE TO INFECTIOUS ORGANISM: Primary | ICD-10-CM

## 2024-01-19 LAB
ALBUMIN SERPL-MCNC: 4.2 G/DL (ref 3.5–5.2)
ALBUMIN/GLOB SERPL: 1.7 {RATIO} (ref 1–2.5)
ALP SERPL-CCNC: 194 U/L (ref 40–129)
ALT SERPL-CCNC: 11 U/L (ref 5–41)
AMYLASE SERPL-CCNC: 87 U/L (ref 28–100)
ANION GAP SERPL CALCULATED.3IONS-SCNC: 7 MMOL/L (ref 9–17)
AST SERPL-CCNC: 19 U/L
BACTERIA URNS QL MICRO: ABNORMAL
BASOPHILS # BLD: 0 K/UL (ref 0–0.2)
BASOPHILS NFR BLD: 0 % (ref 0–2)
BILIRUB SERPL-MCNC: 0.5 MG/DL (ref 0.3–1.2)
BILIRUB UR QL STRIP: NEGATIVE
BUN SERPL-MCNC: 13 MG/DL (ref 8–23)
BUN/CREAT SERPL: 14 (ref 9–20)
C DIFF GDH + TOXINS A+B STL QL IA.RAPID: NEGATIVE
CALCIUM SERPL-MCNC: 9.2 MG/DL (ref 8.6–10.4)
CHARACTER UR: ABNORMAL
CHLORIDE SERPL-SCNC: 101 MMOL/L (ref 98–107)
CLARITY UR: CLEAR
CO2 SERPL-SCNC: 32 MMOL/L (ref 20–31)
COLOR UR: YELLOW
CREAT SERPL-MCNC: 0.9 MG/DL (ref 0.7–1.2)
EOSINOPHIL # BLD: 0.08 K/UL (ref 0–0.4)
EOSINOPHILS RELATIVE PERCENT: 2 % (ref 1–8)
EPI CELLS #/AREA URNS HPF: ABNORMAL /HPF (ref 0–5)
ERYTHROCYTE [DISTWIDTH] IN BLOOD BY AUTOMATED COUNT: 12.8 % (ref 11.8–14.4)
GFR SERPL CREATININE-BSD FRML MDRD: >60 ML/MIN/1.73M2
GLUCOSE SERPL-MCNC: 100 MG/DL (ref 70–99)
GLUCOSE UR STRIP-MCNC: NEGATIVE MG/DL
HCT VFR BLD AUTO: 39.1 % (ref 40.7–50.3)
HGB BLD-MCNC: 13.3 G/DL (ref 13–17)
HGB UR QL STRIP.AUTO: NEGATIVE
IMM GRANULOCYTES # BLD AUTO: 0 K/UL (ref 0–0.3)
IMM GRANULOCYTES NFR BLD: 0 %
KETONES UR STRIP-MCNC: NEGATIVE MG/DL
LACTATE BLDV-SCNC: 1 MMOL/L (ref 0.5–2.2)
LEUKOCYTE ESTERASE UR QL STRIP: NEGATIVE
LIPASE SERPL-CCNC: 9 U/L (ref 13–60)
LYMPHOCYTES NFR BLD: 0.68 K/UL (ref 1–4.8)
LYMPHOCYTES RELATIVE PERCENT: 17 % (ref 15–43)
MCH RBC QN AUTO: 29.7 PG (ref 25.2–33.5)
MCHC RBC AUTO-ENTMCNC: 34 G/DL (ref 25.2–33.5)
MCV RBC AUTO: 87.3 FL (ref 82.6–102.9)
MONOCYTES NFR BLD: 0.24 K/UL (ref 0.1–1.2)
MONOCYTES NFR BLD: 6 % (ref 6–14)
MORPHOLOGY: ABNORMAL
MORPHOLOGY: ABNORMAL
NEUTROPHILS NFR BLD: 75 % (ref 44–74)
NEUTS SEG NFR BLD: 3 K/UL (ref 1.5–8.1)
NITRITE UR QL STRIP: NEGATIVE
NRBC BLD-RTO: 0 PER 100 WBC
PH UR STRIP: >=9 [PH] (ref 5–6)
PLATELET # BLD AUTO: 229 K/UL (ref 138–453)
PMV BLD AUTO: 8.6 FL (ref 8.1–13.5)
POTASSIUM SERPL-SCNC: 4 MMOL/L (ref 3.7–5.3)
PROT SERPL-MCNC: 6.7 G/DL (ref 6.4–8.3)
PROT UR STRIP-MCNC: ABNORMAL MG/DL
RBC # BLD AUTO: 4.48 M/UL (ref 4.21–5.77)
RBC #/AREA URNS HPF: ABNORMAL /HPF (ref 0–4)
SODIUM SERPL-SCNC: 140 MMOL/L (ref 135–144)
SP GR UR STRIP: 1.01 (ref 1.01–1.02)
SPECIMEN DESCRIPTION: NORMAL
UROBILINOGEN UR STRIP-ACNC: NORMAL EU/DL (ref 0–1)
WBC #/AREA URNS HPF: ABNORMAL /HPF (ref 0–4)
WBC OTHER # BLD: 4 K/UL (ref 3.5–11.3)

## 2024-01-19 PROCEDURE — 6370000000 HC RX 637 (ALT 250 FOR IP): Performed by: EMERGENCY MEDICINE

## 2024-01-19 PROCEDURE — 87324 CLOSTRIDIUM AG IA: CPT

## 2024-01-19 PROCEDURE — 2580000003 HC RX 258: Performed by: EMERGENCY MEDICINE

## 2024-01-19 PROCEDURE — 74176 CT ABD & PELVIS W/O CONTRAST: CPT

## 2024-01-19 PROCEDURE — 85025 COMPLETE CBC W/AUTO DIFF WBC: CPT

## 2024-01-19 PROCEDURE — 82150 ASSAY OF AMYLASE: CPT

## 2024-01-19 PROCEDURE — 80053 COMPREHEN METABOLIC PANEL: CPT

## 2024-01-19 PROCEDURE — 99284 EMERGENCY DEPT VISIT MOD MDM: CPT

## 2024-01-19 PROCEDURE — 81001 URINALYSIS AUTO W/SCOPE: CPT

## 2024-01-19 PROCEDURE — 87449 NOS EACH ORGANISM AG IA: CPT

## 2024-01-19 PROCEDURE — 87506 IADNA-DNA/RNA PROBE TQ 6-11: CPT

## 2024-01-19 PROCEDURE — 83605 ASSAY OF LACTIC ACID: CPT

## 2024-01-19 PROCEDURE — 83690 ASSAY OF LIPASE: CPT

## 2024-01-19 RX ORDER — METRONIDAZOLE 500 MG/1
500 TABLET ORAL 3 TIMES DAILY
Qty: 21 TABLET | Refills: 0 | Status: SHIPPED | OUTPATIENT
Start: 2024-01-19 | End: 2024-01-26

## 2024-01-19 RX ORDER — METRONIDAZOLE 250 MG/1
500 TABLET ORAL ONCE
Status: COMPLETED | OUTPATIENT
Start: 2024-01-19 | End: 2024-01-19

## 2024-01-19 RX ORDER — CIPROFLOXACIN 500 MG/1
500 TABLET, FILM COATED ORAL 2 TIMES DAILY
Qty: 14 TABLET | Refills: 0 | Status: SHIPPED | OUTPATIENT
Start: 2024-01-19 | End: 2024-01-26

## 2024-01-19 RX ORDER — METRONIDAZOLE 500 MG/100ML
500 INJECTION, SOLUTION INTRAVENOUS ONCE
Status: DISCONTINUED | OUTPATIENT
Start: 2024-01-19 | End: 2024-01-19

## 2024-01-19 RX ORDER — CIPROFLOXACIN 250 MG/1
500 TABLET, FILM COATED ORAL ONCE
Status: COMPLETED | OUTPATIENT
Start: 2024-01-19 | End: 2024-01-19

## 2024-01-19 RX ORDER — 0.9 % SODIUM CHLORIDE 0.9 %
500 INTRAVENOUS SOLUTION INTRAVENOUS ONCE
Status: COMPLETED | OUTPATIENT
Start: 2024-01-19 | End: 2024-01-19

## 2024-01-19 RX ADMIN — SODIUM CHLORIDE 500 ML: 9 INJECTION, SOLUTION INTRAVENOUS at 18:01

## 2024-01-19 RX ADMIN — METRONIDAZOLE 500 MG: 250 TABLET ORAL at 19:22

## 2024-01-19 RX ADMIN — CIPROFLOXACIN 500 MG: 250 TABLET, FILM COATED ORAL at 19:22

## 2024-01-19 ASSESSMENT — PAIN - FUNCTIONAL ASSESSMENT: PAIN_FUNCTIONAL_ASSESSMENT: NONE - DENIES PAIN

## 2024-01-19 NOTE — ED PROVIDER NOTES
[facility]  EMERGENCY DEPARTMENT ENCOUNTER      Pt Name: Rk Bustos  MRN: [id number]  Birthdate 1945  Date of evaluation: 1/19/2024      CHIEF COMPLAINT       Chief Complaint   Patient presents with    Diarrhea     2 wks of watery diarrhea and gas and bloating. Denies recent antibiotics         HISTORY OF PRESENT ILLNESS      The patient was emerged from with diarrhea.  He has had yellowish, watery diarrhea for about 2 weeks.  He says the last time this happened he had a perforation.  The patient has a history of ulcers and diabetes.  He denies fever.  He denies blood in his stool.  He has not been vomiting.  He reports bloating.  He has not been on any antibiotics recently.      REVIEW OF SYSTEMS       All systems reviewed and negative unless noted in HPI.  The patient denies fever or constitutional symptoms.    Denies vision change.   Denies any sore throat or rhinorrhea.    Denies any neck pain or stiffness.    Denies chest pain or shortness of breath.    Bloating and diarrhea as noted in HPI.  Denies any dysuria.  Denies urinary frequency or hematuria.    Denies musculoskeletal injury or pain.   Denies any weakness, numbness or focal neurologic deficit.    Denies any skin rash or edema.    No recent psychiatric issues.   Takes Plavix.  History of diabetes.       PAST MEDICAL HISTORY    has a past medical history of Bronchiectasis with acute lower respiratory infection (HCC), Chronic bronchitis ([facility]), DJD (degenerative joint disease), Enteritis, GERD (gastroesophageal reflux disease), Hearing loss, Hyperlipidemia, Hypertension, NSTEMI (non-ST elevated myocardial infarction) ([facility]), Osteoarthritis of left knee, Overactive bladder, Prostate cancer (HCC), Seasonal allergies, Spondylosis, Type 2 diabetes mellitus without complication (HCC), and Type II or unspecified type diabetes mellitus without mention of complication, not stated as uncontrolled.    SURGICAL HISTORY      has a past surgical

## 2024-01-20 NOTE — DISCHARGE INSTRUCTIONS
Drink plenty of fluids.  Cipro and Flagyl as directed.  Continue other medications as directed.  Return for worsening pain, fever, vomiting, blood in stool or emesis, difficulty breathing, or if worse in any way.    Please understand that at this time there is no evidence for a more serious underlying process, but that early in the process of an illness or injury, an emergency department workup can be falsely reassuring.  You should contact your family doctor within the next 48 hours for a follow up appointment    THANK YOU!!!    From [facility] and [facility]    On behalf of the Emergency Department staff at [facility], I would like to thank you for giving us the opportunity to address your health care needs and concerns.    We hope that during your visit, our service was delivered in a professional and caring manner. Please keep [facility] in mind as we walk with you down the path to your own personal wellness.     Please expect an automated text message or email from us so we can ask a few questions about your health and progress. Based on your answers, a clinician may call you back to offer help and instructions.    Please understand that early in the process of an illness or injury, an emergency department workup can be falsely reassuring.  If you notice any worsening, changing or persistent symptoms please call your family doctor or return to the ER immediately.     Tell us how we did during your visit at [email or web address]   and let us know about your experience

## 2024-01-21 LAB
CAMPYLOBACTER DNA SPEC NAA+PROBE: NORMAL
ETEC ELTA+ESTB GENES STL QL NAA+PROBE: NORMAL
P SHIGELLOIDES DNA STL QL NAA+PROBE: NORMAL
SALMONELLA DNA SPEC QL NAA+PROBE: NORMAL
SHIGA TOXIN STX GENE SPEC NAA+PROBE: NORMAL
SHIGELLA DNA SPEC QL NAA+PROBE: NORMAL
SPECIMEN DESCRIPTION: NORMAL
V CHOL+PARA RFBL+TRKH+TNAA STL QL NAA+PR: NORMAL
Y ENTERO RECN STL QL NAA+PROBE: NORMAL

## 2024-02-08 ENCOUNTER — OFFICE VISIT (OUTPATIENT)
Dept: PRIMARY CARE CLINIC | Age: 79
End: 2024-02-08
Payer: MEDICARE

## 2024-02-08 VITALS
WEIGHT: 134.38 LBS | HEIGHT: 66 IN | TEMPERATURE: 96.8 F | OXYGEN SATURATION: 94 % | SYSTOLIC BLOOD PRESSURE: 122 MMHG | HEART RATE: 69 BPM | RESPIRATION RATE: 14 BRPM | DIASTOLIC BLOOD PRESSURE: 62 MMHG | BODY MASS INDEX: 21.6 KG/M2

## 2024-02-08 DIAGNOSIS — R19.7 DIARRHEA, UNSPECIFIED TYPE: Primary | ICD-10-CM

## 2024-02-08 PROCEDURE — G8484 FLU IMMUNIZE NO ADMIN: HCPCS | Performed by: NURSE PRACTITIONER

## 2024-02-08 PROCEDURE — G8427 DOCREV CUR MEDS BY ELIG CLIN: HCPCS | Performed by: NURSE PRACTITIONER

## 2024-02-08 PROCEDURE — 1123F ACP DISCUSS/DSCN MKR DOCD: CPT | Performed by: NURSE PRACTITIONER

## 2024-02-08 PROCEDURE — 99213 OFFICE O/P EST LOW 20 MIN: CPT | Performed by: NURSE PRACTITIONER

## 2024-02-08 PROCEDURE — 3074F SYST BP LT 130 MM HG: CPT | Performed by: NURSE PRACTITIONER

## 2024-02-08 PROCEDURE — G8420 CALC BMI NORM PARAMETERS: HCPCS | Performed by: NURSE PRACTITIONER

## 2024-02-08 PROCEDURE — 3078F DIAST BP <80 MM HG: CPT | Performed by: NURSE PRACTITIONER

## 2024-02-08 PROCEDURE — 1036F TOBACCO NON-USER: CPT | Performed by: NURSE PRACTITIONER

## 2024-02-08 ASSESSMENT — ENCOUNTER SYMPTOMS
VOMITING: 0
BLOATING: 1
COUGH: 0
DIARRHEA: 1
ABDOMINAL PAIN: 0

## 2024-02-09 ENCOUNTER — HOSPITAL ENCOUNTER (OUTPATIENT)
Age: 79
Setting detail: SPECIMEN
Discharge: HOME | End: 2024-02-09
Payer: MEDICARE

## 2024-02-09 DIAGNOSIS — R19.7 DIARRHEA, UNSPECIFIED TYPE: ICD-10-CM

## 2024-02-09 PROCEDURE — 87506 IADNA-DNA/RNA PROBE TQ 6-11: CPT

## 2024-02-09 PROCEDURE — 87449 NOS EACH ORGANISM AG IA: CPT

## 2024-02-09 PROCEDURE — 87324 CLOSTRIDIUM AG IA: CPT

## 2024-02-09 NOTE — PROGRESS NOTES
smoking about 56 years ago. He has a 10.0 pack-year smoking history. He has never used smokeless tobacco.      Objective:    Vitals:    02/08/24 1856   BP: 122/62   Site: Left Upper Arm   Position: Sitting   Cuff Size: Medium Adult   Pulse: 69   Resp: 14   Temp: 96.8 °F (36 °C)   TempSrc: Tympanic   SpO2: 94%   Weight: 61 kg (134 lb 6 oz)   Height: 1.676 m (5' 6\")     Body mass index is 21.69 kg/m².    Review of Systems   Constitutional:  Negative for fever.   Respiratory:  Negative for cough.    Gastrointestinal:  Positive for bloating and diarrhea. Negative for abdominal pain and vomiting.   Musculoskeletal:  Negative for myalgias.   Neurological:  Negative for headaches.       Physical Exam  Vitals and nursing note reviewed.   Constitutional:       Appearance: Normal appearance. He is well-developed.   HENT:      Head: Normocephalic.      Jaw: There is normal jaw occlusion.      Mouth/Throat:      Lips: Pink.      Mouth: Mucous membranes are moist.      Pharynx: Oropharynx is clear. Uvula midline.   Eyes:      Pupils: Pupils are equal, round, and reactive to light.   Cardiovascular:      Rate and Rhythm: Normal rate and regular rhythm.      Heart sounds: Normal heart sounds.   Pulmonary:      Effort: Pulmonary effort is normal.      Breath sounds: Normal breath sounds and air entry.   Abdominal:      General: Abdomen is flat. Bowel sounds are increased.      Palpations: Abdomen is soft.      Tenderness: There is no abdominal tenderness.   Musculoskeletal:      Cervical back: Normal range of motion and neck supple.   Skin:     General: Skin is warm and dry.   Neurological:      General: No focal deficit present.      Mental Status: He is alert and oriented to person, place, and time.   Psychiatric:         Behavior: Behavior normal.         Thought Content: Thought content normal.       Assessment and Plan:    CT ABDOMEN PELVIS WO CONTRAST Additional Contrast? None    Result Date: 1/19/2024  EXAMINATION: CT OF THE

## 2024-02-10 LAB
C DIFF GDH + TOXINS A+B STL QL IA.RAPID: NEGATIVE
CAMPYLOBACTER DNA SPEC NAA+PROBE: NORMAL
ETEC ELTA+ESTB GENES STL QL NAA+PROBE: NORMAL
P SHIGELLOIDES DNA STL QL NAA+PROBE: NORMAL
SALMONELLA DNA SPEC QL NAA+PROBE: NORMAL
SHIGA TOXIN STX GENE SPEC NAA+PROBE: NORMAL
SHIGELLA DNA SPEC QL NAA+PROBE: NORMAL
SPECIMEN DESCRIPTION: NORMAL
SPECIMEN DESCRIPTION: NORMAL
V CHOL+PARA RFBL+TRKH+TNAA STL QL NAA+PR: NORMAL
Y ENTERO RECN STL QL NAA+PROBE: NORMAL

## 2024-02-12 RX ORDER — CLOPIDOGREL BISULFATE 75 MG/1
TABLET ORAL
Qty: 90 TABLET | Refills: 3 | Status: SHIPPED | OUTPATIENT
Start: 2024-02-12

## 2024-02-16 ENCOUNTER — OFFICE VISIT (OUTPATIENT)
Dept: INTERNAL MEDICINE | Age: 79
End: 2024-02-16
Payer: MEDICARE

## 2024-02-16 VITALS
OXYGEN SATURATION: 96 % | DIASTOLIC BLOOD PRESSURE: 64 MMHG | SYSTOLIC BLOOD PRESSURE: 126 MMHG | BODY MASS INDEX: 21.05 KG/M2 | HEART RATE: 63 BPM | WEIGHT: 131 LBS | HEIGHT: 66 IN | RESPIRATION RATE: 16 BRPM

## 2024-02-16 DIAGNOSIS — E78.49 OTHER HYPERLIPIDEMIA: ICD-10-CM

## 2024-02-16 DIAGNOSIS — S22.080D CLOSED WEDGE COMPRESSION FRACTURE OF T12 VERTEBRA WITH ROUTINE HEALING, SUBSEQUENT ENCOUNTER: ICD-10-CM

## 2024-02-16 DIAGNOSIS — R11.0 NAUSEA: ICD-10-CM

## 2024-02-16 DIAGNOSIS — E11.9 TYPE 2 DIABETES MELLITUS WITHOUT COMPLICATION, WITHOUT LONG-TERM CURRENT USE OF INSULIN (HCC): ICD-10-CM

## 2024-02-16 DIAGNOSIS — R19.7 DIARRHEA, UNSPECIFIED TYPE: ICD-10-CM

## 2024-02-16 DIAGNOSIS — I10 ESSENTIAL HYPERTENSION: Primary | ICD-10-CM

## 2024-02-16 DIAGNOSIS — K21.9 GASTROESOPHAGEAL REFLUX DISEASE WITHOUT ESOPHAGITIS: ICD-10-CM

## 2024-02-16 PROCEDURE — 1036F TOBACCO NON-USER: CPT | Performed by: INTERNAL MEDICINE

## 2024-02-16 PROCEDURE — G8484 FLU IMMUNIZE NO ADMIN: HCPCS | Performed by: INTERNAL MEDICINE

## 2024-02-16 PROCEDURE — G8420 CALC BMI NORM PARAMETERS: HCPCS | Performed by: INTERNAL MEDICINE

## 2024-02-16 PROCEDURE — 99212 OFFICE O/P EST SF 10 MIN: CPT | Performed by: INTERNAL MEDICINE

## 2024-02-16 PROCEDURE — 99214 OFFICE O/P EST MOD 30 MIN: CPT | Performed by: INTERNAL MEDICINE

## 2024-02-16 PROCEDURE — 3074F SYST BP LT 130 MM HG: CPT | Performed by: INTERNAL MEDICINE

## 2024-02-16 PROCEDURE — 1123F ACP DISCUSS/DSCN MKR DOCD: CPT | Performed by: INTERNAL MEDICINE

## 2024-02-16 PROCEDURE — G8427 DOCREV CUR MEDS BY ELIG CLIN: HCPCS | Performed by: INTERNAL MEDICINE

## 2024-02-16 PROCEDURE — 3078F DIAST BP <80 MM HG: CPT | Performed by: INTERNAL MEDICINE

## 2024-02-16 RX ORDER — FAMOTIDINE 20 MG/1
20 TABLET, FILM COATED ORAL DAILY
Qty: 90 TABLET | Refills: 3 | Status: SHIPPED | OUTPATIENT
Start: 2024-02-16

## 2024-02-16 RX ORDER — ONDANSETRON 4 MG/1
TABLET, FILM COATED ORAL
Qty: 30 TABLET | Refills: 5 | Status: SHIPPED | OUTPATIENT
Start: 2024-02-16

## 2024-02-16 NOTE — PROGRESS NOTES
PROSTATECTOMY  2010    Dr. Isaac Ramirez    TONSILLECTOMY      TOTAL KNEE ARTHROPLASTY Left 2013    TOTAL KNEE ARTHROPLASTY Right 2018    [facility] [place]    UPPER GASTROINTESTINAL ENDOSCOPY  03/15/2002    Dr. Brian    UPPER GASTROINTESTINAL ENDOSCOPY N/A 2023    EGD BIOPSY - mild chronic inactive gastritis, small hiatal hernia, negative for ulcer - performed by Kahlil Brian MD at [facility] OR       Family History   Problem Relation Age of Onset    Stroke Father           Social History     Tobacco Use    Smoking status: Former     Current packs/day: 0.00     Average packs/day: 0.5 packs/day for 20.0 years (10.0 ttl pk-yrs)     Types: Cigarettes     Start date: 1967     Quit date: 1987     Years since quittin.8    Smokeless tobacco: Never   Substance Use Topics    Alcohol use: No     Alcohol/week: 0.0 standard drinks of alcohol         Current Outpatient Medications   Medication Sig Dispense Refill    ondansetron (ZOFRAN) 4 MG tablet take 1 tablet by mouth every 8 hours if needed for nausea and vomiting 30 tablet 5    famotidine (PEPCID) 20 MG tablet Take 1 tablet by mouth daily 90 tablet 3    clopidogrel (PLAVIX) 75 MG tablet take 1 tablet by mouth once daily 90 tablet 3    dicyclomine (BENTYL) 10 MG capsule take 1 capsule by mouth four times a day if needed 120 capsule 0    metoprolol tartrate (LOPRESSOR) 50 MG tablet take 1 tablet by mouth twice a day 180 tablet 3    levalbuterol (XOPENEX HFA) 45 MCG/ACT inhaler inhale 1 puff every 6 hours if needed for shortness of breath or wheezing 15 g 5    Simethicone 125 MG CAPS Take 1 capsule by mouth daily as needed      Apoaequorin (PREVAGEN) 10 MG CAPS Take 1 capsule by mouth daily Indications: short term memory loss      lidocaine (LIDODERM) 5 % apply 1 patch TO THE AFFECTED AREA DAILY. LEAVE ON FOR 12 HOURS AND THEN OFF FOR 12 HOURS.      pantoprazole (PROTONIX) 40 MG tablet take 1 tablet by mouth once daily

## 2024-03-05 ENCOUNTER — TELEPHONE (OUTPATIENT)
Dept: CARDIOLOGY | Age: 79
End: 2024-03-05

## 2024-03-05 NOTE — TELEPHONE ENCOUNTER
Received cardiac clearance request from [facility] in [place] for patient to have a colonscopy done.     OV and request attached for review from Surgeon's office.     Please advise    Last Appt:  10/2/2023  Next Appt:   4/8/2024  Med verified in Epic

## 2024-03-19 ENCOUNTER — HOSPITAL ENCOUNTER (EMERGENCY)
Age: 79
Discharge: HOME | End: 2024-03-19
Attending: EMERGENCY MEDICINE
Payer: MEDICARE

## 2024-03-19 VITALS
RESPIRATION RATE: 17 BRPM | TEMPERATURE: 97.6 F | BODY MASS INDEX: 18.56 KG/M2 | HEART RATE: 70 BPM | SYSTOLIC BLOOD PRESSURE: 156 MMHG | WEIGHT: 115 LBS | DIASTOLIC BLOOD PRESSURE: 73 MMHG | OXYGEN SATURATION: 95 %

## 2024-03-19 DIAGNOSIS — R10.84 GENERALIZED ABDOMINAL PAIN: Primary | ICD-10-CM

## 2024-03-19 LAB
ALBUMIN SERPL-MCNC: 4 G/DL (ref 3.5–5.2)
ALBUMIN/GLOB SERPL: 1.7 {RATIO} (ref 1–2.5)
ALP SERPL-CCNC: 126 U/L (ref 40–129)
ALT SERPL-CCNC: 13 U/L (ref 5–41)
ANION GAP SERPL CALCULATED.3IONS-SCNC: 12 MMOL/L (ref 9–17)
AST SERPL-CCNC: 24 U/L
BASOPHILS # BLD: 0.04 K/UL (ref 0–0.2)
BASOPHILS NFR BLD: 1 % (ref 0–2)
BILIRUB SERPL-MCNC: 0.6 MG/DL (ref 0.3–1.2)
BUN SERPL-MCNC: 10 MG/DL (ref 8–23)
BUN/CREAT SERPL: 13 (ref 9–20)
CALCIUM SERPL-MCNC: 9.3 MG/DL (ref 8.6–10.4)
CHLORIDE SERPL-SCNC: 93 MMOL/L (ref 98–107)
CO2 SERPL-SCNC: 33 MMOL/L (ref 20–31)
CREAT SERPL-MCNC: 0.8 MG/DL (ref 0.7–1.2)
EOSINOPHIL # BLD: 0.22 K/UL (ref 0–0.44)
EOSINOPHILS RELATIVE PERCENT: 5 % (ref 1–4)
ERYTHROCYTE [DISTWIDTH] IN BLOOD BY AUTOMATED COUNT: 11.8 % (ref 11.8–14.4)
GFR SERPL CREATININE-BSD FRML MDRD: >60 ML/MIN/1.73M2
GLUCOSE SERPL-MCNC: 104 MG/DL (ref 70–99)
HCT VFR BLD AUTO: 39.4 % (ref 40.7–50.3)
HGB BLD-MCNC: 13.7 G/DL (ref 13–17)
IMM GRANULOCYTES # BLD AUTO: <0.03 K/UL (ref 0–0.3)
IMM GRANULOCYTES NFR BLD: 0 %
LIPASE SERPL-CCNC: 9 U/L (ref 13–60)
LYMPHOCYTES NFR BLD: 0.83 K/UL (ref 1.1–3.7)
LYMPHOCYTES RELATIVE PERCENT: 19 % (ref 24–43)
MCH RBC QN AUTO: 29.4 PG (ref 25.2–33.5)
MCHC RBC AUTO-ENTMCNC: 34.8 G/DL (ref 25.2–33.5)
MCV RBC AUTO: 84.5 FL (ref 82.6–102.9)
MONOCYTES NFR BLD: 0.31 K/UL (ref 0.1–1.2)
MONOCYTES NFR BLD: 7 % (ref 3–12)
NEUTROPHILS NFR BLD: 68 % (ref 36–65)
NEUTS SEG NFR BLD: 3.08 K/UL (ref 1.5–8.1)
NRBC BLD-RTO: 0 PER 100 WBC
PLATELET # BLD AUTO: 258 K/UL (ref 138–453)
PMV BLD AUTO: 8.7 FL (ref 8.1–13.5)
POTASSIUM SERPL-SCNC: 3.7 MMOL/L (ref 3.7–5.3)
PROT SERPL-MCNC: 6.3 G/DL (ref 6.4–8.3)
RBC # BLD AUTO: 4.66 M/UL (ref 4.21–5.77)
SODIUM SERPL-SCNC: 138 MMOL/L (ref 135–144)
TROPONIN I SERPL HS-MCNC: 11 NG/L (ref 0–22)
WBC OTHER # BLD: 4.5 K/UL (ref 3.5–11.3)

## 2024-03-19 PROCEDURE — 96375 TX/PRO/DX INJ NEW DRUG ADDON: CPT

## 2024-03-19 PROCEDURE — 80053 COMPREHEN METABOLIC PANEL: CPT

## 2024-03-19 PROCEDURE — 6360000002 HC RX W HCPCS: Performed by: EMERGENCY MEDICINE

## 2024-03-19 PROCEDURE — 84484 ASSAY OF TROPONIN QUANT: CPT

## 2024-03-19 PROCEDURE — 6370000000 HC RX 637 (ALT 250 FOR IP): Performed by: EMERGENCY MEDICINE

## 2024-03-19 PROCEDURE — 93005 ELECTROCARDIOGRAM TRACING: CPT | Performed by: EMERGENCY MEDICINE

## 2024-03-19 PROCEDURE — 85025 COMPLETE CBC W/AUTO DIFF WBC: CPT

## 2024-03-19 PROCEDURE — 96374 THER/PROPH/DIAG INJ IV PUSH: CPT

## 2024-03-19 PROCEDURE — 83690 ASSAY OF LIPASE: CPT

## 2024-03-19 PROCEDURE — 99284 EMERGENCY DEPT VISIT MOD MDM: CPT

## 2024-03-19 RX ORDER — ONDANSETRON 2 MG/ML
4 INJECTION INTRAMUSCULAR; INTRAVENOUS ONCE
Status: COMPLETED | OUTPATIENT
Start: 2024-03-19 | End: 2024-03-19

## 2024-03-19 RX ORDER — MAGNESIUM HYDROXIDE/ALUMINUM HYDROXICE/SIMETHICONE 120; 1200; 1200 MG/30ML; MG/30ML; MG/30ML
30 SUSPENSION ORAL ONCE
Status: COMPLETED | OUTPATIENT
Start: 2024-03-19 | End: 2024-03-19

## 2024-03-19 RX ORDER — DICYCLOMINE HYDROCHLORIDE 10 MG/ML
20 INJECTION INTRAMUSCULAR ONCE
Status: DISCONTINUED | OUTPATIENT
Start: 2024-03-19 | End: 2024-03-20 | Stop reason: HOSPADM

## 2024-03-19 RX ORDER — KETOROLAC TROMETHAMINE 30 MG/ML
15 INJECTION, SOLUTION INTRAMUSCULAR; INTRAVENOUS ONCE
Status: COMPLETED | OUTPATIENT
Start: 2024-03-19 | End: 2024-03-19

## 2024-03-19 RX ADMIN — ALUMINUM HYDROXIDE, MAGNESIUM HYDROXIDE, AND SIMETHICONE 30 ML: 200; 200; 20 SUSPENSION ORAL at 22:19

## 2024-03-19 RX ADMIN — ONDANSETRON 4 MG: 2 INJECTION INTRAMUSCULAR; INTRAVENOUS at 21:34

## 2024-03-19 RX ADMIN — KETOROLAC TROMETHAMINE 15 MG: 30 INJECTION, SOLUTION INTRAMUSCULAR; INTRAVENOUS at 21:30

## 2024-03-19 ASSESSMENT — PAIN - FUNCTIONAL ASSESSMENT: PAIN_FUNCTIONAL_ASSESSMENT: 0-10

## 2024-03-19 ASSESSMENT — PAIN SCALES - GENERAL: PAINLEVEL_OUTOF10: 10

## 2024-03-20 NOTE — ED PROVIDER NOTES
eMERGENCY dEPARTMENT eNCOUnter      Pt Name: Rk Bustos  MRN: [id number]  Birthdate 1945  Date of evaluation: 3/19/2024      CHIEF COMPLAINT       Chief Complaint   Patient presents with    Abdominal Pain         HISTORY OF PRESENT ILLNESS    Rk Bustos is a 78 y.o. male who presents with abdominal pain.  Patient states he has been having chronic abdominal pain for over 6 months now he seems to have gotten better once he was and Metamucil and then over the last 2 months he has been having increasing problems with some diarrhea although he states currently is not having diarrhea is just slightly loose and/or slightly soft stools says pain is diffuse in nature no exacerbating relieving factors other than the fact that he feels like may be related to eating no chest pain no shortness of breath        REVIEW OF SYSTEMS       Review of systems are all reviewed and negative except stated above in the HPI    PAST MEDICAL HISTORY    has a past medical history of Bronchiectasis with acute lower respiratory infection (HCC), Chronic bronchitis (HCC), DJD (degenerative joint disease), Enteritis, GERD (gastroesophageal reflux disease), Hearing loss, Hyperlipidemia, Hypertension, NSTEMI (non-ST elevated myocardial infarction) (HCC), Osteoarthritis of left knee, Overactive bladder, Prostate cancer (HCC), Seasonal allergies, Spondylosis, Type 2 diabetes mellitus without complication (HCC), and Type II or unspecified type diabetes mellitus without mention of complication, not stated as uncontrolled.    SURGICAL HISTORY      has a past surgical history that includes Upper gastrointestinal endoscopy (03/15/2002); Colonoscopy (03/15/2002); Prostatectomy (02/09/2010); Total knee arthroplasty (Left, 04/23/2013); Tonsillectomy; Cardiac surgery; Coronary angioplasty with stent; other surgical history (08/25/2009); pr colon ca scrn not hi rsk ind (N/A, 02/27/2017); Total knee arthroplasty (Right, 02/06/2018); and Upper

## 2024-03-22 LAB
EKG ATRIAL RATE: 64 BPM
EKG P AXIS: 36 DEGREES
EKG P-R INTERVAL: 152 MS
EKG Q-T INTERVAL: 450 MS
EKG QRS DURATION: 92 MS
EKG QTC CALCULATION (BAZETT): 464 MS
EKG R AXIS: 8 DEGREES
EKG T AXIS: 37 DEGREES
EKG VENTRICULAR RATE: 64 BPM

## 2024-04-01 RX ORDER — CYCLOBENZAPRINE HCL 10 MG
10 TABLET ORAL 3 TIMES DAILY PRN
Qty: 60 TABLET | Refills: 2 | Status: SHIPPED | OUTPATIENT
Start: 2024-04-01

## 2024-05-15 DIAGNOSIS — E78.49 OTHER HYPERLIPIDEMIA: ICD-10-CM

## 2024-05-15 RX ORDER — PANTOPRAZOLE SODIUM 40 MG/1
TABLET, DELAYED RELEASE ORAL
Qty: 90 TABLET | Refills: 3 | OUTPATIENT
Start: 2024-05-15

## 2024-05-16 RX ORDER — ATORVASTATIN CALCIUM 40 MG/1
TABLET, FILM COATED ORAL
Qty: 90 TABLET | Refills: 3 | Status: SHIPPED | OUTPATIENT
Start: 2024-05-16

## 2024-05-30 ENCOUNTER — TELEPHONE (OUTPATIENT)
Dept: INTERNAL MEDICINE | Age: 79
End: 2024-05-30

## 2024-05-30 ENCOUNTER — OFFICE VISIT (OUTPATIENT)
Dept: PULMONOLOGY | Age: 79
End: 2024-05-30
Payer: MEDICARE

## 2024-05-30 VITALS
DIASTOLIC BLOOD PRESSURE: 62 MMHG | WEIGHT: 130 LBS | HEIGHT: 66 IN | BODY MASS INDEX: 20.89 KG/M2 | TEMPERATURE: 97.1 F | HEART RATE: 62 BPM | RESPIRATION RATE: 12 BRPM | SYSTOLIC BLOOD PRESSURE: 138 MMHG | OXYGEN SATURATION: 100 %

## 2024-05-30 DIAGNOSIS — J47.9 BRONCHIECTASIS WITHOUT COMPLICATION (HCC): Primary | ICD-10-CM

## 2024-05-30 PROCEDURE — 99213 OFFICE O/P EST LOW 20 MIN: CPT | Performed by: INTERNAL MEDICINE

## 2024-05-30 PROCEDURE — 3078F DIAST BP <80 MM HG: CPT | Performed by: INTERNAL MEDICINE

## 2024-05-30 PROCEDURE — G8420 CALC BMI NORM PARAMETERS: HCPCS | Performed by: INTERNAL MEDICINE

## 2024-05-30 PROCEDURE — 1123F ACP DISCUSS/DSCN MKR DOCD: CPT | Performed by: INTERNAL MEDICINE

## 2024-05-30 PROCEDURE — 1036F TOBACCO NON-USER: CPT | Performed by: INTERNAL MEDICINE

## 2024-05-30 PROCEDURE — 99213 OFFICE O/P EST LOW 20 MIN: CPT

## 2024-05-30 PROCEDURE — G8428 CUR MEDS NOT DOCUMENT: HCPCS | Performed by: INTERNAL MEDICINE

## 2024-05-30 PROCEDURE — 3075F SYST BP GE 130 - 139MM HG: CPT | Performed by: INTERNAL MEDICINE

## 2024-05-30 RX ORDER — MEMANTINE HYDROCHLORIDE 5 MG/1
5 TABLET ORAL 2 TIMES DAILY
Qty: 180 TABLET | Refills: 3 | Status: SHIPPED | OUTPATIENT
Start: 2024-05-30

## 2024-05-30 NOTE — TELEPHONE ENCOUNTER
Aricept is not on preferred med list, but namenda is on the preferred list.    I left  for patient advising him that we would send in the medication, but he needed to call me back regarding the dexa scan and the recommendations    Please advise on namenda dosing

## 2024-05-30 NOTE — TELEPHONE ENCOUNTER
Patient stopped in at the window in IM department. He stated that when he was at the pharmacy he was looking at prevagen for memory lose, however the pharmacist would not sell it to him d/t interactions with plavix.   The pharmacist had recommended Aricept for the patient. He is wanting to know if he can start this medication?    He has also been seeing [facility] for many years and he is noticing a lot more pain and bone deformity in his hands and feet and he is wanting to know if he could have a dexa scan ordered?    Please advise

## 2024-05-30 NOTE — TELEPHONE ENCOUNTER
Pend Aricept 10 mg daily    Not sure why he wants a DEXA scan, which checks for bone density.    he is having some pain and arthritic changes in the hands and feet.    Offer x-rays of the hands and feet bilaterally.  We can also offer referral for rheumatology consult.  Pend orders if he agrees

## 2024-06-03 ENCOUNTER — OFFICE VISIT (OUTPATIENT)
Dept: PRIMARY CARE CLINIC | Age: 79
End: 2024-06-03
Payer: MEDICARE

## 2024-06-03 VITALS
DIASTOLIC BLOOD PRESSURE: 68 MMHG | BODY MASS INDEX: 21.53 KG/M2 | SYSTOLIC BLOOD PRESSURE: 160 MMHG | TEMPERATURE: 98.4 F | HEART RATE: 66 BPM | OXYGEN SATURATION: 98 % | HEIGHT: 66 IN | WEIGHT: 134 LBS | RESPIRATION RATE: 22 BRPM

## 2024-06-03 DIAGNOSIS — S80.11XA CONTUSION OF RIGHT LOWER EXTREMITY, INITIAL ENCOUNTER: Primary | ICD-10-CM

## 2024-06-03 PROBLEM — J42 CHRONIC BRONCHITIS (HCC): Status: RESOLVED | Noted: 2023-07-05 | Resolved: 2024-06-03

## 2024-06-03 PROBLEM — G30.9 ALZHEIMER'S DISEASE, UNSPECIFIED (CODE) (HCC): Status: ACTIVE | Noted: 2024-06-03

## 2024-06-03 PROCEDURE — G8420 CALC BMI NORM PARAMETERS: HCPCS | Performed by: STUDENT IN AN ORGANIZED HEALTH CARE EDUCATION/TRAINING PROGRAM

## 2024-06-03 PROCEDURE — 99213 OFFICE O/P EST LOW 20 MIN: CPT | Performed by: STUDENT IN AN ORGANIZED HEALTH CARE EDUCATION/TRAINING PROGRAM

## 2024-06-03 PROCEDURE — 3077F SYST BP >= 140 MM HG: CPT | Performed by: STUDENT IN AN ORGANIZED HEALTH CARE EDUCATION/TRAINING PROGRAM

## 2024-06-03 PROCEDURE — 99212 OFFICE O/P EST SF 10 MIN: CPT

## 2024-06-03 PROCEDURE — G8427 DOCREV CUR MEDS BY ELIG CLIN: HCPCS | Performed by: STUDENT IN AN ORGANIZED HEALTH CARE EDUCATION/TRAINING PROGRAM

## 2024-06-03 PROCEDURE — 1123F ACP DISCUSS/DSCN MKR DOCD: CPT | Performed by: STUDENT IN AN ORGANIZED HEALTH CARE EDUCATION/TRAINING PROGRAM

## 2024-06-03 PROCEDURE — 3078F DIAST BP <80 MM HG: CPT | Performed by: STUDENT IN AN ORGANIZED HEALTH CARE EDUCATION/TRAINING PROGRAM

## 2024-06-03 PROCEDURE — 1036F TOBACCO NON-USER: CPT | Performed by: STUDENT IN AN ORGANIZED HEALTH CARE EDUCATION/TRAINING PROGRAM

## 2024-06-03 RX ORDER — PREDNISONE 20 MG/1
40 TABLET ORAL DAILY
Qty: 10 TABLET | Refills: 0 | Status: SHIPPED | OUTPATIENT
Start: 2024-06-03 | End: 2024-06-08

## 2024-06-03 ASSESSMENT — ENCOUNTER SYMPTOMS
DIARRHEA: 0
NAUSEA: 0
COUGH: 0
VOMITING: 0
EYE PAIN: 0
ABDOMINAL PAIN: 0
BLOOD IN STOOL: 0
CONSTIPATION: 0
TROUBLE SWALLOWING: 0
SHORTNESS OF BREATH: 0

## 2024-06-03 NOTE — PROGRESS NOTES
[facility]             [address] [address]                        Telephone [phone]             Fax [phone]       Rk Bustos  :  1945  Age:  78 y.o.   MRN:  [id number]  Date of visit:  6/3/2024     Assessment and Plan:    1. Contusion of right lower extremity, initial encounter  Contusion of the right thigh and right knee with associated bruising and some swelling to the muscle area of the right thigh will treat with prednisone at this time.  Recommend return to urgent care if symptoms worsen or fail to improve.  Follow-up as needed for this issue.  - predniSONE (DELTASONE) 20 MG tablet; Take 2 tablets by mouth daily for 5 days  Dispense: 10 tablet; Refill: 0      Subjective:    Rk Bustos is a 78 y.o. male who presents to [facility] today (6/3/2024) for evaluation of:  Leg Pain (Pt reports ongoing right hip pain with arthritis, fall on Saturday when tripped, pain in right upper leg into hip since. )    Patient is a 78-year-old male presents to urgent care today for evaluation of right-sided leg pain.  States that he fell 2 days ago when he tripped landing on his right thigh.  He states that he has been having some increased pain in the area for the past couple of days.  States that previously he has had prednisone for this type of pain with significant improvement.  Chief Complaint   Patient presents with    Leg Pain     Pt reports ongoing right hip pain with arthritis, fall on Saturday when tripped, pain in right upper leg into hip since.      He has the following problem list:  Patient Active Problem List   Diagnosis    Essential hypertension    Prostate cancer (HCC)    Hyperlipidemia    CAD (coronary artery disease) SP drug eluting stent 2 stents to RCA and 1 to L circ     History of colon polyps    Chronic midline low back pain with bilateral sciatica    Pneumoperitoneum of

## 2024-06-04 NOTE — PROGRESS NOTES
pack-year smoking history. He has never used smokeless tobacco.  ETOH:   reports no history of alcohol use.      ALLERGIES:      Allergies   Allergen Reactions    Zonisamide Anxiety     Nerve pain medication          Home Meds:   Prior to Admission medications    Medication Sig Start Date End Date Taking? Authorizing Provider   atorvastatin (LIPITOR) 40 MG tablet take 1 tablet by mouth once daily 5/16/24  Yes River Hernandez MD   cyclobenzaprine (FLEXERIL) 10 MG tablet take 1 tablet by mouth three times a day if needed for muscle spasm 4/1/24  Yes River Hernandez MD   ondansetron (ZOFRAN) 4 MG tablet take 1 tablet by mouth every 8 hours if needed for nausea and vomiting 2/16/24  Yes River Hernandez MD   famotidine (PEPCID) 20 MG tablet Take 1 tablet by mouth daily 2/16/24  Yes River Hernandez MD   clopidogrel (PLAVIX) 75 MG tablet take 1 tablet by mouth once daily 2/12/24  Yes Adonis Gallo, PAYTON - NP   dicyclomine (BENTYL) 10 MG capsule take 1 capsule by mouth four times a day if needed 12/26/23  Yes Tayler Banerjee, PAYTON - CNP   metoprolol tartrate (LOPRESSOR) 50 MG tablet take 1 tablet by mouth twice a day 10/30/23  Yes River Hernandez MD   levalbuterol (XOPENEX HFA) 45 MCG/ACT inhaler inhale 1 puff every 6 hours if needed for shortness of breath or wheezing 9/25/23  Yes Keyur Arizemndi MD   Simethicone 125 MG CAPS Take 1 capsule by mouth daily as needed   Yes Jerod Littlejohn MD   Apoaequorin (PREVAGEN) 10 MG CAPS Take 1 capsule by mouth daily Indications: short term memory loss   Yes Jerod Littlejohn MD   lidocaine (LIDODERM) 5 % apply 1 patch TO THE AFFECTED AREA DAILY. LEAVE ON FOR 12 HOURS AND THEN OFF FOR 12 HOURS. 6/12/23  Yes Jerod Littlejohn MD   triamcinolone (KENALOG) 0.1 % cream apply topically twice a day 5/1/23  Yes River Hernandez MD   diclofenac sodium (VOLTAREN) 1 % GEL Apply 4 g topically 4 times daily as needed for Pain 11/22/21  Yes Makayla Julian, DO

## 2024-07-23 ENCOUNTER — HOSPITAL ENCOUNTER (OUTPATIENT)
Age: 79
Discharge: HOME | End: 2024-07-23
Payer: MEDICARE

## 2024-07-23 DIAGNOSIS — E11.9 TYPE 2 DIABETES MELLITUS WITHOUT COMPLICATION, WITHOUT LONG-TERM CURRENT USE OF INSULIN (HCC): ICD-10-CM

## 2024-07-23 DIAGNOSIS — I10 ESSENTIAL HYPERTENSION: ICD-10-CM

## 2024-07-23 LAB
ALBUMIN SERPL-MCNC: 4.2 G/DL (ref 3.5–5.2)
ALBUMIN/GLOB SERPL: 1.6 {RATIO} (ref 1–2.5)
ALP SERPL-CCNC: 148 U/L (ref 40–129)
ALT SERPL-CCNC: 9 U/L (ref 5–41)
ANION GAP SERPL CALCULATED.3IONS-SCNC: 10 MMOL/L (ref 9–17)
AST SERPL-CCNC: 16 U/L
BILIRUB SERPL-MCNC: 0.8 MG/DL (ref 0.3–1.2)
BUN SERPL-MCNC: 19 MG/DL (ref 8–23)
BUN/CREAT SERPL: 19 (ref 9–20)
CALCIUM SERPL-MCNC: 9.5 MG/DL (ref 8.6–10.4)
CHLORIDE SERPL-SCNC: 100 MMOL/L (ref 98–107)
CO2 SERPL-SCNC: 29 MMOL/L (ref 20–31)
CREAT SERPL-MCNC: 1 MG/DL (ref 0.7–1.2)
EST. AVERAGE GLUCOSE BLD GHB EST-MCNC: 114 MG/DL
GFR, ESTIMATED: 77 ML/MIN/1.73M2
GLUCOSE SERPL-MCNC: 134 MG/DL (ref 70–99)
HBA1C MFR BLD: 5.6 % (ref 4–6)
POTASSIUM SERPL-SCNC: 4.3 MMOL/L (ref 3.7–5.3)
PROT SERPL-MCNC: 6.8 G/DL (ref 6.4–8.3)
SODIUM SERPL-SCNC: 139 MMOL/L (ref 135–144)

## 2024-07-23 PROCEDURE — 83036 HEMOGLOBIN GLYCOSYLATED A1C: CPT

## 2024-07-23 PROCEDURE — 80053 COMPREHEN METABOLIC PANEL: CPT

## 2024-07-23 PROCEDURE — 36415 COLL VENOUS BLD VENIPUNCTURE: CPT

## 2024-09-11 ENCOUNTER — TELEPHONE (OUTPATIENT)
Dept: CARDIOLOGY | Age: 79
End: 2024-09-11

## 2024-09-13 NOTE — TELEPHONE ENCOUNTER
Pt is to be called for condition update post left heart catheterization on 09/20/2024.    Are you having any pain, redness or swelling at your puncture site?    Do you have a scheduled follow up visit in cardiology?     Do you have any questions or concerns at this time?        Rx pended

## 2024-09-26 RX ORDER — PANTOPRAZOLE SODIUM 40 MG/1
40 TABLET, DELAYED RELEASE ORAL DAILY
Qty: 90 TABLET | Refills: 3 | Status: SHIPPED | OUTPATIENT
Start: 2024-09-26

## 2024-11-04 ENCOUNTER — TELEPHONE (OUTPATIENT)
Dept: CARDIOLOGY | Age: 79
End: 2024-11-04

## 2024-11-04 NOTE — TELEPHONE ENCOUNTER
[facility] faxed an updated request for cardiac clearance, already done in Sept.  Now pt is over one year since seeing cardio.  Pt canceled and no showed the last 2 scheduled cardio visits this past spring.    LM for pt to call to schedule cardio follow up.  [facility] notified per fax.  See scan.

## 2024-11-08 ENCOUNTER — OFFICE VISIT (OUTPATIENT)
Dept: CARDIOLOGY | Age: 79
End: 2024-11-08
Payer: MEDICARE

## 2024-11-08 VITALS
HEART RATE: 56 BPM | HEIGHT: 66 IN | WEIGHT: 150.2 LBS | SYSTOLIC BLOOD PRESSURE: 134 MMHG | DIASTOLIC BLOOD PRESSURE: 66 MMHG | BODY MASS INDEX: 24.14 KG/M2

## 2024-11-08 DIAGNOSIS — Z87.891 FORMER SMOKER: ICD-10-CM

## 2024-11-08 DIAGNOSIS — I25.10 CORONARY ARTERY DISEASE INVOLVING NATIVE CORONARY ARTERY OF NATIVE HEART WITHOUT ANGINA PECTORIS: Primary | ICD-10-CM

## 2024-11-08 DIAGNOSIS — E78.2 MIXED HYPERLIPIDEMIA: ICD-10-CM

## 2024-11-08 DIAGNOSIS — R00.1 BRADYCARDIA: ICD-10-CM

## 2024-11-08 DIAGNOSIS — I50.32 CHRONIC DIASTOLIC CONGESTIVE HEART FAILURE (HCC): ICD-10-CM

## 2024-11-08 DIAGNOSIS — I10 HYPERTENSION, UNSPECIFIED TYPE: ICD-10-CM

## 2024-11-08 PROCEDURE — 99213 OFFICE O/P EST LOW 20 MIN: CPT | Performed by: INTERNAL MEDICINE

## 2024-11-08 PROCEDURE — 93005 ELECTROCARDIOGRAM TRACING: CPT | Performed by: INTERNAL MEDICINE

## 2024-11-08 NOTE — PROGRESS NOTES
high risk of cardiac event.    The patient is to follow up in 6 months or sooner if necessary.     DAMEON Bro, KAILASH, MARIO HANCOCK  [facility]  [facility]  [phone]    This note was created with the assistance of a speech-recognition program.  Although the intention is to generate a document that actually reflects the content of the visit, no guarantees can be provided that every mistake has been identified and corrected by editing.

## 2024-12-13 DIAGNOSIS — I10 ESSENTIAL HYPERTENSION: ICD-10-CM

## 2024-12-13 RX ORDER — METOPROLOL TARTRATE 50 MG
50 TABLET ORAL 2 TIMES DAILY
Qty: 180 TABLET | Refills: 3 | Status: SHIPPED | OUTPATIENT
Start: 2024-12-13

## 2024-12-13 NOTE — TELEPHONE ENCOUNTER
Rk called requesting a refill of the below medication which has been pended for you:     Requested Prescriptions     Pending Prescriptions Disp Refills    metoprolol tartrate (LOPRESSOR) 50 MG tablet 180 tablet 3     Sig: Take 1 tablet by mouth 2 times daily       Last Appointment Date: 2/16/2024  Next Appointment Date: Visit date not found    Allergies   Allergen Reactions    Zonisamide Anxiety     Nerve pain medication

## 2025-01-16 DIAGNOSIS — J47.9 BRONCHIECTASIS WITHOUT COMPLICATION (HCC): ICD-10-CM

## 2025-01-16 RX ORDER — LEVALBUTEROL TARTRATE 45 UG/1
2 AEROSOL, METERED ORAL EVERY 6 HOURS PRN
Qty: 15 G | Refills: 5 | Status: SHIPPED | OUTPATIENT
Start: 2025-01-16

## 2025-01-17 ENCOUNTER — TELEPHONE (OUTPATIENT)
Dept: CARDIOLOGY | Age: 80
End: 2025-01-17

## 2025-01-17 NOTE — TELEPHONE ENCOUNTER
Dr. Braswell at [facility] requesting to hold Plavix 7 days prior to Sander L4-5, L5-S1 Lumbar RFA, local anesthesia, on 2/5/25.    Fax response to [phone]

## 2025-02-12 RX ORDER — CLOPIDOGREL BISULFATE 75 MG/1
TABLET ORAL
Qty: 90 TABLET | Refills: 3 | OUTPATIENT
Start: 2025-02-12

## 2025-02-24 ENCOUNTER — TELEPHONE (OUTPATIENT)
Dept: INTERNAL MEDICINE | Age: 80
End: 2025-02-24

## 2025-02-24 DIAGNOSIS — E11.9 TYPE 2 DIABETES MELLITUS WITHOUT COMPLICATION, WITHOUT LONG-TERM CURRENT USE OF INSULIN (HCC): ICD-10-CM

## 2025-02-24 DIAGNOSIS — I10 PRIMARY HYPERTENSION: ICD-10-CM

## 2025-02-24 DIAGNOSIS — C61 PRIMARY PROSTATE ADENOCARCINOMA (HCC): ICD-10-CM

## 2025-02-24 DIAGNOSIS — E78.49 OTHER HYPERLIPIDEMIA: Primary | ICD-10-CM

## 2025-02-24 NOTE — TELEPHONE ENCOUNTER
Angel stopped by to reschedul CarmeloW he missed in Jan (forgot about appt, has spinal stenosis that affects his memory and also has a lot of fatigue). He would like lab orders put in the system so he can stop by and do labs Thur or Fri so Dr Hernandez will hve the results for his appt

## 2025-03-06 ENCOUNTER — OFFICE VISIT (OUTPATIENT)
Dept: INTERNAL MEDICINE | Age: 80
End: 2025-03-06
Payer: MEDICARE

## 2025-03-06 VITALS
RESPIRATION RATE: 16 BRPM | SYSTOLIC BLOOD PRESSURE: 126 MMHG | HEIGHT: 66 IN | BODY MASS INDEX: 25.55 KG/M2 | HEART RATE: 74 BPM | OXYGEN SATURATION: 94 % | WEIGHT: 159 LBS | DIASTOLIC BLOOD PRESSURE: 70 MMHG

## 2025-03-06 DIAGNOSIS — C61 PRIMARY PROSTATE ADENOCARCINOMA (HCC): ICD-10-CM

## 2025-03-06 DIAGNOSIS — I25.83 CORONARY ARTERY DISEASE DUE TO LIPID RICH PLAQUE: ICD-10-CM

## 2025-03-06 DIAGNOSIS — I25.10 CORONARY ARTERY DISEASE DUE TO LIPID RICH PLAQUE: ICD-10-CM

## 2025-03-06 DIAGNOSIS — R41.3 MEMORY LOSS: ICD-10-CM

## 2025-03-06 DIAGNOSIS — R53.83 OTHER FATIGUE: ICD-10-CM

## 2025-03-06 DIAGNOSIS — E78.49 OTHER HYPERLIPIDEMIA: ICD-10-CM

## 2025-03-06 DIAGNOSIS — I10 ESSENTIAL HYPERTENSION: ICD-10-CM

## 2025-03-06 DIAGNOSIS — R11.0 NAUSEA: ICD-10-CM

## 2025-03-06 DIAGNOSIS — Z00.00 GENERAL MEDICAL EXAM: Primary | ICD-10-CM

## 2025-03-06 DIAGNOSIS — K21.9 GASTROESOPHAGEAL REFLUX DISEASE WITHOUT ESOPHAGITIS: ICD-10-CM

## 2025-03-06 DIAGNOSIS — M48.062 SPINAL STENOSIS, LUMBAR REGION, WITH NEUROGENIC CLAUDICATION: ICD-10-CM

## 2025-03-06 DIAGNOSIS — E11.9 TYPE 2 DIABETES MELLITUS WITHOUT COMPLICATION, WITHOUT LONG-TERM CURRENT USE OF INSULIN (HCC): ICD-10-CM

## 2025-03-06 DIAGNOSIS — Z00.00 MEDICARE ANNUAL WELLNESS VISIT, SUBSEQUENT: ICD-10-CM

## 2025-03-06 PROCEDURE — 3074F SYST BP LT 130 MM HG: CPT | Performed by: INTERNAL MEDICINE

## 2025-03-06 PROCEDURE — 1123F ACP DISCUSS/DSCN MKR DOCD: CPT | Performed by: INTERNAL MEDICINE

## 2025-03-06 PROCEDURE — 1159F MED LIST DOCD IN RCRD: CPT | Performed by: INTERNAL MEDICINE

## 2025-03-06 PROCEDURE — 1160F RVW MEDS BY RX/DR IN RCRD: CPT | Performed by: INTERNAL MEDICINE

## 2025-03-06 PROCEDURE — 99214 OFFICE O/P EST MOD 30 MIN: CPT | Performed by: INTERNAL MEDICINE

## 2025-03-06 PROCEDURE — 3078F DIAST BP <80 MM HG: CPT | Performed by: INTERNAL MEDICINE

## 2025-03-06 PROCEDURE — G0439 PPPS, SUBSEQ VISIT: HCPCS | Performed by: INTERNAL MEDICINE

## 2025-03-06 PROCEDURE — 99212 OFFICE O/P EST SF 10 MIN: CPT | Performed by: INTERNAL MEDICINE

## 2025-03-06 RX ORDER — CLOPIDOGREL BISULFATE 75 MG/1
TABLET ORAL
Qty: 90 TABLET | Refills: 3 | Status: SHIPPED | OUTPATIENT
Start: 2025-03-06

## 2025-03-06 RX ORDER — ATORVASTATIN CALCIUM 40 MG/1
40 TABLET, FILM COATED ORAL DAILY
Qty: 90 TABLET | Refills: 3 | Status: SHIPPED | OUTPATIENT
Start: 2025-03-06

## 2025-03-06 RX ORDER — DULOXETIN HYDROCHLORIDE 30 MG/1
30 CAPSULE, DELAYED RELEASE ORAL DAILY
COMMUNITY

## 2025-03-06 RX ORDER — MEMANTINE HYDROCHLORIDE 5 MG/1
TABLET ORAL
Qty: 270 TABLET | Refills: 3 | Status: SHIPPED | OUTPATIENT
Start: 2025-03-06

## 2025-03-06 RX ORDER — ONDANSETRON 4 MG/1
TABLET, FILM COATED ORAL
Qty: 60 TABLET | Refills: 5 | Status: SHIPPED | OUTPATIENT
Start: 2025-03-06

## 2025-03-06 RX ORDER — METOPROLOL TARTRATE 50 MG
50 TABLET ORAL 2 TIMES DAILY
Qty: 180 TABLET | Refills: 3 | Status: SHIPPED | OUTPATIENT
Start: 2025-03-06

## 2025-03-06 RX ORDER — FAMOTIDINE 20 MG/1
20 TABLET, FILM COATED ORAL DAILY
Qty: 90 TABLET | Refills: 3 | Status: SHIPPED | OUTPATIENT
Start: 2025-03-06

## 2025-03-06 SDOH — ECONOMIC STABILITY: FOOD INSECURITY: WITHIN THE PAST 12 MONTHS, YOU WORRIED THAT YOUR FOOD WOULD RUN OUT BEFORE YOU GOT MONEY TO BUY MORE.: NEVER TRUE

## 2025-03-06 SDOH — ECONOMIC STABILITY: FOOD INSECURITY: WITHIN THE PAST 12 MONTHS, THE FOOD YOU BOUGHT JUST DIDN'T LAST AND YOU DIDN'T HAVE MONEY TO GET MORE.: NEVER TRUE

## 2025-03-06 ASSESSMENT — PATIENT HEALTH QUESTIONNAIRE - PHQ9
1. LITTLE INTEREST OR PLEASURE IN DOING THINGS: NOT AT ALL
SUM OF ALL RESPONSES TO PHQ QUESTIONS 1-9: 0
2. FEELING DOWN, DEPRESSED OR HOPELESS: NOT AT ALL
SUM OF ALL RESPONSES TO PHQ QUESTIONS 1-9: 0

## 2025-03-06 ASSESSMENT — ENCOUNTER SYMPTOMS
VOMITING: 0
BLOOD IN STOOL: 0
DIARRHEA: 0
NAUSEA: 0
CONSTIPATION: 0
ABDOMINAL PAIN: 0
EYE PAIN: 0
COUGH: 0
BACK PAIN: 0
SHORTNESS OF BREATH: 0

## 2025-03-06 NOTE — PATIENT INSTRUCTIONS
changes add up over time. You can start by adding one healthy food to your meals each day.    Try to make half your plate fruits and vegetables, one-fourth whole grains, and one-fourth lean proteins. Try including dairy with your meals.   Eat more fruits and vegetables. Try to have them with most meals and snacks.   Foods for healthy eating        Fruits   These can be fresh, frozen, canned, or dried.  Try to choose whole fruit rather than fruit juice.  Eat a variety of colors.        Vegetables   These can be fresh, frozen, canned, or dried.  Beans, peas, and lentils count too.        Whole grains   Choose whole-grain breads, cereals, and noodles.  Try brown rice.        Lean proteins   These can include lean meat, poultry, fish, and eggs.  You can also have tofu, beans, peas, lentils, nuts, and seeds.        Dairy   Try milk, yogurt, and cheese.  Choose low-fat or fat-free when you can.  If you need to, use lactose-free milk or fortified plant-based milk products, such as soy milk.        Water   Drink water when you're thirsty.  Limit sugar-sweetened drinks, including soda, fruit drinks, and sports drinks.  Where can you learn more?  Go to https://www.[employer]net/patientEd and enter T756 to learn more about \"Eating Healthy Foods: Care Instructions.\"  Current as of: September 20, 2023  Content Version: 14.3  © 2024 [employer].   Care instructions adapted under license by [employer]. If you have questions about a medical condition or this instruction, always ask your healthcare professional. Join [employer] Company[employer], disclaims any warranty or liability for your use of this information.         Advance Directives: Care Instructions  Overview  An advance directive is a legal way to state your wishes at the end of your life. It tells your family and your doctor what to do if you can't say what you want.  There are two main types of advance directives. You can change them any time your wishes

## 2025-03-06 NOTE — PROGRESS NOTES
I, Dr. Hernandez, directly supervised the performance of this Medicare annual wellness visit  
Encounter   Medications    memantine (NAMENDA) 5 MG tablet     Sig: Take 2 tablets in the Morning & 1 tablet Nightly     Dispense:  270 tablet     Refill:  3    atorvastatin (LIPITOR) 40 MG tablet     Sig: Take 1 tablet by mouth daily     Dispense:  90 tablet     Refill:  3    clopidogrel (PLAVIX) 75 MG tablet     Sig: take 1 tablet by mouth once daily     Dispense:  90 tablet     Refill:  3    famotidine (PEPCID) 20 MG tablet     Sig: Take 1 tablet by mouth daily     Dispense:  90 tablet     Refill:  3    metoprolol tartrate (LOPRESSOR) 50 MG tablet     Sig: Take 1 tablet by mouth 2 times daily     Dispense:  180 tablet     Refill:  3    ondansetron (ZOFRAN) 4 MG tablet     Sig: take 1 tablet by mouth every 8 hours if needed for nausea and vomitingtake 1 tablet by mouth every 8 hours if needed for nausea and vomiting     Dispense:  60 tablet     Refill:  5       Patientgiven educational materials - see patient instructions.  Discussed use, benefit,and side effects of prescribed medications.  All patient questions answered. Ptvoiced understanding. Reviewed health maintenance.  Instructed to continue currentmedications, diet and exercise.  Patient agreed with treatment plan. Follow up asdirected.     Electronically signed by River Hernandez MD on 3/6/2025 at 2:31 PM  
[phone]

## 2025-05-13 ENCOUNTER — TELEPHONE (OUTPATIENT)
Dept: INTERNAL MEDICINE | Age: 80
End: 2025-05-13

## 2025-05-28 ENCOUNTER — HOSPITAL ENCOUNTER (OUTPATIENT)
Age: 80
Discharge: HOME | End: 2025-05-28
Payer: MEDICARE

## 2025-05-28 ENCOUNTER — OFFICE VISIT (OUTPATIENT)
Dept: PULMONOLOGY | Age: 80
End: 2025-05-28
Payer: MEDICARE

## 2025-05-28 ENCOUNTER — HOSPITAL ENCOUNTER (OUTPATIENT)
Dept: NON INVASIVE DIAGNOSTICS | Age: 80
Discharge: HOME | End: 2025-05-28
Payer: MEDICARE

## 2025-05-28 VITALS
DIASTOLIC BLOOD PRESSURE: 70 MMHG | WEIGHT: 156.2 LBS | TEMPERATURE: 97.4 F | SYSTOLIC BLOOD PRESSURE: 144 MMHG | HEIGHT: 66 IN | OXYGEN SATURATION: 96 % | RESPIRATION RATE: 18 BRPM | BODY MASS INDEX: 25.1 KG/M2 | HEART RATE: 62 BPM

## 2025-05-28 DIAGNOSIS — J47.9 BRONCHIECTASIS WITHOUT COMPLICATION (HCC): ICD-10-CM

## 2025-05-28 DIAGNOSIS — I10 ESSENTIAL HYPERTENSION: ICD-10-CM

## 2025-05-28 DIAGNOSIS — R53.83 OTHER FATIGUE: ICD-10-CM

## 2025-05-28 DIAGNOSIS — E78.49 OTHER HYPERLIPIDEMIA: ICD-10-CM

## 2025-05-28 DIAGNOSIS — J47.0 BRONCHIECTASIS WITH ACUTE LOWER RESPIRATORY INFECTION (HCC): Primary | ICD-10-CM

## 2025-05-28 DIAGNOSIS — C61 PRIMARY PROSTATE ADENOCARCINOMA (HCC): ICD-10-CM

## 2025-05-28 DIAGNOSIS — E11.9 TYPE 2 DIABETES MELLITUS WITHOUT COMPLICATION, WITHOUT LONG-TERM CURRENT USE OF INSULIN (HCC): ICD-10-CM

## 2025-05-28 DIAGNOSIS — I10 PRIMARY HYPERTENSION: ICD-10-CM

## 2025-05-28 LAB
ALBUMIN SERPL-MCNC: 4.4 G/DL (ref 3.5–5.2)
ALBUMIN/GLOB SERPL: 2 {RATIO} (ref 1–2.5)
ALP SERPL-CCNC: 139 U/L (ref 40–129)
ALT SERPL-CCNC: 12 U/L (ref 10–50)
ANION GAP SERPL CALCULATED.3IONS-SCNC: 11 MMOL/L (ref 9–17)
ANION GAP SERPL CALCULATED.3IONS-SCNC: 11 MMOL/L (ref 9–17)
AST SERPL-CCNC: 21 U/L (ref 10–50)
BILIRUB SERPL-MCNC: 0.6 MG/DL (ref 0–1.2)
BUN SERPL-MCNC: 21 MG/DL (ref 8–23)
BUN SERPL-MCNC: 21 MG/DL (ref 8–23)
BUN/CREAT SERPL: 23 (ref 9–20)
CALCIUM SERPL-MCNC: 9.5 MG/DL (ref 8.6–10.4)
CHLORIDE SERPL-SCNC: 101 MMOL/L (ref 98–107)
CHLORIDE SERPL-SCNC: 101 MMOL/L (ref 98–107)
CHOLEST SERPL-MCNC: 137 MG/DL (ref 0–199)
CHOLESTEROL/HDL RATIO: 3.9
CO2 SERPL-SCNC: 27 MMOL/L (ref 20–31)
CO2 SERPL-SCNC: 27 MMOL/L (ref 20–31)
CREAT SERPL-MCNC: 0.9 MG/DL (ref 0.7–1.2)
CREAT SERPL-MCNC: 0.9 MG/DL (ref 0.7–1.2)
CREAT UR-MCNC: 27.4 MG/DL (ref 39–259)
ERYTHROCYTE [DISTWIDTH] IN BLOOD BY AUTOMATED COUNT: 12.8 % (ref 11.8–14.4)
ERYTHROCYTE [DISTWIDTH] IN BLOOD BY AUTOMATED COUNT: 12.8 % (ref 11.8–14.4)
GFR, ESTIMATED: 87 ML/MIN/1.73M2
GFR, ESTIMATED: 87 ML/MIN/1.73M2
GLUCOSE SERPL-MCNC: 125 MG/DL (ref 74–99)
GLUCOSE SERPL-MCNC: 125 MG/DL (ref 74–99)
HCT VFR BLD AUTO: 40 % (ref 40.7–50.3)
HCT VFR BLD AUTO: 40 % (ref 40.7–50.3)
HDLC SERPL-MCNC: 35 MG/DL
HGB BLD-MCNC: 13.7 G/DL (ref 13–17)
HGB BLD-MCNC: 13.7 G/DL (ref 13–17)
LDLC SERPL CALC-MCNC: 66 MG/DL (ref 0–100)
MCH RBC QN AUTO: 30.1 PG (ref 25.2–33.5)
MCH RBC QN AUTO: 30.1 PG (ref 25.2–33.5)
MCHC RBC AUTO-ENTMCNC: 34.3 G/DL (ref 25.2–33.5)
MCHC RBC AUTO-ENTMCNC: 34.3 G/DL (ref 25.2–33.5)
MCV RBC AUTO: 87.9 FL (ref 82.6–102.9)
MCV RBC AUTO: 87.9 FL (ref 82.6–102.9)
MICROALBUMIN UR-MCNC: <12 MG/L (ref 0–20)
MICROALBUMIN/CREAT UR-RTO: ABNORMAL MCG/MG CREAT (ref 0–17)
NRBC BLD-RTO: 0 PER 100 WBC
NRBC BLD-RTO: 0 PER 100 WBC
PLATELET # BLD AUTO: 229 K/UL (ref 138–453)
PLATELET # BLD AUTO: 229 K/UL (ref 138–453)
PMV BLD AUTO: 9 FL (ref 8.1–13.5)
PMV BLD AUTO: 9 FL (ref 8.1–13.5)
POTASSIUM SERPL-SCNC: 4.7 MMOL/L (ref 3.7–5.3)
POTASSIUM SERPL-SCNC: 4.7 MMOL/L (ref 3.7–5.3)
PROT SERPL-MCNC: 6.6 G/DL (ref 6.6–8.7)
PSA SERPL-MCNC: <0.03 NG/ML (ref 0–4)
RBC # BLD AUTO: 4.55 M/UL (ref 4.21–5.77)
RBC # BLD AUTO: 4.55 M/UL (ref 4.21–5.77)
SODIUM SERPL-SCNC: 139 MMOL/L (ref 136–145)
SODIUM SERPL-SCNC: 139 MMOL/L (ref 136–145)
TRIGL SERPL-MCNC: 178 MG/DL (ref 0–149)
TSH SERPL DL<=0.05 MIU/L-ACNC: 2.38 UIU/ML (ref 0.27–4.2)
VLDLC SERPL CALC-MCNC: 36 MG/DL (ref 1–30)
WBC OTHER # BLD: 4.5 K/UL (ref 3.5–11.3)
WBC OTHER # BLD: 4.5 K/UL (ref 3.5–11.3)

## 2025-05-28 PROCEDURE — 84153 ASSAY OF PSA TOTAL: CPT

## 2025-05-28 PROCEDURE — 99213 OFFICE O/P EST LOW 20 MIN: CPT | Performed by: INTERNAL MEDICINE

## 2025-05-28 PROCEDURE — 82565 ASSAY OF CREATININE: CPT

## 2025-05-28 PROCEDURE — 80051 ELECTROLYTE PANEL: CPT

## 2025-05-28 PROCEDURE — 85027 COMPLETE CBC AUTOMATED: CPT

## 2025-05-28 PROCEDURE — 80053 COMPREHEN METABOLIC PANEL: CPT

## 2025-05-28 PROCEDURE — 84520 ASSAY OF UREA NITROGEN: CPT

## 2025-05-28 PROCEDURE — 80061 LIPID PANEL: CPT

## 2025-05-28 PROCEDURE — 83036 HEMOGLOBIN GLYCOSYLATED A1C: CPT

## 2025-05-28 PROCEDURE — 3077F SYST BP >= 140 MM HG: CPT | Performed by: INTERNAL MEDICINE

## 2025-05-28 PROCEDURE — 82947 ASSAY GLUCOSE BLOOD QUANT: CPT

## 2025-05-28 PROCEDURE — 1159F MED LIST DOCD IN RCRD: CPT | Performed by: INTERNAL MEDICINE

## 2025-05-28 PROCEDURE — 99214 OFFICE O/P EST MOD 30 MIN: CPT | Performed by: INTERNAL MEDICINE

## 2025-05-28 PROCEDURE — 82043 UR ALBUMIN QUANTITATIVE: CPT

## 2025-05-28 PROCEDURE — 93005 ELECTROCARDIOGRAM TRACING: CPT | Performed by: PAIN MEDICINE

## 2025-05-28 PROCEDURE — 82570 ASSAY OF URINE CREATININE: CPT

## 2025-05-28 PROCEDURE — 84443 ASSAY THYROID STIM HORMONE: CPT

## 2025-05-28 PROCEDURE — 1123F ACP DISCUSS/DSCN MKR DOCD: CPT | Performed by: INTERNAL MEDICINE

## 2025-05-28 PROCEDURE — 3078F DIAST BP <80 MM HG: CPT | Performed by: INTERNAL MEDICINE

## 2025-05-28 PROCEDURE — 1160F RVW MEDS BY RX/DR IN RCRD: CPT | Performed by: INTERNAL MEDICINE

## 2025-05-28 PROCEDURE — 36415 COLL VENOUS BLD VENIPUNCTURE: CPT

## 2025-05-28 RX ORDER — LEVALBUTEROL TARTRATE 45 UG/1
2 AEROSOL, METERED ORAL EVERY 6 HOURS PRN
Qty: 15 G | Refills: 11 | Status: SHIPPED | OUTPATIENT
Start: 2025-05-28

## 2025-05-28 RX ORDER — AZITHROMYCIN 500 MG/1
500 TABLET, FILM COATED ORAL DAILY
Qty: 3 TABLET | Refills: 0 | Status: SHIPPED | OUTPATIENT
Start: 2025-05-28 | End: 2025-05-31

## 2025-05-28 RX ORDER — PREDNISONE 20 MG/1
20 TABLET ORAL DAILY
Qty: 5 TABLET | Refills: 0 | Status: SHIPPED | OUTPATIENT
Start: 2025-05-28 | End: 2025-06-02

## 2025-05-28 NOTE — PROGRESS NOTES
Rk Bustos  5/28/2025  Chief Complaint   Patient presents with    Bronchiectasis     Annual follow up        Rk Bustos  79 y.o. male   History of Present Illness  The patient presents for evaluation of congestion.    He reports a slight increase in congestion, which he has been experiencing for the past year. The congestion is more pronounced during the night. He does not report any presence of blood in his phlegm. The color of the phlegm varies from grayish to whitish, with occasional yellowish tinge. His current management includes the use of an inhaler every night before bedtime and oral medication to alleviate the mucus production.               Review of Systems -  General ROS: negative for - chills, fatigue, fever or weight loss  ENT ROS: negative for - headaches, oral lesions or sore throat  Cardiovascular ROS: no chest pain , orthopnea or pnd   Gastrointestinal ROS: no abdominal pain, change in bowel habits, or black or bloody stools  Skin - no rash   Neuro - no blurry vision , no loc . No focal weakness         LUNG CANCER SCREENING     CRITERIA MET    []     CT ORDERED  []      CRITERIA NOT MET   [x]      REFUSED                    []        REASON CRITERIA NOT MET     SMOKING LESS THAN 30 PY  []      AGE LESS THAN 50 or GREATER 80 YEARS  []      QUIT SMOKING 15 YEARS OR GREATER   []      RECENT CT WITH IN 11 MONTHS    []      LIFE EXPECTANCY < 5 YEARS   []      SIGNS  AND SYMPTOMS OF LUNG CANCER   []           Immunization   Immunization History   Administered Date(s) Administered    COVID-19, PFIZER PURPLE top, DILUTE for use, (age 12 y+), 30mcg/0.3mL 04/02/2021, 04/23/2021, 12/31/2021    Influenza, FLUAD, (age 65 y+), IM, Quadv, 0.5mL 11/19/2021    Influenza, FLUAD, (age 65 y+), IM, Trivalent PF, 0.5mL 09/12/2019    Influenza, FLUZONE High Dose, (age 65 y+), IM, Trivalent PF, 0.5mL 12/23/2015, 11/04/2016, 11/08/2017    Pneumococcal, PCV-13, PREVNAR 13, (age 6w+), IM, 0.5mL 12/23/2015,

## 2025-05-29 ENCOUNTER — RESULTS FOLLOW-UP (OUTPATIENT)
Dept: INTERNAL MEDICINE | Age: 80
End: 2025-05-29

## 2025-05-29 LAB
EKG ATRIAL RATE: 56 BPM
EKG P AXIS: 13 DEGREES
EKG P-R INTERVAL: 200 MS
EKG Q-T INTERVAL: 450 MS
EKG QRS DURATION: 94 MS
EKG QTC CALCULATION (BAZETT): 434 MS
EKG R AXIS: 8 DEGREES
EKG T AXIS: -7 DEGREES
EKG VENTRICULAR RATE: 56 BPM
EST. AVERAGE GLUCOSE BLD GHB EST-MCNC: 134 MG/DL
EST. AVERAGE GLUCOSE BLD GHB EST-MCNC: 134 MG/DL
HBA1C MFR BLD: 6.3 % (ref 4–6)
HBA1C MFR BLD: 6.3 % (ref 4–6)

## 2025-07-21 ENCOUNTER — HOSPITAL ENCOUNTER (OUTPATIENT)
Age: 80
Setting detail: SPECIMEN
Discharge: HOME | End: 2025-07-21
Payer: MEDICARE

## 2025-07-21 ENCOUNTER — HOSPITAL ENCOUNTER (OUTPATIENT)
Age: 80
Discharge: HOME | End: 2025-07-21
Payer: MEDICARE

## 2025-07-21 LAB
BUN SERPL-MCNC: 15 MG/DL (ref 8–23)
CREAT SERPL-MCNC: 1.1 MG/DL (ref 0.7–1.2)
EST. AVERAGE GLUCOSE BLD GHB EST-MCNC: 126 MG/DL
GFR, ESTIMATED: 68 ML/MIN/1.73M2
GLUCOSE SERPL-MCNC: 121 MG/DL (ref 74–99)
HBA1C MFR BLD: 6 % (ref 4–6)
PARTIAL THROMBOPLASTIN TIME: 41 SEC (ref 23.9–33.8)
PLATELET # BLD AUTO: 260 K/UL (ref 138–453)

## 2025-07-21 PROCEDURE — 83036 HEMOGLOBIN GLYCOSYLATED A1C: CPT

## 2025-07-21 PROCEDURE — 80051 ELECTROLYTE PANEL: CPT

## 2025-07-21 PROCEDURE — 84520 ASSAY OF UREA NITROGEN: CPT

## 2025-07-21 PROCEDURE — 85049 AUTOMATED PLATELET COUNT: CPT

## 2025-07-21 PROCEDURE — 85730 THROMBOPLASTIN TIME PARTIAL: CPT

## 2025-07-21 PROCEDURE — 82565 ASSAY OF CREATININE: CPT

## 2025-07-21 PROCEDURE — 36415 COLL VENOUS BLD VENIPUNCTURE: CPT

## 2025-07-21 PROCEDURE — 82947 ASSAY GLUCOSE BLOOD QUANT: CPT

## 2025-07-23 LAB
ANION GAP SERPL CALCULATED.3IONS-SCNC: 12 MMOL/L (ref 9–16)
CHLORIDE SERPL-SCNC: 101 MMOL/L (ref 98–107)
CO2 SERPL-SCNC: 26 MMOL/L (ref 20–31)
POTASSIUM SERPL-SCNC: 4.7 MMOL/L (ref 3.7–5.3)
SODIUM SERPL-SCNC: 139 MMOL/L (ref 136–145)

## (undated) DEVICE — DISCONTINUED USE 419147 KIT ENDOSCOPY CUSTOM PACK

## (undated) DEVICE — CONNECTOR TBNG AUX H2O JET DISP FOR OLY 160/180 SER

## (undated) DEVICE — FORCEPS BX L240CM JAW DIA2.4MM ORNG L CAP W/ NDL DISP RAD

## (undated) DEVICE — LINE SAMP O2 6.5FT W/FEMALE CONN F/ADULT CAPNOLINE PLUS

## (undated) DEVICE — BITE BLOCK W/VELCRO STRAP

## (undated) DEVICE — 60 ML SYRINGE REGULAR TIP: Brand: MONOJECT

## (undated) DEVICE — CO2 CANNULA,SSOFT,ADLT,7O2,4CO2,FEMALE: Brand: MEDLINE

## (undated) DEVICE — 4-PORT MANIFOLD: Brand: NEPTUNE 2

## (undated) DEVICE — MERCY DEFIANCE ENDO KIT: Brand: MEDLINE INDUSTRIES, INC.

## (undated) DEVICE — 1200CC GUARDIAN II: Brand: GUARDIAN